# Patient Record
Sex: FEMALE | Race: WHITE | NOT HISPANIC OR LATINO | Employment: OTHER | URBAN - METROPOLITAN AREA
[De-identification: names, ages, dates, MRNs, and addresses within clinical notes are randomized per-mention and may not be internally consistent; named-entity substitution may affect disease eponyms.]

---

## 2023-06-30 DIAGNOSIS — E11.65 TYPE 2 DIABETES MELLITUS WITH HYPERGLYCEMIA, UNSPECIFIED WHETHER LONG TERM INSULIN USE (MULTI): Primary | ICD-10-CM

## 2023-07-03 NOTE — TELEPHONE ENCOUNTER
Pt called stating Cinda, does not have her refill on file and they need a new rx for pended med    Next ov  8/1

## 2023-07-05 RX ORDER — GLIMEPIRIDE 4 MG/1
TABLET ORAL
Qty: 180 TABLET | Refills: 0 | Status: SHIPPED | OUTPATIENT
Start: 2023-07-05 | End: 2023-08-01 | Stop reason: SDUPTHER

## 2023-07-29 LAB
ALBUMIN (MG/L) IN URINE: 359.5 MG/L
ALBUMIN/CREATININE (UG/MG) IN URINE: 830.3 UG/MG CRT (ref 0–30)
ANION GAP IN SER/PLAS: 12 MMOL/L (ref 10–20)
CALCIUM (MG/DL) IN SER/PLAS: 9.1 MG/DL (ref 8.6–10.3)
CARBON DIOXIDE, TOTAL (MMOL/L) IN SER/PLAS: 27 MMOL/L (ref 21–32)
CHLORIDE (MMOL/L) IN SER/PLAS: 101 MMOL/L (ref 98–107)
CHOLESTEROL (MG/DL) IN SER/PLAS: 109 MG/DL (ref 0–199)
CHOLESTEROL IN HDL (MG/DL) IN SER/PLAS: 36.7 MG/DL
CHOLESTEROL/HDL RATIO: 3
CREATININE (MG/DL) IN SER/PLAS: 0.6 MG/DL (ref 0.5–1.05)
CREATININE (MG/DL) IN URINE: 43.3 MG/DL (ref 20–320)
ESTIMATED AVERAGE GLUCOSE FOR HBA1C: 223 MG/DL
GFR FEMALE: >90 ML/MIN/1.73M2
GLUCOSE (MG/DL) IN SER/PLAS: 190 MG/DL (ref 74–99)
HEMOGLOBIN A1C/HEMOGLOBIN TOTAL IN BLOOD: 9.4 %
LDL: 50 MG/DL (ref 0–99)
POTASSIUM (MMOL/L) IN SER/PLAS: 4.3 MMOL/L (ref 3.5–5.3)
SODIUM (MMOL/L) IN SER/PLAS: 136 MMOL/L (ref 136–145)
TRIGLYCERIDE (MG/DL) IN SER/PLAS: 112 MG/DL (ref 0–149)
UREA NITROGEN (MG/DL) IN SER/PLAS: 13 MG/DL (ref 6–23)
VLDL: 22 MG/DL (ref 0–40)

## 2023-08-01 ENCOUNTER — OFFICE VISIT (OUTPATIENT)
Dept: PRIMARY CARE | Facility: CLINIC | Age: 78
End: 2023-08-01
Payer: MEDICARE

## 2023-08-01 VITALS
HEART RATE: 67 BPM | SYSTOLIC BLOOD PRESSURE: 122 MMHG | WEIGHT: 126 LBS | OXYGEN SATURATION: 93 % | BODY MASS INDEX: 27.18 KG/M2 | HEIGHT: 57 IN | DIASTOLIC BLOOD PRESSURE: 62 MMHG | TEMPERATURE: 97.7 F

## 2023-08-01 DIAGNOSIS — Z00.00 ROUTINE GENERAL MEDICAL EXAMINATION AT HEALTH CARE FACILITY: Primary | ICD-10-CM

## 2023-08-01 DIAGNOSIS — J42 CHRONIC BRONCHITIS, UNSPECIFIED CHRONIC BRONCHITIS TYPE (MULTI): ICD-10-CM

## 2023-08-01 DIAGNOSIS — I10 ESSENTIAL HYPERTENSION: ICD-10-CM

## 2023-08-01 DIAGNOSIS — R29.6 RECURRENT FALLS: ICD-10-CM

## 2023-08-01 DIAGNOSIS — E11.65 TYPE 2 DIABETES MELLITUS WITH HYPERGLYCEMIA, UNSPECIFIED WHETHER LONG TERM INSULIN USE (MULTI): ICD-10-CM

## 2023-08-01 DIAGNOSIS — B35.1 ONYCHOMYCOSIS: ICD-10-CM

## 2023-08-01 DIAGNOSIS — E11.21 TYPE 2 DIABETES MELLITUS WITH DIABETIC NEPHROPATHY, WITHOUT LONG-TERM CURRENT USE OF INSULIN (MULTI): ICD-10-CM

## 2023-08-01 DIAGNOSIS — F33.42 RECURRENT MAJOR DEPRESSIVE DISORDER, IN FULL REMISSION (CMS-HCC): ICD-10-CM

## 2023-08-01 DIAGNOSIS — Z13.89 ENCOUNTER FOR SCREENING FOR OTHER DISORDER: ICD-10-CM

## 2023-08-01 DIAGNOSIS — E78.2 MIXED HYPERLIPIDEMIA: ICD-10-CM

## 2023-08-01 DIAGNOSIS — M81.0 AGE-RELATED OSTEOPOROSIS WITHOUT CURRENT PATHOLOGICAL FRACTURE: ICD-10-CM

## 2023-08-01 DIAGNOSIS — E11.9 TYPE 2 DIABETES MELLITUS WITHOUT COMPLICATION, WITHOUT LONG-TERM CURRENT USE OF INSULIN (MULTI): ICD-10-CM

## 2023-08-01 PROBLEM — G25.81 RESTLESS LEGS: Status: ACTIVE | Noted: 2020-09-03

## 2023-08-01 PROBLEM — K21.9 GASTROESOPHAGEAL REFLUX DISEASE: Status: ACTIVE | Noted: 2017-10-16

## 2023-08-01 PROBLEM — F32.9 MAJOR DEPRESSIVE DISORDER: Status: ACTIVE | Noted: 2023-08-01

## 2023-08-01 PROBLEM — I25.10 CORONARY ARTERIOSCLEROSIS: Status: ACTIVE | Noted: 2018-07-12

## 2023-08-01 PROBLEM — I70.0 ATHEROSCLEROSIS OF AORTA (CMS-HCC): Status: ACTIVE | Noted: 2020-09-03

## 2023-08-01 PROCEDURE — 99406 BEHAV CHNG SMOKING 3-10 MIN: CPT | Performed by: FAMILY MEDICINE

## 2023-08-01 PROCEDURE — 3074F SYST BP LT 130 MM HG: CPT | Performed by: FAMILY MEDICINE

## 2023-08-01 PROCEDURE — G0439 PPPS, SUBSEQ VISIT: HCPCS | Performed by: FAMILY MEDICINE

## 2023-08-01 PROCEDURE — 1159F MED LIST DOCD IN RCRD: CPT | Performed by: FAMILY MEDICINE

## 2023-08-01 PROCEDURE — G0444 DEPRESSION SCREEN ANNUAL: HCPCS | Performed by: FAMILY MEDICINE

## 2023-08-01 PROCEDURE — 1170F FXNL STATUS ASSESSED: CPT | Performed by: FAMILY MEDICINE

## 2023-08-01 PROCEDURE — 3078F DIAST BP <80 MM HG: CPT | Performed by: FAMILY MEDICINE

## 2023-08-01 PROCEDURE — 99397 PER PM REEVAL EST PAT 65+ YR: CPT | Performed by: FAMILY MEDICINE

## 2023-08-01 PROCEDURE — 1160F RVW MEDS BY RX/DR IN RCRD: CPT | Performed by: FAMILY MEDICINE

## 2023-08-01 PROCEDURE — 99214 OFFICE O/P EST MOD 30 MIN: CPT | Performed by: FAMILY MEDICINE

## 2023-08-01 RX ORDER — CYCLOBENZAPRINE HCL 5 MG
5 TABLET ORAL NIGHTLY PRN
COMMUNITY
Start: 2022-07-05

## 2023-08-01 RX ORDER — SIMVASTATIN 40 MG/1
40 TABLET, FILM COATED ORAL NIGHTLY
COMMUNITY
Start: 2017-04-12 | End: 2023-08-01 | Stop reason: WASHOUT

## 2023-08-01 RX ORDER — SERTRALINE HYDROCHLORIDE 100 MG/1
100 TABLET, FILM COATED ORAL DAILY
Qty: 90 TABLET | Refills: 1 | Status: SHIPPED | OUTPATIENT
Start: 2023-08-01 | End: 2024-02-01 | Stop reason: SDUPTHER

## 2023-08-01 RX ORDER — CARVEDILOL 6.25 MG/1
6.25 TABLET ORAL
Qty: 60 TABLET | Refills: 11 | Status: SHIPPED | OUTPATIENT
Start: 2023-08-01 | End: 2024-07-31

## 2023-08-01 RX ORDER — METFORMIN HYDROCHLORIDE 1000 MG/1
1000 TABLET ORAL
Qty: 180 TABLET | Refills: 1 | Status: SHIPPED | OUTPATIENT
Start: 2023-08-01 | End: 2024-02-01 | Stop reason: SDUPTHER

## 2023-08-01 RX ORDER — ISOSORBIDE MONONITRATE 60 MG/1
60 TABLET, EXTENDED RELEASE ORAL DAILY
COMMUNITY
Start: 2017-01-16 | End: 2023-08-01 | Stop reason: SDUPTHER

## 2023-08-01 RX ORDER — ALBUTEROL SULFATE 90 UG/1
2 AEROSOL, METERED RESPIRATORY (INHALATION) EVERY 4 HOURS PRN
COMMUNITY
Start: 2017-04-25

## 2023-08-01 RX ORDER — BLOOD SUGAR DIAGNOSTIC
STRIP MISCELLANEOUS
COMMUNITY
Start: 2017-06-02

## 2023-08-01 RX ORDER — GLIMEPIRIDE 4 MG/1
4 TABLET ORAL 2 TIMES DAILY
Qty: 180 TABLET | Refills: 0 | Status: SHIPPED | OUTPATIENT
Start: 2023-08-01 | End: 2023-12-28

## 2023-08-01 RX ORDER — LIRAGLUTIDE 6 MG/ML
1.8 INJECTION SUBCUTANEOUS DAILY
Qty: 0.17 G | Refills: 1 | Status: SHIPPED | OUTPATIENT
Start: 2023-08-01 | End: 2023-08-04 | Stop reason: SDUPTHER

## 2023-08-01 RX ORDER — SERTRALINE HYDROCHLORIDE 100 MG/1
100 TABLET, FILM COATED ORAL DAILY
COMMUNITY
Start: 2015-11-02 | End: 2023-08-01 | Stop reason: SDUPTHER

## 2023-08-01 RX ORDER — ISOSORBIDE MONONITRATE 60 MG/1
60 TABLET, EXTENDED RELEASE ORAL DAILY
Qty: 90 TABLET | Refills: 1 | Status: SHIPPED | OUTPATIENT
Start: 2023-08-01 | End: 2024-02-01 | Stop reason: SDUPTHER

## 2023-08-01 RX ORDER — ATORVASTATIN CALCIUM 40 MG/1
40 TABLET, FILM COATED ORAL DAILY
COMMUNITY
Start: 2018-07-12 | End: 2023-08-01 | Stop reason: SDUPTHER

## 2023-08-01 RX ORDER — LIRAGLUTIDE 6 MG/ML
1.8 INJECTION SUBCUTANEOUS DAILY
COMMUNITY
Start: 2017-09-16 | End: 2023-08-01 | Stop reason: SDUPTHER

## 2023-08-01 RX ORDER — ASPIRIN 81 MG/1
81 TABLET ORAL DAILY
COMMUNITY
Start: 2008-10-06

## 2023-08-01 RX ORDER — AMLODIPINE BESYLATE 10 MG/1
10 TABLET ORAL DAILY
COMMUNITY
Start: 2017-05-31 | End: 2023-08-01 | Stop reason: SDUPTHER

## 2023-08-01 RX ORDER — ATORVASTATIN CALCIUM 40 MG/1
40 TABLET, FILM COATED ORAL DAILY
Qty: 30 TABLET | Refills: 5 | Status: SHIPPED | OUTPATIENT
Start: 2023-08-01 | End: 2024-02-01 | Stop reason: SDUPTHER

## 2023-08-01 RX ORDER — METFORMIN HYDROCHLORIDE 1000 MG/1
1000 TABLET ORAL
COMMUNITY
Start: 2017-06-12 | End: 2023-08-01 | Stop reason: SDUPTHER

## 2023-08-01 RX ORDER — CARVEDILOL 12.5 MG/1
12.5 TABLET ORAL
COMMUNITY
Start: 2016-10-17 | End: 2023-08-01 | Stop reason: SINTOL

## 2023-08-01 RX ORDER — PIOGLITAZONEHYDROCHLORIDE 15 MG/1
15 TABLET ORAL DAILY
Qty: 30 TABLET | Refills: 11 | Status: SHIPPED | OUTPATIENT
Start: 2023-08-01 | End: 2024-07-31

## 2023-08-01 RX ORDER — CICLOPIROX OLAMINE 7.7 MG/G
CREAM TOPICAL 2 TIMES DAILY
Qty: 180 G | Refills: 1 | Status: SHIPPED | OUTPATIENT
Start: 2023-08-01 | End: 2024-02-01 | Stop reason: SDUPTHER

## 2023-08-01 RX ORDER — AMLODIPINE BESYLATE 10 MG/1
10 TABLET ORAL DAILY
Qty: 90 TABLET | Refills: 1 | Status: SHIPPED | OUTPATIENT
Start: 2023-08-01 | End: 2024-02-01 | Stop reason: SDUPTHER

## 2023-08-01 ASSESSMENT — PATIENT HEALTH QUESTIONNAIRE - PHQ9
SUM OF ALL RESPONSES TO PHQ9 QUESTIONS 1 AND 2: 0
1. LITTLE INTEREST OR PLEASURE IN DOING THINGS: NOT AT ALL
2. FEELING DOWN, DEPRESSED OR HOPELESS: NOT AT ALL

## 2023-08-01 ASSESSMENT — ACTIVITIES OF DAILY LIVING (ADL)
MANAGING_FINANCES: INDEPENDENT
GROCERY_SHOPPING: NEEDS ASSISTANCE
BATHING: NEEDS ASSISTANCE
DOING_HOUSEWORK: INDEPENDENT
TAKING_MEDICATION: INDEPENDENT
DRESSING: INDEPENDENT

## 2023-08-01 ASSESSMENT — ENCOUNTER SYMPTOMS: DIZZINESS: 1

## 2023-08-01 NOTE — PROGRESS NOTES
Assessment     ASSESSMENT/PLAN:      Problem List Items Addressed This Visit          Cardiac and Vasculature    Essential hypertension    Relevant Medications    carvedilol (Coreg) 6.25 mg tablet    isosorbide mononitrate ER (Imdur) 60 mg 24 hr tablet    amLODIPine (Norvasc) 10 mg tablet    Mixed hyperlipidemia    Relevant Medications    atorvastatin (Lipitor) 40 mg tablet       Endocrine/Metabolic    Type 2 diabetes mellitus with diabetic nephropathy, without long-term current use of insulin (CMS/[facility])    Relevant Medications    liraglutide (Victoza 2-Jed) 0.6 mg/0.1 mL (18 mg/3 mL) injection    metFORMIN (Glucophage) 1,000 mg tablet    pioglitazone (Actos) 15 mg tablet    Osteoporosis       Mental Health    Major depressive disorder    Relevant Medications    sertraline (Zoloft) 100 mg tablet       Pulmonary and Pneumonias    Chronic obstructive lung disease (CMS/[facility])     Other Visit Diagnoses       Routine general medical examination at health care facility    -  Primary    Recurrent falls        Relevant Orders    Referral to Physical Therapy    Type 2 diabetes mellitus with hyperglycemia, unspecified whether long term insulin use (CMS/[facility])        Relevant Medications    glimepiride (Amaryl) 4 mg tablet    Onychomycosis        Relevant Medications    ciclopirox (Loprox) 0.77 % cream    Encounter for screening for other disorder                Patient Instructions:  Patient Instructions   Recurrent fall: We are decreasing carvedilol to 6.25 mg twice daily due to increased dizziness, we have sent a referral to physical therapy to help with strengthening  T2DM: Your A1c is uncontrolled at 9.4, we have added a medication called pioglitazone to try to help with your blood sugar.  Encouraged to try low-carb diet          Ways to Help Prevent Falls at Home    Quick Tips   ? Ask for help if you need it. Most people want to help!   ? Get up slowly after sitting or laying down   ? Wear a medical alert device or keep  cell phone in your pocket   ? Use night lights, especially areas near a bathroom   ? Keep the items you use often within reach on a small stool or end table   ? Use an assistive device such as walker or cane, as directed by provider/physical therapy   ? Use a non-slip mat and grab bars in your bathroom. Look for home health sections for best options     Other Areas to Focus On   ? Exercise and nutrition: Regular exercise or taking a falls prevention class are great ways improve strength and balance. Don’t forget to stay hydrated and bring a snack!   ? Medicine side effects: Some medicines can make you sleepy or dizzy, which could cause a fall. Ask your healthcare provider about the side effects your medicines could cause. Be sure to let them know if you take any vitamins or supplements as well.   ? Tripping hazards: Remove items you could trip on, such as loose mats, rugs, cords, and clutter. Wear closed toe shoes with rubber soles.   ? Health and wellness: Get regular checkups with your healthcare provider, plus routine vision and hearing screenings. Talk with your healthcare provider about:   o Your medicines and the possible side effects - bring them in a bag if that is easier!   o Problems with balance or feeling dizzy   o Ways to promote bone health, such as Vitamin D and calcium supplements   o Questions or concerns about falling     *Ask your healthcare team if you have questions     ©[facility], 2022       Signed by: Ar Jimenez DO       FUTURE DIRECTION:   Review A1c    Subjective   SUBJECTIVE:     HPI : Patient is a 78 y.o. female who presents today for the following:     T2DM  Glimepiride 4 mg bid, Metformin 1000 mg, Victoza 1.8mcg   Admits to poor diet, low activity       HTN/CAD  -Carvedilol 12.5 mg, Imdur 60 mg, amlodipine 10 mg, baby aspirin  - has been feeling dizzy     HLD  Atorvastatin 40 mg    Depression/anxiety  Zoloft 100 mg    COPD  Ventolin    Dysphagia  Doing better, would  like to cancel barium sallow study     Nicotine use  Tobacco use, not ready to quit     Osteoporosis  -Takes vitamin D and calcium supplement    Social: goes to [place]       Review of Systems   Neurological:  Positive for dizziness.       History reviewed. No pertinent past medical history.     History reviewed. No pertinent surgical history.     Current Outpatient Medications   Medication Instructions    Accu-Chek Meghan Plus test strp strip Accu-Chek Meghan Plus In Vitro Strip   Quantity: 200  Refills: 0        Start : 2-Jun-2017   Active    amLODIPine (NORVASC) 10 mg, oral, Daily    aspirin 81 mg, oral, Daily    atorvastatin (LIPITOR) 40 mg, oral, Daily    carvedilol (COREG) 6.25 mg, oral, 2 times daily with meals    ciclopirox (Loprox) 0.77 % cream Topical, 2 times daily    cyclobenzaprine (FLEXERIL) 5 mg, oral, Nightly PRN    glimepiride (AMARYL) 4 mg, oral, 2 times daily    isosorbide mononitrate ER (IMDUR) 60 mg, oral, Daily    metFORMIN (GLUCOPHAGE) 1,000 mg, oral, 2 times daily with meals    pioglitazone (ACTOS) 15 mg, oral, Daily    sertraline (ZOLOFT) 100 mg, oral, Daily    Ventolin HFA 90 mcg/actuation inhaler 2 puffs, inhalation, Every 4 hours PRN    Victoza 2-Jed 1.8 mg, subcutaneous, Daily        Allergies   Allergen Reactions    Omeprazole Hives     hives    Ace Inhibitors Hives    Bupropion Hcl Hives     hives    Codeine Other    Penicillins Other    Sulfamethoxazole-Trimethoprim Hives        Social History     Socioeconomic History    Marital status:      Spouse name: Not on file    Number of children: Not on file    Years of education: Not on file    Highest education level: Not on file   Occupational History    Not on file   Tobacco Use    Smoking status: Every Day     Packs/day: 0.50     Types: Cigarettes     Start date: 1964    Smokeless tobacco: Never   Substance and Sexual Activity    Alcohol use: Not on file    Drug use: Not on file    Sexual activity: Not on file   Other Topics  "Concern    Not on file   Social History Narrative    Not on file     Social Determinants of Health     Financial Resource Strain: Not on file   Food Insecurity: Not on file   Transportation Needs: Not on file   Physical Activity: Not on file   Stress: Not on file   Social Connections: Not on file   Intimate Partner Violence: Not on file   Housing Stability: Not on file        No family history on file.     Objective     OBJECTIVE:     Vitals:    08/01/23 1158   BP: 122/62   Pulse: 67   Temp: 36.5 °C (97.7 °F)   TempSrc: Skin   SpO2: 93%   Weight: 57.2 kg (126 lb)   Height: 1.441 m (4' 8.75\")        Physical Exam  HENT:      Head: Normocephalic and atraumatic.      Nose: Nose normal.      Mouth/Throat:      Mouth: Mucous membranes are moist.   Eyes:      Pupils: Pupils are equal, round, and reactive to light.   Cardiovascular:      Rate and Rhythm: Normal rate and regular rhythm.      Pulses: Normal pulses.      Heart sounds: No murmur heard.  Pulmonary:      Effort: Pulmonary effort is normal.      Breath sounds: Normal breath sounds.   Abdominal:      Tenderness: There is no abdominal tenderness.   Musculoskeletal:         General: Normal range of motion.      Cervical back: Normal range of motion.   Feet:      Right foot:      Protective Sensation: 10 sites tested.  10 sites sensed.      Left foot:      Protective Sensation: 10 sites tested.  10 sites sensed.   Skin:     General: Skin is warm and dry.   Neurological:      Mental Status: She is alert.   Psychiatric:         Mood and Affect: Mood normal.             "

## 2023-08-01 NOTE — PATIENT INSTRUCTIONS
Recurrent fall: We are decreasing carvedilol to 6.25 mg twice daily due to increased dizziness, we have sent a referral to physical therapy to help with strengthening  T2DM: Your A1c is uncontrolled at 9.4, we have added a medication called pioglitazone to try to help with your blood sugar.  Encouraged to try low-carb diet  Tobacco use: Patient advised to quit however she is not ready          Ways to Help Prevent Falls at Home    Quick Tips   ? Ask for help if you need it. Most people want to help!   ? Get up slowly after sitting or laying down   ? Wear a medical alert device or keep cell phone in your pocket   ? Use night lights, especially areas near a bathroom   ? Keep the items you use often within reach on a small stool or end table   ? Use an assistive device such as walker or cane, as directed by provider/physical therapy   ? Use a non-slip mat and grab bars in your bathroom. Look for home health sections for best options     Other Areas to Focus On   ? Exercise and nutrition: Regular exercise or taking a falls prevention class are great ways improve strength and balance. Don’t forget to stay hydrated and bring a snack!   ? Medicine side effects: Some medicines can make you sleepy or dizzy, which could cause a fall. Ask your healthcare provider about the side effects your medicines could cause. Be sure to let them know if you take any vitamins or supplements as well.   ? Tripping hazards: Remove items you could trip on, such as loose mats, rugs, cords, and clutter. Wear closed toe shoes with rubber soles.   ? Health and wellness: Get regular checkups with your healthcare provider, plus routine vision and hearing screenings. Talk with your healthcare provider about:   o Your medicines and the possible side effects - bring them in a bag if that is easier!   o Problems with balance or feeling dizzy   o Ways to promote bone health, such as Vitamin D and calcium supplements   o Questions or concerns about falling      *Ask your healthcare team if you have questions     ©[facility], 2022

## 2023-08-04 DIAGNOSIS — E11.9 TYPE 2 DIABETES MELLITUS WITHOUT COMPLICATION, WITHOUT LONG-TERM CURRENT USE OF INSULIN (MULTI): ICD-10-CM

## 2023-08-04 RX ORDER — LIRAGLUTIDE 6 MG/ML
1.8 INJECTION SUBCUTANEOUS DAILY
Qty: 0.17 G | Refills: 1 | Status: SHIPPED | OUTPATIENT
Start: 2023-08-04 | End: 2024-02-01 | Stop reason: SDUPTHER

## 2023-08-04 NOTE — TELEPHONE ENCOUNTER
Received a fax stating the QTY is missing or unclear for the Victoza. Asking for QTY to be fixed and resent to [facility]. Thanks, CG

## 2023-10-06 PROBLEM — R29.6 RECURRENT FALLS: Status: ACTIVE | Noted: 2023-10-06

## 2023-10-06 PROBLEM — M43.17 ACQUIRED SPONDYLOLISTHESIS OF LUMBOSACRAL REGION: Status: ACTIVE | Noted: 2023-10-06

## 2023-10-06 PROBLEM — Z87.2: Status: ACTIVE | Noted: 2023-10-06

## 2023-10-06 RX ORDER — SIMVASTATIN 40 MG/1
40 TABLET, FILM COATED ORAL
COMMUNITY

## 2023-10-06 RX ORDER — OMEPRAZOLE 40 MG/1
40 CAPSULE, DELAYED RELEASE ORAL
COMMUNITY

## 2023-10-06 RX ORDER — PEN NEEDLE, DIABETIC 30 GX 1/3"
NEEDLE, DISPOSABLE MISCELLANEOUS
COMMUNITY

## 2023-10-12 ENCOUNTER — TREATMENT (OUTPATIENT)
Dept: PHYSICAL THERAPY | Facility: HOSPITAL | Age: 78
End: 2023-10-12
Payer: MEDICARE

## 2023-10-12 DIAGNOSIS — M43.17 ACQUIRED SPONDYLOLISTHESIS OF LUMBOSACRAL REGION: ICD-10-CM

## 2023-10-12 DIAGNOSIS — R29.6 RECURRENT FALLS: Primary | ICD-10-CM

## 2023-10-12 DIAGNOSIS — M54.16 LUMBAR RADICULOPATHY: ICD-10-CM

## 2023-10-12 PROCEDURE — 97110 THERAPEUTIC EXERCISES: CPT | Mod: GP

## 2023-10-12 ASSESSMENT — ENCOUNTER SYMPTOMS
DEPRESSION: 1
LOSS OF SENSATION IN FEET: 0
OCCASIONAL FEELINGS OF UNSTEADINESS: 1

## 2023-10-12 ASSESSMENT — BALANCE ASSESSMENTS
STANDING UNSUPPORTED WITH EYES CLOSED: ABLE TO STAND 10 SECONDS SAFELY
STANDING ON ONE LEG: TRIES TO LIFT LEG UNABLE TO HOLD 3 SECONDS BUT REMAINS STANDING INDEPENDENTLY
TURN 360 DEGREES: ABLE TO TURN 360 DEGREES SAFELY BUT SLOWLY
REACHING FORWARD WITH OUTSTRETCHED ARM WHILE STANDING: CAN REACH FORWARD 5 CM (2 INCHES)
STANDING UNSUPPORTED WITH FEET TOGETHER: ABLE TO PLACE FEET TOGETHER INDEPENDENTLY AND STAND 1 MINUTE WITH SUPERVISION
STANDING UNSUPPORTED ONE FOOT IN FRONT: ABLE TO PLACE FOOT AHEAD INDEPENDENTLY AND HOLD 30 SECONDS
STANDING UNSUPPORTED: ABLE TO STAND SAFELY FOR 2 MINUTES
STANDING TO SITTING: ABLE TO STAND INDEPENDENTLY USING HANDS
PICK UP OBJECT FROM THE FLOOR FROM A STANDING POSITION: ABLE TO PICK UP SLIPPER BUT NEEDS SUPERVISION
PLACE ALTERNATE FOOT ON STEP OR STOOL WHILE STANDING UNSUPPORTED: TURNS SIDEWAYS ONLY BUT MAINTAINS BALANCE
SITTING WITH BACK UNSUPPORTED BUT FEET SUPPORTED ON FLOOR OR ON A STOOL: ABLE TO SIT SAFELY AND SECURELY FOR 2 MINUTES
LONG VERSION TOTAL SCORE (MAX 56): 39
TRANSFERS: ABLE TO TRANSFER SAFELY DEFINITE NEED OF HANDS
PLACE ALTERNATE FOOT ON STEP OR STOOL WHILE STANDING UNSUPPORTED: ABLE TO COMPLETE 4 STEPS WITHOUT AID WITH SUPERVISION
STANDING TO SITTING: CONTROLS DESCENT BY USING HANDS

## 2023-10-12 ASSESSMENT — PAIN - FUNCTIONAL ASSESSMENT: PAIN_FUNCTIONAL_ASSESSMENT: 0-10

## 2023-10-12 ASSESSMENT — PAIN SCALES - GENERAL: PAINLEVEL_OUTOF10: 5 - MODERATE PAIN

## 2023-10-12 NOTE — PROGRESS NOTES
Physical Therapy    Physical Therapy Treatment    Patient Name: Funmilayo Montenegro  MRN: [id number]  Today's Date: 10/12/2023     Visit # 5/10  Insurance: [facility] 23 - 10/30/23    Assessment:     Pt has responded to treatment with Improvement in Oswestry Disability score from 76 to 56% and improvement in Ponce balance score from 39 to 36 despite decreased visit frequency.    Plan:     Discharge to [facility], Pt will be in/out of town due to family illness, insurance auth will  in 2 weeks.     Current Problem  1. Recurrent falls        2. Acquired spondylolisthesis of lumbosacral region        3. Lumbar radiculopathy            Subjective   General     Pt reports no new complaints, thinks that therapy has helped some but pain waxes and wanes.  Pt's brother recently diagnosed with Stage 4 CA, will being going out of state to spend time with him.     Precautions  Precautions  STEADI Fall Risk Score (The score of 4 or more indicates an increased risk of falling): 10       Pain  Pain Assessment: 0-10  Pain Score: 5 - Moderate pain    Objective      Pain in LB through out right LE with MMT'ing   Right Left   Hip:     Flexion 3/5 4-/5   Abduction 4-/5 4+/5   Adduction 4+/5 4+/5        Knee:     Flexion 4-/5 4-/5   Extension          Ankle:     PF     DF        Outcome Measures:  Ponce Balance Scale  1. Sitting to Standing: Able to stand independently using hands  2. Standing Unsupported: Able to stand safely for 2 minutes  3. Sitting with Back Unsupported but Feet Supported on Floor or on a Stool: Able to sit safely and securely for 2 minutes  4. Standing to Sitting: Controls descent by using hands  5.  Transfers: Able to transfer safely definite need of hands  6. Standing Unsupported with Eyes Closed: Able to stand 10 seconds safely  7. Standing Unsupported with Feet Together: Able to place feet together independently and stand 1 minute with supervision  8. Reach Forward with Outstretched Arm While Standing: Can reach forward  5 cm (2 inches)  9.  Object from Floor from a Standing Position: Able to  slipper but needs supervision  10. Turning to Look Behind Over Left and Right Shoulders While Standing: Turns sideways only but maintains balance  11. Turn 360 Degrees: Able to turn 360 degrees safely but slowly  12. Place Alternate Foot on Step or Stool While Standing Unsupported: Able to complete 4 steps without aid with supervision  13. Standing Unsupported One Foot in Front: Able to place foot ahead independently and hold 30 seconds  14. Standing on One Leg: Tries to lift leg unable to hold 3 seconds but remains standing independently  Ponce Balance Score: 39    Other Measures  Oswestry Disablity Index (JAMAL): 56%    Treatments:    EXERCISES Date 10/12/2023   Date  Date Date   VISIT# #5/10 # # #    REPS REPS REPS REPS          Nu Step L1 x 10'                                                                                                                                                                                                          HEP  X         OP EDUCATION:     Access Code: [email or web address]  URL: [email or web address]  Date: 10/12/2023  Prepared by: Anjali Corral    Exercises  - Tandem Stance with Support  - 1 x daily - 7 x weekly - 1 sets - 5 reps  - Side Stepping with Counter Support  - 1 x daily - 7 x weekly - 2 sets - 5 reps  Goals:        Activity Limitation: Pt will tolerate 30 minutes of aquatic exercise in preparation to transition to land based treatment, by week 3 - Discontinued  Pain: Pt will report having a decrease in LBP to 6/10 at it's worst for increased closed chain activity tolerance, by week 3 - Partially MET  Strength: Pt will have and increase in lumbopelvic strength by 1/2 MMT grade to improve core stabilization and pain, by week 6 - Partially MET  Pt will demonstrate independence and compliance with HEP to reinforce gains made in treatment. -Partially MET  , by week 2   .

## 2023-11-18 ENCOUNTER — HOSPITAL ENCOUNTER (OUTPATIENT)
Dept: RADIOLOGY | Facility: EXTERNAL LOCATION | Age: 78
Discharge: HOME | End: 2023-11-18

## 2023-11-18 DIAGNOSIS — R05.9 COUGH, UNSPECIFIED TYPE: ICD-10-CM

## 2023-11-24 DIAGNOSIS — E11.65 TYPE 2 DIABETES MELLITUS WITH HYPERGLYCEMIA, UNSPECIFIED WHETHER LONG TERM INSULIN USE (MULTI): ICD-10-CM

## 2023-12-19 RX ORDER — GLIMEPIRIDE 4 MG/1
4 TABLET ORAL 2 TIMES DAILY
Qty: 180 TABLET | Refills: 3 | OUTPATIENT
Start: 2023-12-19

## 2023-12-22 ENCOUNTER — OFFICE VISIT (OUTPATIENT)
Dept: PRIMARY CARE | Facility: CLINIC | Age: 78
End: 2023-12-22
Payer: MEDICARE

## 2023-12-22 VITALS
OXYGEN SATURATION: 94 % | SYSTOLIC BLOOD PRESSURE: 126 MMHG | TEMPERATURE: 97.1 F | HEART RATE: 96 BPM | DIASTOLIC BLOOD PRESSURE: 74 MMHG

## 2023-12-22 DIAGNOSIS — H10.33 ACUTE BACTERIAL CONJUNCTIVITIS OF BOTH EYES: Primary | ICD-10-CM

## 2023-12-22 DIAGNOSIS — R05.1 ACUTE COUGH: ICD-10-CM

## 2023-12-22 PROCEDURE — 99213 OFFICE O/P EST LOW 20 MIN: CPT | Performed by: FAMILY MEDICINE

## 2023-12-22 PROCEDURE — 99406 BEHAV CHNG SMOKING 3-10 MIN: CPT | Performed by: FAMILY MEDICINE

## 2023-12-22 PROCEDURE — 3074F SYST BP LT 130 MM HG: CPT | Performed by: FAMILY MEDICINE

## 2023-12-22 PROCEDURE — 3078F DIAST BP <80 MM HG: CPT | Performed by: FAMILY MEDICINE

## 2023-12-22 PROCEDURE — 1160F RVW MEDS BY RX/DR IN RCRD: CPT | Performed by: FAMILY MEDICINE

## 2023-12-22 PROCEDURE — 1159F MED LIST DOCD IN RCRD: CPT | Performed by: FAMILY MEDICINE

## 2023-12-22 PROCEDURE — 1125F AMNT PAIN NOTED PAIN PRSNT: CPT | Performed by: FAMILY MEDICINE

## 2023-12-22 RX ORDER — BACITRACIN ZINC AND POLYMYXIN B SULFATE 500; 10000 [USP'U]/G; [USP'U]/G
OINTMENT OPHTHALMIC EVERY 12 HOURS
Qty: 3.5 G | Refills: 0 | Status: SHIPPED | OUTPATIENT
Start: 2023-12-22 | End: 2024-01-01

## 2023-12-22 RX ORDER — AZITHROMYCIN 250 MG/1
TABLET, FILM COATED ORAL
Qty: 6 TABLET | Refills: 0 | Status: SHIPPED | OUTPATIENT
Start: 2023-12-22 | End: 2023-12-27

## 2023-12-22 NOTE — PROGRESS NOTES
Assessment/Plan   ASSESSMENT/PLAN:      Funmilayo was seen today for cough, nasal congestion, eye redness and poor appetite.  Diagnoses and all orders for this visit:  Acute bacterial conjunctivitis of both eyes (Primary)  -     bacitracin-polymyxin B (Polysporin) ophthalmic ointment; Apply to both eyes every 12 hours for 10 days.  Acute cough  -     azithromycin (Zithromax) 250 mg tablet; Take 2 tablets (500 mg) by mouth once daily for 1 day, THEN 1 tablet (250 mg) once daily for 4 days. Take 2 tabs (500 mg) by mouth today, than 1 daily for 4 days..       There are no Patient Instructions on file for this visit.    Ar Jimenez DO     FUTURE DIRECTION:     Subjective   SUBJECTIVE:     Patient ID: Funmilayo Montenegro is a 78 y.o. femalefor the following:    Cough   Ongoing for the past 2 months  Was seen in urgent care was prescribed medication however patient does not remember what it was  Cough is productive, described as purulent  Initially had fevers and chills but that has resolved  Denies chest pain or difficulty with breathing    Eye redness  -Ongoing for the past week  -States that in bilateral eyes there is drainage, redness, pain  -Denies changes in vision    Review of Systems    Allergies   Allergen Reactions    Omeprazole Hives     hives    Ace Inhibitors Hives    Bupropion Hcl Hives     hives    Codeine Other    Penicillins Other    Sulfamethoxazole-Trimethoprim Hives         Current Outpatient Medications:     Accu-Chek Meghan Plus test strp strip, Accu-Chek Meghan Plus In Vitro Strip  Quantity: 200  Refills: 0      Start : 2-Jun-2017  Active, Disp: , Rfl:     amLODIPine (Norvasc) 10 mg tablet, Take 1 tablet (10 mg) by mouth once daily., Disp: 90 tablet, Rfl: 1    aspirin 81 mg EC tablet, Take 1 tablet (81 mg) by mouth once daily., Disp: , Rfl:     atorvastatin (Lipitor) 40 mg tablet, Take 1 tablet (40 mg) by mouth once daily., Disp: 30 tablet, Rfl: 5    carvedilol (Coreg) 6.25 mg tablet, Take 1  "tablet (6.25 mg) by mouth 2 times a day with meals., Disp: 60 tablet, Rfl: 11    ciclopirox (Loprox) 0.77 % cream, Apply topically 2 times a day., Disp: 180 g, Rfl: 1    cyclobenzaprine (Flexeril) 5 mg tablet, Take 1 tablet (5 mg) by mouth as needed at bedtime., Disp: , Rfl:     IPRATROPIUM BROMIDE NASL, Administer into affected nostril(s)., Disp: , Rfl:     isosorbide mononitrate ER (Imdur) 60 mg 24 hr tablet, Take 1 tablet (60 mg) by mouth once daily., Disp: 90 tablet, Rfl: 1    liraglutide (Victoza 2-Jed) 0.6 mg/0.1 mL (18 mg/3 mL) injection, Inject 1.8 mg under the skin once daily., Disp: 0.17 g, Rfl: 1    metFORMIN (Glucophage) 1,000 mg tablet, Take 1 tablet (1,000 mg) by mouth 2 times a day with meals., Disp: 180 tablet, Rfl: 1    omeprazole (PriLOSEC) 40 mg DR capsule, Take 1 capsule (40 mg) by mouth. Do not crush or chew., Disp: , Rfl:     pen needle, diabetic (NovoTwist) 32 gauge x 1/5\" needle, , Disp: , Rfl:     pioglitazone (Actos) 15 mg tablet, Take 1 tablet (15 mg) by mouth once daily., Disp: 30 tablet, Rfl: 11    sertraline (Zoloft) 100 mg tablet, Take 1 tablet (100 mg) by mouth once daily., Disp: 90 tablet, Rfl: 1    simvastatin (Zocor) 40 mg tablet, Take 1 tablet (40 mg) by mouth., Disp: , Rfl:     SITagliptin phosphate (Januvia) 100 mg tablet, Take 1 tablet (100 mg) by mouth once daily., Disp: , Rfl:     Ventolin HFA 90 mcg/actuation inhaler, Inhale 2 puffs every 4 hours if needed., Disp: , Rfl:     azithromycin (Zithromax) 250 mg tablet, Take 2 tablets (500 mg) by mouth once daily for 1 day, THEN 1 tablet (250 mg) once daily for 4 days. Take 2 tabs (500 mg) by mouth today, than 1 daily for 4 days.., Disp: 6 tablet, Rfl: 0    bacitracin-polymyxin B (Polysporin) ophthalmic ointment, Apply to both eyes every 12 hours for 10 days., Disp: 3.5 g, Rfl: 0    glimepiride (Amaryl) 4 mg tablet, Take 1 tablet (4 mg) by mouth 2 times a day., Disp: 180 tablet, Rfl: 0     Patient Active Problem List   Diagnosis "    Atherosclerosis of aorta (CMS/HCC)    Chronic obstructive lung disease (CMS/HCC)    Coronary arteriosclerosis    Essential hypertension    Gastroesophageal reflux disease    Major depressive disorder    Mixed hyperlipidemia    Type 2 diabetes mellitus with diabetic nephropathy, without long-term current use of insulin (CMS/HCC)    Restless legs    Osteoporosis    Acquired spondylolisthesis of lumbosacral region    H/O idiopathic urticaria    Recurrent falls    Lumbar radiculopathy       Social History     Socioeconomic History    Marital status:      Spouse name: Not on file    Number of children: Not on file    Years of education: Not on file    Highest education level: Not on file   Occupational History    Not on file   Tobacco Use    Smoking status: Every Day     Packs/day: .5     Types: Cigarettes     Start date: 1964    Smokeless tobacco: Never   Substance and Sexual Activity    Alcohol use: Not on file    Drug use: Not on file    Sexual activity: Not on file   Other Topics Concern    Not on file   Social History Narrative    Not on file     Social Determinants of Health     Financial Resource Strain: Not on file   Food Insecurity: Not on file   Transportation Needs: Not on file   Physical Activity: Not on file   Stress: Not on file   Social Connections: Not on file   Intimate Partner Violence: Not on file   Housing Stability: Not on file       Objective   OBJECTIVE:     Vitals:    12/22/23 0953   BP: 126/74   Pulse: 96   Temp: 36.2 °C (97.1 °F)   SpO2: 94%       Exam      Physical Exam  Constitutional:       Appearance: Normal appearance.   HENT:      Head: Normocephalic.   Eyes:      Conjunctiva/sclera:      Right eye: Right conjunctiva is injected.      Left eye: Left conjunctiva is injected.      Comments: Erythema noted within bilateral conjunctiva with redness and crusting around bilateral lateral upper and lower lids   Pulmonary:      Effort: Pulmonary effort is normal.      Breath sounds:  Normal breath sounds. No decreased air movement.      Comments: Coughing on exam  Musculoskeletal:      Cervical back: Normal range of motion.   Neurological:      Mental Status: She is alert.   Psychiatric:         Mood and Affect: Mood normal.

## 2023-12-28 DIAGNOSIS — E11.65 TYPE 2 DIABETES MELLITUS WITH HYPERGLYCEMIA, UNSPECIFIED WHETHER LONG TERM INSULIN USE (MULTI): ICD-10-CM

## 2023-12-28 RX ORDER — GLIMEPIRIDE 4 MG/1
4 TABLET ORAL 2 TIMES DAILY
Qty: 180 TABLET | Refills: 0 | Status: SHIPPED | OUTPATIENT
Start: 2023-12-28 | End: 2024-03-14

## 2024-01-08 DIAGNOSIS — E78.2 MIXED HYPERLIPIDEMIA: ICD-10-CM

## 2024-01-09 RX ORDER — ATORVASTATIN CALCIUM 40 MG/1
40 TABLET, FILM COATED ORAL DAILY
Qty: 90 TABLET | Refills: 3 | OUTPATIENT
Start: 2024-01-09

## 2024-02-01 ENCOUNTER — OFFICE VISIT (OUTPATIENT)
Dept: PRIMARY CARE | Facility: CLINIC | Age: 79
End: 2024-02-01
Payer: MEDICARE

## 2024-02-01 VITALS
DIASTOLIC BLOOD PRESSURE: 62 MMHG | TEMPERATURE: 97 F | WEIGHT: 124 LBS | BODY MASS INDEX: 27.07 KG/M2 | SYSTOLIC BLOOD PRESSURE: 106 MMHG | OXYGEN SATURATION: 94 % | HEART RATE: 74 BPM

## 2024-02-01 DIAGNOSIS — F17.219 CIGARETTE NICOTINE DEPENDENCE WITH NICOTINE-INDUCED DISORDER: ICD-10-CM

## 2024-02-01 DIAGNOSIS — E11.9 TYPE 2 DIABETES MELLITUS WITHOUT COMPLICATION, WITHOUT LONG-TERM CURRENT USE OF INSULIN (MULTI): ICD-10-CM

## 2024-02-01 DIAGNOSIS — F33.42 RECURRENT MAJOR DEPRESSIVE DISORDER, IN FULL REMISSION (CMS-HCC): ICD-10-CM

## 2024-02-01 DIAGNOSIS — E78.2 MIXED HYPERLIPIDEMIA: ICD-10-CM

## 2024-02-01 DIAGNOSIS — R29.6 RECURRENT FALLS: ICD-10-CM

## 2024-02-01 DIAGNOSIS — I10 ESSENTIAL HYPERTENSION: Primary | ICD-10-CM

## 2024-02-01 PROCEDURE — 1160F RVW MEDS BY RX/DR IN RCRD: CPT | Performed by: FAMILY MEDICINE

## 2024-02-01 PROCEDURE — 1125F AMNT PAIN NOTED PAIN PRSNT: CPT | Performed by: FAMILY MEDICINE

## 2024-02-01 PROCEDURE — 3078F DIAST BP <80 MM HG: CPT | Performed by: FAMILY MEDICINE

## 2024-02-01 PROCEDURE — 99214 OFFICE O/P EST MOD 30 MIN: CPT | Performed by: FAMILY MEDICINE

## 2024-02-01 PROCEDURE — 3074F SYST BP LT 130 MM HG: CPT | Performed by: FAMILY MEDICINE

## 2024-02-01 PROCEDURE — 1159F MED LIST DOCD IN RCRD: CPT | Performed by: FAMILY MEDICINE

## 2024-02-01 RX ORDER — AMLODIPINE BESYLATE 10 MG/1
10 TABLET ORAL DAILY
Qty: 90 TABLET | Refills: 1 | Status: SHIPPED | OUTPATIENT
Start: 2024-02-01 | End: 2024-07-30

## 2024-02-01 RX ORDER — CICLOPIROX OLAMINE 7.7 MG/G
CREAM TOPICAL 2 TIMES DAILY
Qty: 180 G | Refills: 1 | Status: SHIPPED | OUTPATIENT
Start: 2024-02-01 | End: 2024-02-01 | Stop reason: ENTERED-IN-ERROR

## 2024-02-01 RX ORDER — METFORMIN HYDROCHLORIDE 1000 MG/1
1000 TABLET ORAL
Qty: 180 TABLET | Refills: 1 | Status: SHIPPED | OUTPATIENT
Start: 2024-02-01 | End: 2024-07-30

## 2024-02-01 RX ORDER — ATORVASTATIN CALCIUM 40 MG/1
40 TABLET, FILM COATED ORAL DAILY
Qty: 90 TABLET | Refills: 1 | Status: SHIPPED | OUTPATIENT
Start: 2024-02-01 | End: 2024-07-30

## 2024-02-01 RX ORDER — SERTRALINE HYDROCHLORIDE 100 MG/1
100 TABLET, FILM COATED ORAL DAILY
Qty: 90 TABLET | Refills: 1 | Status: SHIPPED | OUTPATIENT
Start: 2024-02-01 | End: 2024-07-30

## 2024-02-01 RX ORDER — LIRAGLUTIDE 6 MG/ML
1.8 INJECTION SUBCUTANEOUS DAILY
Qty: 27 ML | Refills: 1 | Status: SHIPPED | OUTPATIENT
Start: 2024-02-01 | End: 2024-05-24 | Stop reason: SDUPTHER

## 2024-02-01 RX ORDER — ISOSORBIDE MONONITRATE 60 MG/1
60 TABLET, EXTENDED RELEASE ORAL DAILY
Qty: 90 TABLET | Refills: 1 | Status: SHIPPED | OUTPATIENT
Start: 2024-02-01 | End: 2024-07-30

## 2024-02-01 ASSESSMENT — ENCOUNTER SYMPTOMS
OCCASIONAL FEELINGS OF UNSTEADINESS: 1
DEPRESSION: 0
LOSS OF SENSATION IN FEET: 0

## 2024-02-01 NOTE — PATIENT INSTRUCTIONS
1. Essential hypertension  Controlled  - amLODIPine (Norvasc) 10 mg tablet; Take 1 tablet (10 mg) by mouth once daily.  Dispense: 90 tablet; Refill: 1  - isosorbide mononitrate ER (Imdur) 60 mg 24 hr tablet; Take 1 tablet (60 mg) by mouth once daily.  Dispense: 90 tablet; Refill: 1    2. Mixed hyperlipidemia  Needs repeat labs  - atorvastatin (Lipitor) 40 mg tablet; Take 1 tablet (40 mg) by mouth once daily.  Dispense: 90 tablet; Refill: 1  - Lipid Panel; Future  - Lipid Panel; Future    3. Type 2 diabetes mellitus without complication, without long-term current use of insulin (CMS/HCC)  Uncontrolled due to poor diet, plan to make adjustment based on repeat labs  - liraglutide (Victoza 2-Jed) 0.6 mg/0.1 mL (18 mg/3 mL) injection; Inject 0.3 mL (1.8 mg) under the skin once daily.  Dispense: 27 mL; Refill: 1  - metFORMIN (Glucophage) 1,000 mg tablet; Take 1 tablet (1,000 mg) by mouth 2 times a day with meals.  Dispense: 180 tablet; Refill: 1  - Basic Metabolic Panel; Future  - Hemoglobin A1C; Future  - Hemoglobin A1C; Future  - Basic Metabolic Panel; Future  - Albumin, urine, random; Future    4. Cigarette nicotine dependence with nicotine-induced disorder  Tobacco cessation: spent 3-10 min discussing tobacco cessation, pt is not ready to quit     5. Recurrent major depressive disorder, in full remission (CMS/HCC)  Controlled  - sertraline (Zoloft) 100 mg tablet; Take 1 tablet (100 mg) by mouth once daily.  Dispense: 90 tablet; Refill: 1    6. Recurrent falls  Refer to fall clinic to help with recurrent falls.  - Referral to Falls Clinic; Future

## 2024-02-01 NOTE — PROGRESS NOTES
Assessment     ASSESSMENT/PLAN:      Patient Instructions   1. Essential hypertension  Controlled  - amLODIPine (Norvasc) 10 mg tablet; Take 1 tablet (10 mg) by mouth once daily.  Dispense: 90 tablet; Refill: 1  - isosorbide mononitrate ER (Imdur) 60 mg 24 hr tablet; Take 1 tablet (60 mg) by mouth once daily.  Dispense: 90 tablet; Refill: 1    2. Mixed hyperlipidemia  Needs repeat labs  - atorvastatin (Lipitor) 40 mg tablet; Take 1 tablet (40 mg) by mouth once daily.  Dispense: 90 tablet; Refill: 1  - Lipid Panel; Future  - Lipid Panel; Future    3. Type 2 diabetes mellitus without complication, without long-term current use of insulin (CMS/HCC)  Uncontrolled due to poor diet, plan to make adjustment based on repeat labs  - liraglutide (Victoza 2-Jed) 0.6 mg/0.1 mL (18 mg/3 mL) injection; Inject 0.3 mL (1.8 mg) under the skin once daily.  Dispense: 27 mL; Refill: 1  - metFORMIN (Glucophage) 1,000 mg tablet; Take 1 tablet (1,000 mg) by mouth 2 times a day with meals.  Dispense: 180 tablet; Refill: 1  - Basic Metabolic Panel; Future  - Hemoglobin A1C; Future  - Hemoglobin A1C; Future  - Basic Metabolic Panel; Future  - Albumin, urine, random; Future    4. Cigarette nicotine dependence with nicotine-induced disorder  Tobacco cessation: spent 3-10 min discussing tobacco cessation, pt is not ready to quit     5. Recurrent major depressive disorder, in full remission (CMS/HCC)  Controlled  - sertraline (Zoloft) 100 mg tablet; Take 1 tablet (100 mg) by mouth once daily.  Dispense: 90 tablet; Refill: 1    6. Recurrent falls  Refer to fall clinic to help with recurrent falls.  - Referral to Falls Clinic; Future           Signed by: Ar Jimenez DO       FUTURE DIRECTION:   []    Subjective   SUBJECTIVE:     HPI : Patient is a 78 y.o. female who presents today for the following:     T2DM  Glimepiride 4 mg bid, Metformin 1000 mg, Victoza 1.8mcg   Admits to poor diet  Uncontrolled        HTN/CAD  Carvedilol 12.5 mg,  "Imdur 60 mg, amlodipine 10 mg, baby aspirin       HLD  Atorvastatin 40 mg     Depression/anxiety  Zoloft 100 mg     COPD  Ventolin     Dysphagia  Doing better     Nicotine use  Tobacco use, not ready to quit      Osteoporosis  Takes vitamin D and calcium supplement     Social: goes to [place]           Review of Systems    History reviewed. No pertinent past medical history.     History reviewed. No pertinent surgical history.     Current Outpatient Medications   Medication Instructions    Accu-Chek Meghan Plus test strp strip Accu-Chek Meghan Plus In Vitro Strip   Quantity: 200  Refills: 0        Start : 2-Jun-2017   Active    amLODIPine (NORVASC) 10 mg, oral, Daily    aspirin 81 mg, oral, Daily    atorvastatin (LIPITOR) 40 mg, oral, Daily    carvedilol (COREG) 6.25 mg, oral, 2 times daily with meals    cyclobenzaprine (FLEXERIL) 5 mg, oral, Nightly PRN    glimepiride (AMARYL) 4 mg, oral, 2 times daily    IPRATROPIUM BROMIDE NASL nasal    isosorbide mononitrate ER (IMDUR) 60 mg, oral, Daily    metFORMIN (GLUCOPHAGE) 1,000 mg, oral, 2 times daily with meals    omeprazole (PRILOSEC) 40 mg, oral, Do not crush or chew.    pen needle, diabetic (NovoTwist) 32 gauge x 1/5\" needle miscellaneous    pioglitazone (ACTOS) 15 mg, oral, Daily    sertraline (ZOLOFT) 100 mg, oral, Daily    simvastatin (ZOCOR) 40 mg, oral    SITagliptin phosphate (JANUVIA) 100 mg, oral, Daily    Ventolin HFA 90 mcg/actuation inhaler 2 puffs, inhalation, Every 4 hours PRN    Victoza 2-Jed 1.8 mg, subcutaneous, Daily        Allergies   Allergen Reactions    Omeprazole Hives     hives    Ace Inhibitors Hives    Bupropion Hcl Hives     hives    Codeine Other    Penicillins Other    Sulfamethoxazole-Trimethoprim Hives        Social History     Socioeconomic History    Marital status:      Spouse name: Not on file    Number of children: Not on file    Years of education: Not on file    Highest education level: Not on file   Occupational History    " Not on file   Tobacco Use    Smoking status: Every Day     Packs/day: .5     Types: Cigarettes     Start date: 1964    Smokeless tobacco: Never   Substance and Sexual Activity    Alcohol use: Not on file    Drug use: Not on file    Sexual activity: Not on file   Other Topics Concern    Not on file   Social History Narrative    Not on file     Social Determinants of Health     Financial Resource Strain: Not on file   Food Insecurity: Not on file   Transportation Needs: Not on file   Physical Activity: Not on file   Stress: Not on file   Social Connections: Not on file   Intimate Partner Violence: Not on file   Housing Stability: Not on file        No family history on file.     Objective     OBJECTIVE:     Vitals:    02/01/24 1151   BP: 106/62   Pulse: 74   Temp: 36.1 °C (97 °F)   SpO2: 94%   Weight: 56.2 kg (124 lb)        Physical Exam  HENT:      Head: Normocephalic and atraumatic.      Nose: Nose normal.      Mouth/Throat:      Mouth: Mucous membranes are moist.   Eyes:      Pupils: Pupils are equal, round, and reactive to light.   Cardiovascular:      Rate and Rhythm: Normal rate and regular rhythm.      Pulses: Normal pulses.      Heart sounds: No murmur heard.  Pulmonary:      Effort: Pulmonary effort is normal.      Breath sounds: Normal breath sounds.   Abdominal:      Tenderness: There is no abdominal tenderness.   Musculoskeletal:         General: Normal range of motion.      Cervical back: Normal range of motion.   Skin:     General: Skin is warm and dry.   Neurological:      Mental Status: She is alert.   Psychiatric:         Mood and Affect: Mood normal.

## 2024-02-24 ENCOUNTER — LAB (OUTPATIENT)
Dept: LAB | Facility: LAB | Age: 79
End: 2024-02-24
Payer: MEDICARE

## 2024-02-24 DIAGNOSIS — E11.9 TYPE 2 DIABETES MELLITUS WITHOUT COMPLICATION, WITHOUT LONG-TERM CURRENT USE OF INSULIN (MULTI): ICD-10-CM

## 2024-02-24 DIAGNOSIS — E78.2 MIXED HYPERLIPIDEMIA: ICD-10-CM

## 2024-02-24 LAB
ANION GAP SERPL CALC-SCNC: 11 MMOL/L (ref 10–20)
BUN SERPL-MCNC: 20 MG/DL (ref 6–23)
CALCIUM SERPL-MCNC: 9.2 MG/DL (ref 8.6–10.3)
CHLORIDE SERPL-SCNC: 102 MMOL/L (ref 98–107)
CHOLEST SERPL-MCNC: 107 MG/DL (ref 0–199)
CHOLESTEROL/HDL RATIO: 2.7
CO2 SERPL-SCNC: 28 MMOL/L (ref 21–32)
CREAT SERPL-MCNC: 0.64 MG/DL (ref 0.5–1.05)
EGFRCR SERPLBLD CKD-EPI 2021: >90 ML/MIN/1.73M*2
EST. AVERAGE GLUCOSE BLD GHB EST-MCNC: 212 MG/DL
GLUCOSE SERPL-MCNC: 201 MG/DL (ref 74–99)
HBA1C MFR BLD: 9 %
HDLC SERPL-MCNC: 39.1 MG/DL
LDLC SERPL CALC-MCNC: 50 MG/DL
NON HDL CHOLESTEROL: 68 MG/DL (ref 0–149)
POTASSIUM SERPL-SCNC: 4.2 MMOL/L (ref 3.5–5.3)
SODIUM SERPL-SCNC: 137 MMOL/L (ref 136–145)
TRIGL SERPL-MCNC: 92 MG/DL (ref 0–149)
VLDL: 18 MG/DL (ref 0–40)

## 2024-02-24 PROCEDURE — 80048 BASIC METABOLIC PNL TOTAL CA: CPT

## 2024-02-24 PROCEDURE — 80061 LIPID PANEL: CPT

## 2024-02-24 PROCEDURE — 83036 HEMOGLOBIN GLYCOSYLATED A1C: CPT

## 2024-02-24 PROCEDURE — 36415 COLL VENOUS BLD VENIPUNCTURE: CPT

## 2024-02-27 ENCOUNTER — TELEPHONE (OUTPATIENT)
Dept: PRIMARY CARE | Facility: CLINIC | Age: 79
End: 2024-02-27
Payer: MEDICARE

## 2024-02-27 DIAGNOSIS — E11.9 TYPE 2 DIABETES MELLITUS WITHOUT COMPLICATION, WITHOUT LONG-TERM CURRENT USE OF INSULIN (MULTI): Primary | ICD-10-CM

## 2024-03-13 DIAGNOSIS — E11.65 TYPE 2 DIABETES MELLITUS WITH HYPERGLYCEMIA, UNSPECIFIED WHETHER LONG TERM INSULIN USE (MULTI): ICD-10-CM

## 2024-03-14 RX ORDER — GLIMEPIRIDE 4 MG/1
4 TABLET ORAL 2 TIMES DAILY
Qty: 180 TABLET | Refills: 1 | Status: SHIPPED | OUTPATIENT
Start: 2024-03-14

## 2024-03-18 DIAGNOSIS — E11.9 TYPE 2 DIABETES MELLITUS WITHOUT COMPLICATION, WITHOUT LONG-TERM CURRENT USE OF INSULIN (MULTI): Primary | ICD-10-CM

## 2024-03-18 NOTE — TELEPHONE ENCOUNTER
Pt called, she has 2 shots of Victoza left. Per pt no pharmacy has this in stock. Pt wanting to know what she can do? Switch to something else? Please advise. Thanks. JW

## 2024-03-20 ENCOUNTER — TELEPHONE (OUTPATIENT)
Dept: PULMONOLOGY | Facility: HOSPITAL | Age: 79
End: 2024-03-20

## 2024-03-20 ENCOUNTER — OFFICE VISIT (OUTPATIENT)
Dept: PULMONOLOGY | Facility: HOSPITAL | Age: 79
End: 2024-03-20
Payer: MEDICARE

## 2024-03-20 VITALS
WEIGHT: 122 LBS | OXYGEN SATURATION: 91 % | BODY MASS INDEX: 26.32 KG/M2 | DIASTOLIC BLOOD PRESSURE: 68 MMHG | HEIGHT: 57 IN | SYSTOLIC BLOOD PRESSURE: 151 MMHG | RESPIRATION RATE: 18 BRPM | HEART RATE: 95 BPM

## 2024-03-20 DIAGNOSIS — J44.9 CHRONIC OBSTRUCTIVE PULMONARY DISEASE, UNSPECIFIED COPD TYPE (MULTI): Primary | ICD-10-CM

## 2024-03-20 DIAGNOSIS — J30.9 ALLERGIC RHINITIS, UNSPECIFIED SEASONALITY, UNSPECIFIED TRIGGER: ICD-10-CM

## 2024-03-20 DIAGNOSIS — R05.9 COUGH, UNSPECIFIED TYPE: ICD-10-CM

## 2024-03-20 PROCEDURE — 4004F PT TOBACCO SCREEN RCVD TLK: CPT | Performed by: NURSE PRACTITIONER

## 2024-03-20 PROCEDURE — 3077F SYST BP >= 140 MM HG: CPT | Performed by: NURSE PRACTITIONER

## 2024-03-20 PROCEDURE — 3078F DIAST BP <80 MM HG: CPT | Performed by: NURSE PRACTITIONER

## 2024-03-20 PROCEDURE — 1159F MED LIST DOCD IN RCRD: CPT | Performed by: NURSE PRACTITIONER

## 2024-03-20 PROCEDURE — 1160F RVW MEDS BY RX/DR IN RCRD: CPT | Performed by: NURSE PRACTITIONER

## 2024-03-20 PROCEDURE — 99203 OFFICE O/P NEW LOW 30 MIN: CPT | Performed by: NURSE PRACTITIONER

## 2024-03-20 PROCEDURE — 99213 OFFICE O/P EST LOW 20 MIN: CPT | Mod: ZK | Performed by: NURSE PRACTITIONER

## 2024-03-20 RX ORDER — TIOTROPIUM BROMIDE AND OLODATEROL 3.124; 2.736 UG/1; UG/1
2 SPRAY, METERED RESPIRATORY (INHALATION) DAILY
Qty: 4 G | Refills: 11 | Status: SHIPPED | OUTPATIENT
Start: 2024-03-20

## 2024-03-20 RX ORDER — LORATADINE 10 MG/1
10 TABLET ORAL DAILY
Qty: 30 TABLET | Refills: 11 | Status: SHIPPED | OUTPATIENT
Start: 2024-03-20

## 2024-03-20 RX ORDER — UMECLIDINIUM BROMIDE AND VILANTEROL TRIFENATATE 62.5; 25 UG/1; UG/1
1 POWDER RESPIRATORY (INHALATION) DAILY
Qty: 1 EACH | Refills: 11 | Status: SHIPPED | OUTPATIENT
Start: 2024-03-20 | End: 2024-03-20 | Stop reason: WASHOUT

## 2024-03-20 ASSESSMENT — ENCOUNTER SYMPTOMS
FATIGUE: 0
FEVER: 0
CHILLS: 0
WHEEZING: 1
COUGH: 1
RHINORRHEA: 1
UNEXPECTED WEIGHT CHANGE: 0
SHORTNESS OF BREATH: 1

## 2024-03-20 NOTE — PROGRESS NOTES
Subjective   Patient ID: Funmilayo Montenegro is a 78 y.o. female who presents for NPV for COPD.     HPI: Patient has PMH of COPD, HTN, HLD, DMII, and depression. She was referred for COPD management. She reports shortness of breath at rest and with exertion. She has a daily productive cough that she has had as long as she remembers. She has albuterol that she uses a few times per week. She does report sinus drainage and will hear herself wheezing frequently. She has never been on maintenance therapy. Son states that she was coughing/choking after eating but patient states this is no longer happening. She is a current smoker at 3/4 ppd and mostly smoked 2 ppd since 1964. She has worked on a farm and in a factory.     Review of Systems   Constitutional:  Negative for chills, fatigue, fever and unexpected weight change.   HENT:  Positive for postnasal drip and rhinorrhea. Negative for congestion.    Respiratory:  Positive for cough (denies hemoptysis.), shortness of breath and wheezing.    Cardiovascular:  Negative for chest pain and leg swelling.   All other systems reviewed and are negative.      Objective   Physical Exam  Vitals reviewed.   Constitutional:       Appearance: Normal appearance.   HENT:      Head: Normocephalic.   Cardiovascular:      Rate and Rhythm: Normal rate and regular rhythm.   Pulmonary:      Effort: Pulmonary effort is normal.      Breath sounds: Decreased breath sounds present.   Skin:     General: Skin is warm and dry.   Neurological:      Mental Status: She is alert.         Assessment/Plan   COPD  Allergic rhinitis  Hypoxia   Nicotine dependence    Plan:    -PFTs ordered.  -Start Anoro one puff daily.  -Continue albuterol prn, encouraged use. Educated on inhaler use.  -Start Loratadine daily.  -6MWT ordered she is in a wheelchair and 91% on RA at rest.   -She is a current smoker at 3/4 ppd and has smoked mostly 2 ppd since 1964. She does not qualify for LDCT due to age but CXR was done in  November 2023 was normal.   -Son reports she was choking on food but patient states she is no longer doing this. I recommended a referral to GI if this continues, she declines this today.     Overall I will obtain testing and optimize COPD management. I will bring her back with me in 3 months. I instructed patient to call sooner if needed.

## 2024-03-20 NOTE — TELEPHONE ENCOUNTER
Patient acknowledged understanding. All questions answered at this time.     ----- Message from MATILDA Pulido sent at 3/20/2024  2:25 PM EDT -----  Let her know I sent Stiolto 2 puffs daily.  ----- Message -----  From: Astrid Conner RN  Sent: 3/20/2024   2:11 PM EDT  To: MATILDA Pulido    Patients insurance denied Anoro. They want her on Bevespi, spiriva, stiolto, or striverdi.

## 2024-03-20 NOTE — PATIENT INSTRUCTIONS
Start taking Anoro one puff once a day, everyday.  Continue to use albuterol as needed.  Start on Loratadine one tablet once a day for sinus drainage.   Please get lung and oxygen test done.   Call with any questions or concerns.  Follow up with me in 3 months.

## 2024-04-15 DIAGNOSIS — E11.9 TYPE 2 DIABETES MELLITUS WITHOUT COMPLICATION, WITHOUT LONG-TERM CURRENT USE OF INSULIN (MULTI): ICD-10-CM

## 2024-04-15 NOTE — TELEPHONE ENCOUNTER
Pt called requesting RF on Rybelsus. Need's this still due to no Victoza at pharm. Ok for RF?      Next OV 8/1/24  Pt compliant  Please advise. Thanks. JW

## 2024-05-02 ENCOUNTER — APPOINTMENT (OUTPATIENT)
Dept: RESPIRATORY THERAPY | Facility: HOSPITAL | Age: 79
End: 2024-05-02
Payer: MEDICARE

## 2024-05-16 ENCOUNTER — OFFICE VISIT (OUTPATIENT)
Dept: PRIMARY CARE | Facility: CLINIC | Age: 79
End: 2024-05-16
Payer: MEDICARE

## 2024-05-16 VITALS
HEART RATE: 88 BPM | DIASTOLIC BLOOD PRESSURE: 58 MMHG | TEMPERATURE: 97.9 F | OXYGEN SATURATION: 90 % | SYSTOLIC BLOOD PRESSURE: 120 MMHG

## 2024-05-16 DIAGNOSIS — E11.9 TYPE 2 DIABETES MELLITUS WITHOUT COMPLICATION, WITHOUT LONG-TERM CURRENT USE OF INSULIN (MULTI): ICD-10-CM

## 2024-05-16 DIAGNOSIS — N30.00 ACUTE CYSTITIS WITHOUT HEMATURIA: ICD-10-CM

## 2024-05-16 DIAGNOSIS — J44.1 CHRONIC OBSTRUCTIVE PULMONARY DISEASE WITH ACUTE EXACERBATION (MULTI): ICD-10-CM

## 2024-05-16 DIAGNOSIS — R35.0 URINARY FREQUENCY: Primary | ICD-10-CM

## 2024-05-16 PROBLEM — F33.42 RECURRENT MAJOR DEPRESSION IN FULL REMISSION (CMS-HCC): Status: ACTIVE | Noted: 2023-08-01

## 2024-05-16 LAB
POC APPEARANCE, URINE: CLEAR
POC BILIRUBIN, URINE: ABNORMAL
POC BLOOD, URINE: NEGATIVE
POC COLOR, URINE: YELLOW
POC GLUCOSE, URINE: ABNORMAL MG/DL
POC KETONES, URINE: ABNORMAL MG/DL
POC LEUKOCYTES, URINE: ABNORMAL
POC NITRITE,URINE: NEGATIVE
POC PH, URINE: 6 PH
POC PROTEIN, URINE: ABNORMAL MG/DL
POC SPECIFIC GRAVITY, URINE: 1.02
POC UROBILINOGEN, URINE: 0.2 EU/DL

## 2024-05-16 PROCEDURE — 87086 URINE CULTURE/COLONY COUNT: CPT

## 2024-05-16 PROCEDURE — 81003 URINALYSIS AUTO W/O SCOPE: CPT | Performed by: FAMILY MEDICINE

## 2024-05-16 PROCEDURE — 3074F SYST BP LT 130 MM HG: CPT | Performed by: FAMILY MEDICINE

## 2024-05-16 PROCEDURE — 99214 OFFICE O/P EST MOD 30 MIN: CPT | Performed by: FAMILY MEDICINE

## 2024-05-16 PROCEDURE — 4004F PT TOBACCO SCREEN RCVD TLK: CPT | Performed by: FAMILY MEDICINE

## 2024-05-16 PROCEDURE — 1159F MED LIST DOCD IN RCRD: CPT | Performed by: FAMILY MEDICINE

## 2024-05-16 PROCEDURE — 3078F DIAST BP <80 MM HG: CPT | Performed by: FAMILY MEDICINE

## 2024-05-16 PROCEDURE — 1160F RVW MEDS BY RX/DR IN RCRD: CPT | Performed by: FAMILY MEDICINE

## 2024-05-16 RX ORDER — CIPROFLOXACIN 500 MG/1
500 TABLET ORAL 2 TIMES DAILY
Qty: 10 TABLET | Refills: 0 | Status: SHIPPED | OUTPATIENT
Start: 2024-05-16 | End: 2024-05-21

## 2024-05-16 RX ORDER — PREDNISONE 10 MG/1
TABLET ORAL DAILY
Qty: 11 TABLET | Refills: 0 | Status: SHIPPED | OUTPATIENT
Start: 2024-05-16 | End: 2024-05-21

## 2024-05-16 NOTE — PATIENT INSTRUCTIONS
Urinary frequency: will check ua in the office   COPD exacerebation: likely causing shortness of breath and increased cough, will order steroid taper and abx   Pt advised to hold off on atorvastatin while taking cipro to avoid AE  Discussed Ed precautions  Also advised patient use walker to help with ambulation

## 2024-05-16 NOTE — PROGRESS NOTES
Assessment/Plan   ASSESSMENT/PLAN:      Patient Instructions   Urinary frequency: will check ua in the office   COPD exacerebation: likely causing shortness of breath and increased cough, will order steroid taper and abx   Pt advised to hold off on atorvastatin while taking cipro to avoid AE  Discussed Ed precautions  Also advised patient use walker to help with ambulation     Ar Jimenez DO     FUTURE DIRECTION:     Subjective   SUBJECTIVE:     Patient ID: Funmilayo Montenegro is a 79 y.o. femalefor the following:    Started last week with cough with sputum   Has been feeling short of breath without CP   Was seen by pulmonology and started on anoro, still taking albuterol which seems to help   Subsequently, pt is experiencing urinary frequency and urgency since last week   Mentions subjective fever and chills       Review of Systems    Allergies   Allergen Reactions    Omeprazole Hives     hives    Ace Inhibitors Hives    Bupropion Hives    Bupropion Hcl Hives     hives    Codeine Other    Penicillins Other    Sulfamethoxazole-Trimethoprim Hives         Current Outpatient Medications:     Accu-Chek Meghan Plus test strp strip, Accu-Chek Meghan Plus In Vitro Strip  Quantity: 200  Refills: 0      Start : 2-Jun-2017  Active, Disp: , Rfl:     amLODIPine (Norvasc) 10 mg tablet, Take 1 tablet (10 mg) by mouth once daily., Disp: 90 tablet, Rfl: 1    aspirin 81 mg EC tablet, Take 1 tablet (81 mg) by mouth once daily., Disp: , Rfl:     atorvastatin (Lipitor) 40 mg tablet, Take 1 tablet (40 mg) by mouth once daily., Disp: 90 tablet, Rfl: 1    carvedilol (Coreg) 6.25 mg tablet, Take 1 tablet (6.25 mg) by mouth 2 times a day with meals., Disp: 60 tablet, Rfl: 11    cyclobenzaprine (Flexeril) 5 mg tablet, Take 1 tablet (5 mg) by mouth as needed at bedtime., Disp: , Rfl:     glimepiride (Amaryl) 4 mg tablet, TAKE 1 TABLET TWICE DAILY, Disp: 180 tablet, Rfl: 1    IPRATROPIUM BROMIDE NASL, Administer into affected  "nostril(s)., Disp: , Rfl:     isosorbide mononitrate ER (Imdur) 60 mg 24 hr tablet, Take 1 tablet (60 mg) by mouth once daily., Disp: 90 tablet, Rfl: 1    loratadine (Claritin) 10 mg tablet, Take 1 tablet (10 mg) by mouth once daily., Disp: 30 tablet, Rfl: 11    metFORMIN (Glucophage) 1,000 mg tablet, Take 1 tablet (1,000 mg) by mouth 2 times a day with meals., Disp: 180 tablet, Rfl: 1    omeprazole (PriLOSEC) 40 mg DR capsule, Take 1 capsule (40 mg) by mouth. Do not crush or chew., Disp: , Rfl:     pen needle, diabetic (NovoTwist) 32 gauge x 1/5\" needle, , Disp: , Rfl:     pioglitazone (Actos) 15 mg tablet, Take 1 tablet (15 mg) by mouth once daily., Disp: 30 tablet, Rfl: 11    semaglutide (Rybelsus) 3 mg tablet, Take 1 tablet (3 mg) by mouth once daily., Disp: 30 tablet, Rfl: 0    sertraline (Zoloft) 100 mg tablet, Take 1 tablet (100 mg) by mouth once daily., Disp: 90 tablet, Rfl: 1    simvastatin (Zocor) 40 mg tablet, Take 1 tablet (40 mg) by mouth., Disp: , Rfl:     SITagliptin phosphate (Januvia) 100 mg tablet, Take 1 tablet (100 mg) by mouth once daily., Disp: , Rfl:     tiotropium-olodateroL (Stiolto Respimat) 2.5-2.5 mcg/actuation mist inhaler, Inhale 2 Inhalations once daily., Disp: 4 g, Rfl: 11    Ventolin HFA 90 mcg/actuation inhaler, Inhale 2 puffs every 4 hours if needed., Disp: , Rfl:     ciprofloxacin (Cipro) 500 mg tablet, Take 1 tablet (500 mg) by mouth 2 times a day for 5 days., Disp: 10 tablet, Rfl: 0    liraglutide (Victoza 2-Jed) 0.6 mg/0.1 mL (18 mg/3 mL) injection, Inject 0.3 mL (1.8 mg) under the skin once daily. (Patient not taking: Reported on 5/16/2024), Disp: 27 mL, Rfl: 1    predniSONE (Deltasone) 10 mg tablet, Take 4 tablets (40 mg) by mouth once daily for 1 day, THEN 3 tablets (30 mg) once daily for 1 day, THEN 2 tablets (20 mg) once daily for 1 day, THEN 1 tablet (10 mg) once daily for 1 day, THEN 0.5 tablets (5 mg) once daily for 2 days., Disp: 11 tablet, Rfl: 0     Patient Active " Problem List   Diagnosis    Atherosclerosis of aorta (CMS-HCC)    Chronic obstructive lung disease (Multi)    Coronary arteriosclerosis    Essential hypertension    Gastroesophageal reflux disease    Major depressive disorder    Mixed hyperlipidemia    Type 2 diabetes mellitus with diabetic nephropathy, without long-term current use of insulin (Multi)    Restless legs    Osteoporosis    Acquired spondylolisthesis of lumbosacral region    H/O idiopathic urticaria    Recurrent falls    Lumbar radiculopathy    Cigarette nicotine dependence with nicotine-induced disorder    Recurrent major depression in full remission (CMS-HCC)       Social History     Socioeconomic History    Marital status:      Spouse name: Not on file    Number of children: Not on file    Years of education: Not on file    Highest education level: Not on file   Occupational History    Not on file   Tobacco Use    Smoking status: Every Day     Current packs/day: 0.75     Average packs/day: 0.8 packs/day for 60.4 years (45.3 ttl pk-yrs)     Types: Cigarettes     Start date: 1964    Smokeless tobacco: Never   Substance and Sexual Activity    Alcohol use: Not on file    Drug use: Not on file    Sexual activity: Not on file   Other Topics Concern    Not on file   Social History Narrative    Not on file     Social Determinants of Health     Financial Resource Strain: Not on file   Food Insecurity: Not on file   Transportation Needs: Not on file   Physical Activity: Not on file   Stress: Not on file   Social Connections: Not on file   Intimate Partner Violence: Not on file   Housing Stability: Not on file       Objective   OBJECTIVE:     Vitals:    05/16/24 1424   BP: 120/58   Pulse: 88   Temp: 36.6 °C (97.9 °F)   SpO2: 90%       Exam      Physical Exam  Constitutional:       Appearance: Normal appearance.      Comments: Slowly ambulating with cane    HENT:      Head: Normocephalic.   Cardiovascular:      Rate and Rhythm: Normal rate and regular  rhythm.   Pulmonary:      Effort: Pulmonary effort is normal.      Comments: Decreased breath sounds in BL lower lung fields   Abdominal:      Tenderness: There is no right CVA tenderness or left CVA tenderness.   Musculoskeletal:      Cervical back: Normal range of motion.   Neurological:      Mental Status: She is alert.   Psychiatric:         Mood and Affect: Mood normal.

## 2024-05-18 LAB — BACTERIA UR CULT: NORMAL

## 2024-05-20 DIAGNOSIS — E11.9 TYPE 2 DIABETES MELLITUS WITHOUT COMPLICATION, WITHOUT LONG-TERM CURRENT USE OF INSULIN (MULTI): ICD-10-CM

## 2024-05-21 ENCOUNTER — TELEPHONE (OUTPATIENT)
Dept: PRIMARY CARE | Facility: CLINIC | Age: 79
End: 2024-05-21
Payer: MEDICARE

## 2024-05-21 NOTE — TELEPHONE ENCOUNTER
----- Message from Ar Jimenez DO sent at 5/21/2024  9:18 AM EDT -----  Please call patient and let then know the following:  Stop abx, no bacterial growth  in urine. RTC if sx are persisting

## 2024-05-24 ENCOUNTER — TELEMEDICINE (OUTPATIENT)
Dept: PHARMACY | Facility: HOSPITAL | Age: 79
End: 2024-05-24
Payer: MEDICARE

## 2024-05-24 DIAGNOSIS — E11.21 TYPE 2 DIABETES MELLITUS WITH DIABETIC NEPHROPATHY, WITHOUT LONG-TERM CURRENT USE OF INSULIN (MULTI): Primary | ICD-10-CM

## 2024-05-24 DIAGNOSIS — E11.9 TYPE 2 DIABETES MELLITUS WITHOUT COMPLICATION, WITHOUT LONG-TERM CURRENT USE OF INSULIN (MULTI): ICD-10-CM

## 2024-05-24 PROCEDURE — RXMED WILLOW AMBULATORY MEDICATION CHARGE

## 2024-05-24 RX ORDER — LIRAGLUTIDE 6 MG/ML
1.8 INJECTION SUBCUTANEOUS DAILY
Qty: 9 ML | Refills: 3 | Status: SHIPPED | OUTPATIENT
Start: 2024-05-24

## 2024-05-26 ENCOUNTER — PHARMACY VISIT (OUTPATIENT)
Dept: PHARMACY | Facility: CLINIC | Age: 79
End: 2024-05-26
Payer: COMMERCIAL

## 2024-05-31 ENCOUNTER — TELEMEDICINE (OUTPATIENT)
Dept: PHARMACY | Facility: HOSPITAL | Age: 79
End: 2024-05-31
Payer: MEDICARE

## 2024-05-31 DIAGNOSIS — E11.21 TYPE 2 DIABETES MELLITUS WITH DIABETIC NEPHROPATHY, WITHOUT LONG-TERM CURRENT USE OF INSULIN (MULTI): ICD-10-CM

## 2024-05-31 NOTE — PROGRESS NOTES
Pharmacist Clinic: Post Discharge Initial Visit  Funmilayo Montenegro is a 79 y.o. female was referred to Clinical Pharmacy Team for diabetes management.     Referring Provider: Ar Jimenez DO     HISTORY OF PRESENT ILLNESS  Patient was on Victoza but her pharmacies have been out of stock due to shortages.  She was switched to Rybelsus in the interim which has not been providing adequate control.      LAB REVIEW   Glucose (mg/dL)   Date Value   02/24/2024 201 (H)   07/29/2023 190 (H)   02/04/2023 242 (H)   08/12/2022 168 (H)     Hemoglobin A1C (%)   Date Value   02/24/2024 9.0 (H)   07/29/2023 9.4 (A)   02/04/2023 8.6 (A)   08/12/2022 8.0 (A)     Bicarbonate (mmol/L)   Date Value   02/24/2024 28   07/29/2023 27   02/04/2023 32   08/12/2022 30     Urea Nitrogen (mg/dL)   Date Value   02/24/2024 20   07/29/2023 13   02/04/2023 11   08/12/2022 9     Creatinine (mg/dL)   Date Value   02/24/2024 0.64   07/29/2023 0.60   02/04/2023 0.54   08/12/2022 0.52     Lab Results   Component Value Date    HGBA1C 9.0 (H) 02/24/2024    HGBA1C 9.4 (A) 07/29/2023    HGBA1C 8.6 (A) 02/04/2023     Lab Results   Component Value Date    CHOL 107 02/24/2024    CHOL 109 07/29/2023    CHOL 101 08/12/2022     Lab Results   Component Value Date    HDL 39.1 02/24/2024    HDL 36.7 (A) 07/29/2023    HDL 29.1 (A) 08/12/2022     Lab Results   Component Value Date    LDLCALC 50 02/24/2024     Lab Results   Component Value Date    TRIG 92 02/24/2024    TRIG 112 07/29/2023    TRIG 219 (H) 08/12/2022       DIABETES ASSESSMENT    CURRENT PHARMACOTHERAPY  - glimepiride 4 mg twice daily  -metformin 1000mg twice daily  -pioglitazone 15 mg daily  -Rybelsus 3 mg daily    SECONDARY PREVENTION  - Statin? Yes  - ACE-I/ARB? No  - Aspirin? Yes    HISTORICAL PHARMACOTHERAPY  - Victoza 1.8 mg daily    SMBG MEASUREMENTS  This morning fasting 300  Reported that when on Victoza -200s    Patient does not report symptoms of hypoglycemia.   Patient does not  report symptoms of hyperglycemia.       RECOMMENDATIONS/PLAN  1. Patients diabetes is poorly controlled with most recent A1c of 9% (goal < 7 %).   -Was able to  Victoza from [facility] last week but has not started yet  - States she has Rybelsus 3 mg tablets remaining that she does not want to waste, instructed patient to take two tablets (total dose 6 mg) until finished, then start Victoza    2. Education:   - Counseled patient on MOA, expectations, side effects, duration of therapy, contraindications, administration, and monitoring parameters  - Answered all patient questions and concerns    Clinical Pharmacist follow up: 6/28/24 1100    Klarissa Virk PharmD  Clinical Pharmacy Specialist, Primary Care   [phone]    Continue all meds under the continuation of care with the referring provider and clinical pharmacy team.    Verbal consent to manage patient's drug therapy was obtained from [the patient or an individual authorized to act on behalf of a patient]. They were informed they may decline to participate or withdraw from participation in pharmacy services at any time.

## 2024-06-12 ENCOUNTER — APPOINTMENT (OUTPATIENT)
Dept: PULMONOLOGY | Facility: HOSPITAL | Age: 79
End: 2024-06-12
Payer: MEDICARE

## 2024-06-28 ENCOUNTER — APPOINTMENT (OUTPATIENT)
Dept: PHARMACY | Facility: HOSPITAL | Age: 79
End: 2024-06-28
Payer: MEDICARE

## 2024-07-05 DIAGNOSIS — E11.9 TYPE 2 DIABETES MELLITUS WITHOUT COMPLICATION, WITHOUT LONG-TERM CURRENT USE OF INSULIN (MULTI): ICD-10-CM

## 2024-07-05 NOTE — TELEPHONE ENCOUNTER
Pt is out of the victoza but the pharmacy does not have it in stock and pt is asking what else the dr could prescribe for her to replace it

## 2024-07-12 DIAGNOSIS — E78.2 MIXED HYPERLIPIDEMIA: ICD-10-CM

## 2024-07-12 RX ORDER — ATORVASTATIN CALCIUM 40 MG/1
40 TABLET, FILM COATED ORAL DAILY
Qty: 90 TABLET | Refills: 3 | OUTPATIENT
Start: 2024-07-12

## 2024-07-16 ENCOUNTER — APPOINTMENT (OUTPATIENT)
Dept: PHARMACY | Facility: HOSPITAL | Age: 79
End: 2024-07-16
Payer: MEDICARE

## 2024-07-16 DIAGNOSIS — E11.9 TYPE 2 DIABETES MELLITUS WITHOUT COMPLICATION, WITHOUT LONG-TERM CURRENT USE OF INSULIN (MULTI): Primary | ICD-10-CM

## 2024-07-16 NOTE — PROGRESS NOTES
Pharmacist Clinic: Diabetes Management  Funmilayo Montenegro is a 79 y.o. female was referred to Clinical Pharmacy Team for diabetes management.   Referring Provider: Ar Jimenez, *  - Last visit with referring provider: 5/16/24    Subjective     HPI    Current Diabetes Pharmacotherapy:    - Glimepiride 4 mg BID AC  - Victoza 1.8 mg daily  - Metformin 1000 mg BID  - Pioglitazone 15 mg daily      Social History:  Current diet:   - Breakfast  - Lunch: grabs something on the go  - Dinner: Sometimes eats 2-3 bites and gets full   - Dinner: Chicken, roast beef, pork, veggies  - Drinks a lot of coffee   - Sugar free foods/drinks     Current exercise:   - None  - Has cane/walker due to back issues    Eye exam for this year?: No  Foot exam for this year?:  No    Current monitoring regimen:   Patient is using: glucometer  Type of CGM: N/A    Patient is testing blood sugars 2 times a day.    Reported blood sugars:     1-2 hours after breakfast:   - She states has been terrible because of medications  - States she was on steroid taper for COPD  - Sometimes 300's   - This week: Around 150-200    2-Hours Post Prandial:   - Around 150-200s    Any episodes of hypoglycemia? no    Adverse Effects: None    Objective     There were no vitals taken for this visit.    Allergies   Allergen Reactions    Omeprazole Hives     hives    Ace Inhibitors Hives    Bupropion Hives    Bupropion Hcl Hives     hives    Codeine Other    Penicillins Other    Sulfamethoxazole-Trimethoprim Hives       Historical Diabetes Pharmacotherapy:  - Rybelsus (in replacement of Victoza)    SECONDARY PREVENTION  - Statin? Yes   - ACE-I/ARB? No  - Aspirin? Yes    Pertinent PMH Review:  - PMH of Pancreatitis: No  - PMH of Retinopathy: No  - PMH of Urinary Tract Infections: No  - PMH of Yeast Infections: No  - PMH of MTC: No    Lab Review  Lab Results   Component Value Date    BILITOT 0.5 02/04/2023    CALCIUM 9.2 02/24/2024    CO2 28 02/24/2024      02/24/2024    CREATININE 0.64 02/24/2024    GLUCOSE 201 (H) 02/24/2024    ALKPHOS 52 02/04/2023    K 4.2 02/24/2024    PROT 7.6 02/04/2023     02/24/2024    AST 13 02/04/2023    ALT 22 02/04/2023    BUN 20 02/24/2024    ANIONGAP 11 02/24/2024     07/14/2022    ALBUMIN 4.3 02/04/2023    GFRF >90 07/29/2023     Lab Results   Component Value Date    TRIG 92 02/24/2024    CHOL 107 02/24/2024    HDL 39.1 02/24/2024     Lab Results   Component Value Date    HGBA1C 9.0 (H) 02/24/2024    HGBA1C 9.4 (A) 07/29/2023    HGBA1C 8.6 (A) 02/04/2023     The ASCVD Risk score (Michael ESPINOZA, et al., 2019) failed to calculate for the following reasons:    The valid total cholesterol range is 130 to 320 mg/dL    Drug Interactions:  None    Affordability/Accessibility:  - Household: (5)  - Files taxes  - < $61,000     Preferred Pharmacy:  - [facility] ([place], [place])    Assessment/Plan   Problem List Items Addressed This Visit    None      ASSESSMENT:  Patients diabetes is uncontrolled with most recent A1c of 9%.     Patient has restarted Victoza. Sugars are still running high. Patient should qualify for PAP so I will get that started for her. We will switch her from Victoza to Trulicity for better BG control. I will send that through PAP (she would also like to get Stiolto through PAP). For now she will continue Victoza as she just got a new box and we will send her Trulicity once she provides her tax documents and is approved. She is also due for an A1c, will order today.     PULMONARY ASSESSMENT  Patient has been diagnosed with: COPD  does see a pulmonologist  Last visit: 3/20/24    Current Regimen:  Stiolto   Ventolin    Appropriate Inhaler Technique? yes    Historical Treatment:  Unknown      Smoking history:  She currently smokes 0.75 packs per day.       PLAN:  CONTINUE Victoza until we are able to get Trulicity through PAP  Follow up with clinical pharmacist: 8/16/24 @ 9:30  Follow up with PCP: 8/1/24    Thank  you,  Imani Borja, PharmD  Clinical Pharmacy Specialist  [phone]  [email or web address]     Continue all meds under the continuation of care with the referring provider and clinical pharmacy team.

## 2024-07-27 ENCOUNTER — LAB (OUTPATIENT)
Dept: LAB | Facility: LAB | Age: 79
End: 2024-07-27
Payer: MEDICARE

## 2024-07-27 DIAGNOSIS — E78.2 MIXED HYPERLIPIDEMIA: ICD-10-CM

## 2024-07-27 DIAGNOSIS — E11.9 TYPE 2 DIABETES MELLITUS WITHOUT COMPLICATION, WITHOUT LONG-TERM CURRENT USE OF INSULIN (MULTI): ICD-10-CM

## 2024-07-27 LAB
ANION GAP SERPL CALC-SCNC: 10 MMOL/L (ref 10–20)
BUN SERPL-MCNC: 11 MG/DL (ref 6–23)
CALCIUM SERPL-MCNC: 9.6 MG/DL (ref 8.6–10.3)
CHLORIDE SERPL-SCNC: 101 MMOL/L (ref 98–107)
CHOLEST SERPL-MCNC: 140 MG/DL (ref 0–199)
CHOLESTEROL/HDL RATIO: 3
CO2 SERPL-SCNC: 31 MMOL/L (ref 21–32)
CREAT SERPL-MCNC: 0.57 MG/DL (ref 0.5–1.05)
EGFRCR SERPLBLD CKD-EPI 2021: >90 ML/MIN/1.73M*2
EST. AVERAGE GLUCOSE BLD GHB EST-MCNC: 194 MG/DL
GLUCOSE SERPL-MCNC: 162 MG/DL (ref 74–99)
HBA1C MFR BLD: 8.4 %
HDLC SERPL-MCNC: 47.2 MG/DL
LDLC SERPL CALC-MCNC: 61 MG/DL
NON HDL CHOLESTEROL: 93 MG/DL (ref 0–149)
POTASSIUM SERPL-SCNC: 4.4 MMOL/L (ref 3.5–5.3)
SODIUM SERPL-SCNC: 138 MMOL/L (ref 136–145)
TRIGL SERPL-MCNC: 158 MG/DL (ref 0–149)
VLDL: 32 MG/DL (ref 0–40)

## 2024-07-27 PROCEDURE — 80061 LIPID PANEL: CPT

## 2024-07-27 PROCEDURE — 83036 HEMOGLOBIN GLYCOSYLATED A1C: CPT

## 2024-07-27 PROCEDURE — 36415 COLL VENOUS BLD VENIPUNCTURE: CPT

## 2024-07-27 PROCEDURE — 80048 BASIC METABOLIC PNL TOTAL CA: CPT

## 2024-07-29 ENCOUNTER — TELEPHONE (OUTPATIENT)
Dept: PHARMACY | Facility: HOSPITAL | Age: 79
End: 2024-07-29
Payer: MEDICARE

## 2024-07-29 PROCEDURE — RXMED WILLOW AMBULATORY MEDICATION CHARGE

## 2024-07-29 NOTE — TELEPHONE ENCOUNTER
Patient Assistance Program Approval:     We are pleased to inform you that your application for assistance has been approved.    This approval is valid through 7/29/25 as long as the following criteria continue to be satisfied:     Your medication (Stiolto and Trulicity) remains covered under your current insurance plan.   Your prescriber does not discontinue therapy.   You do not seek reimbursement from any other private or government-funded programs for the  medication.    Under this program, the pharmacy will first bill your insurance plan for your specified medication. The [employer] Assistance Fund will then offset your copay balance, so that your out-of pocket expense for your medication will be $0.00.     Medication will be filled through [facility], and mailed to the patient. Contacted on the status of the approval. Provided the [facility] phone number, and made aware that they will be hearing from someone at [facility] to set up delivery for the prescriptions.     Please reach out to the Clinical Pharmacy Team if there are any further questions.     Imani Borja, PharmD  Clinical Pharmacy Specialist - Primary Care  [phone]  [email or web address]      Continue all meds under the continuation of care with the referring provider and clinical pharmacy team.    Verbal consent to manage patient's drug therapy was obtained from patient. They were informed they may decline to participate or withdraw from participation in pharmacy services at any time.

## 2024-07-31 DIAGNOSIS — E11.9 TYPE 2 DIABETES MELLITUS WITHOUT COMPLICATION, WITHOUT LONG-TERM CURRENT USE OF INSULIN (MULTI): ICD-10-CM

## 2024-07-31 DIAGNOSIS — F33.42 RECURRENT MAJOR DEPRESSIVE DISORDER, IN FULL REMISSION (CMS-HCC): ICD-10-CM

## 2024-07-31 DIAGNOSIS — I10 ESSENTIAL HYPERTENSION: ICD-10-CM

## 2024-07-31 RX ORDER — SERTRALINE HYDROCHLORIDE 100 MG/1
100 TABLET, FILM COATED ORAL DAILY
Qty: 90 TABLET | Refills: 3 | OUTPATIENT
Start: 2024-07-31

## 2024-07-31 RX ORDER — METFORMIN HYDROCHLORIDE 1000 MG/1
1000 TABLET ORAL
Qty: 180 TABLET | Refills: 3 | OUTPATIENT
Start: 2024-07-31

## 2024-07-31 RX ORDER — AMLODIPINE BESYLATE 10 MG/1
10 TABLET ORAL DAILY
Qty: 90 TABLET | Refills: 3 | OUTPATIENT
Start: 2024-07-31

## 2024-07-31 RX ORDER — ISOSORBIDE MONONITRATE 60 MG/1
60 TABLET, EXTENDED RELEASE ORAL DAILY
Qty: 90 TABLET | Refills: 3 | OUTPATIENT
Start: 2024-07-31

## 2024-08-01 ENCOUNTER — PHARMACY VISIT (OUTPATIENT)
Dept: PHARMACY | Facility: CLINIC | Age: 79
End: 2024-08-01
Payer: COMMERCIAL

## 2024-08-01 ENCOUNTER — APPOINTMENT (OUTPATIENT)
Dept: PRIMARY CARE | Facility: CLINIC | Age: 79
End: 2024-08-01
Payer: MEDICARE

## 2024-08-01 VITALS
BODY MASS INDEX: 25.19 KG/M2 | TEMPERATURE: 97.9 F | OXYGEN SATURATION: 98 % | HEART RATE: 86 BPM | SYSTOLIC BLOOD PRESSURE: 122 MMHG | WEIGHT: 120 LBS | DIASTOLIC BLOOD PRESSURE: 70 MMHG | HEIGHT: 58 IN

## 2024-08-01 DIAGNOSIS — I10 ESSENTIAL HYPERTENSION: ICD-10-CM

## 2024-08-01 DIAGNOSIS — E11.21 TYPE 2 DIABETES MELLITUS WITH DIABETIC NEPHROPATHY, WITHOUT LONG-TERM CURRENT USE OF INSULIN (MULTI): ICD-10-CM

## 2024-08-01 DIAGNOSIS — Z00.00 ROUTINE GENERAL MEDICAL EXAMINATION AT A HEALTH CARE FACILITY: ICD-10-CM

## 2024-08-01 DIAGNOSIS — Z13.820 ENCOUNTER FOR OSTEOPOROSIS SCREENING IN ASYMPTOMATIC POSTMENOPAUSAL PATIENT: ICD-10-CM

## 2024-08-01 DIAGNOSIS — I70.0 ATHEROSCLEROSIS OF AORTA (CMS-HCC): ICD-10-CM

## 2024-08-01 DIAGNOSIS — F17.219 CIGARETTE NICOTINE DEPENDENCE WITH NICOTINE-INDUCED DISORDER: Primary | ICD-10-CM

## 2024-08-01 DIAGNOSIS — Z78.0 ENCOUNTER FOR OSTEOPOROSIS SCREENING IN ASYMPTOMATIC POSTMENOPAUSAL PATIENT: ICD-10-CM

## 2024-08-01 DIAGNOSIS — E11.65 TYPE 2 DIABETES MELLITUS WITH HYPERGLYCEMIA, UNSPECIFIED WHETHER LONG TERM INSULIN USE (MULTI): ICD-10-CM

## 2024-08-01 DIAGNOSIS — E78.2 MIXED HYPERLIPIDEMIA: ICD-10-CM

## 2024-08-01 DIAGNOSIS — E11.9 TYPE 2 DIABETES MELLITUS WITHOUT COMPLICATION, WITHOUT LONG-TERM CURRENT USE OF INSULIN (MULTI): ICD-10-CM

## 2024-08-01 DIAGNOSIS — F33.42 RECURRENT MAJOR DEPRESSIVE DISORDER, IN FULL REMISSION (CMS-HCC): ICD-10-CM

## 2024-08-01 DIAGNOSIS — Z13.820 OSTEOPOROSIS SCREENING: ICD-10-CM

## 2024-08-01 PROCEDURE — 3078F DIAST BP <80 MM HG: CPT | Performed by: FAMILY MEDICINE

## 2024-08-01 PROCEDURE — 1159F MED LIST DOCD IN RCRD: CPT | Performed by: FAMILY MEDICINE

## 2024-08-01 PROCEDURE — 99214 OFFICE O/P EST MOD 30 MIN: CPT | Performed by: FAMILY MEDICINE

## 2024-08-01 PROCEDURE — G0446 INTENS BEHAVE THER CARDIO DX: HCPCS | Performed by: FAMILY MEDICINE

## 2024-08-01 PROCEDURE — G0439 PPPS, SUBSEQ VISIT: HCPCS | Performed by: FAMILY MEDICINE

## 2024-08-01 PROCEDURE — 3074F SYST BP LT 130 MM HG: CPT | Performed by: FAMILY MEDICINE

## 2024-08-01 PROCEDURE — 1160F RVW MEDS BY RX/DR IN RCRD: CPT | Performed by: FAMILY MEDICINE

## 2024-08-01 PROCEDURE — 99397 PER PM REEVAL EST PAT 65+ YR: CPT | Performed by: FAMILY MEDICINE

## 2024-08-01 PROCEDURE — 4004F PT TOBACCO SCREEN RCVD TLK: CPT | Performed by: FAMILY MEDICINE

## 2024-08-01 PROCEDURE — 1170F FXNL STATUS ASSESSED: CPT | Performed by: FAMILY MEDICINE

## 2024-08-01 RX ORDER — METFORMIN HYDROCHLORIDE 1000 MG/1
1000 TABLET ORAL
Qty: 180 TABLET | Refills: 3 | Status: SHIPPED | OUTPATIENT
Start: 2024-08-01 | End: 2025-08-01

## 2024-08-01 RX ORDER — AMLODIPINE BESYLATE 10 MG/1
10 TABLET ORAL DAILY
Qty: 90 TABLET | Refills: 3 | Status: SHIPPED | OUTPATIENT
Start: 2024-08-01 | End: 2025-08-01

## 2024-08-01 RX ORDER — ATORVASTATIN CALCIUM 40 MG/1
40 TABLET, FILM COATED ORAL DAILY
Qty: 90 TABLET | Refills: 3 | Status: SHIPPED | OUTPATIENT
Start: 2024-08-01 | End: 2025-08-01

## 2024-08-01 RX ORDER — CARVEDILOL 6.25 MG/1
6.25 TABLET ORAL
Qty: 60 TABLET | Refills: 11 | Status: CANCELLED | OUTPATIENT
Start: 2024-08-01 | End: 2025-08-01

## 2024-08-01 RX ORDER — ISOSORBIDE MONONITRATE 60 MG/1
60 TABLET, EXTENDED RELEASE ORAL DAILY
Qty: 90 TABLET | Refills: 3 | Status: SHIPPED | OUTPATIENT
Start: 2024-08-01 | End: 2025-08-01

## 2024-08-01 RX ORDER — SERTRALINE HYDROCHLORIDE 100 MG/1
100 TABLET, FILM COATED ORAL DAILY
Qty: 90 TABLET | Refills: 3 | Status: SHIPPED | OUTPATIENT
Start: 2024-08-01 | End: 2025-08-01

## 2024-08-01 RX ORDER — GLIMEPIRIDE 4 MG/1
4 TABLET ORAL 2 TIMES DAILY
Qty: 180 TABLET | Refills: 3 | Status: SHIPPED | OUTPATIENT
Start: 2024-08-01 | End: 2025-08-01

## 2024-08-01 RX ORDER — CARVEDILOL 6.25 MG/1
6.25 TABLET ORAL
Qty: 180 TABLET | Refills: 3 | Status: SHIPPED | OUTPATIENT
Start: 2024-08-01 | End: 2025-08-01

## 2024-08-01 RX ORDER — PIOGLITAZONEHYDROCHLORIDE 15 MG/1
15 TABLET ORAL DAILY
Qty: 90 TABLET | Refills: 3 | Status: SHIPPED | OUTPATIENT
Start: 2024-08-01 | End: 2025-08-01

## 2024-08-01 ASSESSMENT — ENCOUNTER SYMPTOMS: DIZZINESS: 1

## 2024-08-01 ASSESSMENT — ACTIVITIES OF DAILY LIVING (ADL)
BATHING: NEEDS ASSISTANCE
GROCERY_SHOPPING: NEEDS ASSISTANCE
DOING_HOUSEWORK: NEEDS ASSISTANCE
DRESSING: INDEPENDENT
MANAGING_FINANCES: INDEPENDENT
TAKING_MEDICATION: INDEPENDENT

## 2024-08-01 ASSESSMENT — PATIENT HEALTH QUESTIONNAIRE - PHQ9
SUM OF ALL RESPONSES TO PHQ9 QUESTIONS 1 AND 2: 0
2. FEELING DOWN, DEPRESSED OR HOPELESS: NOT AT ALL
1. LITTLE INTEREST OR PLEASURE IN DOING THINGS: NOT AT ALL

## 2024-08-01 NOTE — PROGRESS NOTES
Assessment     ASSESSMENT/PLAN:      Problem List Items Addressed This Visit       Atherosclerosis of aorta (CMS-HCC)    Essential hypertension    Relevant Medications    amLODIPine (Norvasc) 10 mg tablet    isosorbide mononitrate ER (Imdur) 60 mg 24 hr tablet    Major depressive disorder    Relevant Medications    sertraline (Zoloft) 100 mg tablet    Mixed hyperlipidemia    Relevant Medications    atorvastatin (Lipitor) 40 mg tablet    Other Relevant Orders    Lipid Panel    Type 2 diabetes mellitus with diabetic nephropathy, without long-term current use of insulin (Multi)    Relevant Medications    pioglitazone (Actos) 15 mg tablet    metFORMIN (Glucophage) 1,000 mg tablet    Other Relevant Orders    Hemoglobin A1C    Basic Metabolic Panel    Cigarette nicotine dependence with nicotine-induced disorder - Primary    Relevant Orders    CT lung screening follow up CT chest wo IV contrast     Other Visit Diagnoses       Routine general medical examination at a health care facility        Type 2 diabetes mellitus without complication, without long-term current use of insulin (Multi)        Relevant Medications    pioglitazone (Actos) 15 mg tablet    metFORMIN (Glucophage) 1,000 mg tablet    Other Relevant Orders    Basic Metabolic Panel    Type 2 diabetes mellitus with hyperglycemia, unspecified whether long term insulin use (Multi)        Relevant Medications    glimepiride (Amaryl) 4 mg tablet    Other Relevant Orders    Hemoglobin A1C    Basic Metabolic Panel    Osteoporosis screening        Relevant Orders    XR DEXA bone density    Encounter for osteoporosis screening in asymptomatic postmenopausal patient        Relevant Orders    XR DEXA bone density            Patient Instructions:  Patient Instructions   T2DM: plan to stop victoza and switching to trulicity  Strongly encouraged to try physical therapy exercises to help improve strength, will order DEXA scan to evaluate for osteoporosis  Patient does have  "history of tobacco use, she was counseled however she does not plan to quit at this time.  Previous CT from 2019 was done will repeat to evaluate for lung cancer      Signed by: Ar Jimenez DO       FUTURE DIRECTION:   Review A1c    Subjective   SUBJECTIVE:     HPI : Patient is a 79 y.o. female who presents today for the following:     T2DM  Glimepiride 4 mg bid, Metformin 1000 mg, Victoza 1.8mcg   A1c imprved to 8.4  Plan to switch to trulicity      HTN/CAD  Carvedilol 6.25 mg BID, Imdur 60 mg, amlodipine 10 mg, baby aspirin      HLD  Atorvastatin 40 mg    Depression/anxiety  Zoloft 100 mg    COPD  Ventolin prn  Uses stiolto     Nicotine use  Tobacco use, not ready to quit     Osteoporosis  Takes vitamin D and calcium supplement    Social: goes to [place]     Care Team   PCP: Ar Jimenez DO  Pulmo: Brian NP        Review of Systems   Neurological:  Positive for dizziness.       History reviewed. No pertinent past medical history.     History reviewed. No pertinent surgical history.     Current Outpatient Medications   Medication Instructions    Accu-Chek Meghan Plus test strp strip Accu-Chek Meghan Plus In Vitro Strip   Quantity: 200  Refills: 0        Start : 2-Jun-2017   Active    amLODIPine (NORVASC) 10 mg, oral, Daily    aspirin 81 mg, oral, Daily    atorvastatin (LIPITOR) 40 mg, oral, Daily    carvedilol (COREG) 6.25 mg, oral, 2 times daily (morning and late afternoon)    cyclobenzaprine (FLEXERIL) 5 mg, oral, Nightly PRN    glimepiride (AMARYL) 4 mg, oral, 2 times daily    IPRATROPIUM BROMIDE NASL nasal    isosorbide mononitrate ER (IMDUR) 60 mg, oral, Daily    loratadine (CLARITIN) 10 mg, oral, Daily    metFORMIN (GLUCOPHAGE) 1,000 mg, oral, 2 times daily (morning and late afternoon)    omeprazole (PRILOSEC) 40 mg, oral, Do not crush or chew.    pen needle, diabetic (NovoTwist) 32 gauge x 1/5\" needle miscellaneous    pioglitazone (ACTOS) 15 mg, oral, Daily    semaglutide (RYBELSUS) 3 mg, " "oral, Daily    sertraline (ZOLOFT) 100 mg, oral, Daily    tiotropium-olodateroL (Stiolto Respimat) 2.5-2.5 mcg/actuation mist inhaler 2 Inhalations, inhalation, Daily    Trulicity 0.75 mg, subcutaneous, Once Weekly    Ventolin HFA 90 mcg/actuation inhaler 2 puffs, inhalation, Every 4 hours PRN        Allergies   Allergen Reactions    Omeprazole Hives     hives    Ace Inhibitors Hives    Bupropion Hives    Bupropion Hcl Hives     hives    Codeine Other    Penicillins Other    Sulfamethoxazole-Trimethoprim Hives        Social History     Socioeconomic History    Marital status:      Spouse name: Not on file    Number of children: Not on file    Years of education: Not on file    Highest education level: Not on file   Occupational History    Not on file   Tobacco Use    Smoking status: Every Day     Current packs/day: 0.75     Average packs/day: 0.8 packs/day for 60.6 years (45.4 ttl pk-yrs)     Types: Cigarettes     Start date: 1964    Smokeless tobacco: Never   Substance and Sexual Activity    Alcohol use: Never    Drug use: Never    Sexual activity: Not on file   Other Topics Concern    Not on file   Social History Narrative    Not on file     Social Determinants of Health     Financial Resource Strain: Not on file   Food Insecurity: Not on file   Transportation Needs: Not on file   Physical Activity: Not on file   Stress: Not on file   Social Connections: Not on file   Intimate Partner Violence: Not on file   Housing Stability: Not on file        No family history on file.     Objective     OBJECTIVE:     Vitals:    08/01/24 1053   BP: 122/70   Pulse: 86   Temp: 36.6 °C (97.9 °F)   SpO2: 98%   Weight: 54.4 kg (120 lb)   Height: 1.473 m (4' 10\")        Physical Exam  HENT:      Head: Normocephalic and atraumatic.      Nose: Nose normal.      Mouth/Throat:      Mouth: Mucous membranes are moist.   Eyes:      Pupils: Pupils are equal, round, and reactive to light.   Cardiovascular:      Rate and Rhythm: Normal " rate and regular rhythm.      Pulses: Normal pulses.      Heart sounds: No murmur heard.  Pulmonary:      Effort: Pulmonary effort is normal.      Breath sounds: Normal breath sounds.   Abdominal:      Tenderness: There is no abdominal tenderness.   Musculoskeletal:         General: Normal range of motion.      Cervical back: Normal range of motion.   Skin:     General: Skin is warm and dry.   Neurological:      Mental Status: She is alert.   Psychiatric:         Mood and Affect: Mood normal.       The 10-year ASCVD risk score (Michael ESPINOZA, et al., 2019) is: 58.5%    Values used to calculate the score:      Age: 79 years      Sex: Female      Is Non- : No      Diabetic: Yes      Tobacco smoker: Yes      Systolic Blood Pressure: 122 mmHg      Is BP treated: Yes      HDL Cholesterol: 47.2 mg/dL      Total Cholesterol: 140 mg/dL    Cardiovascular risk discussed and, if needed, lifestyle modifications recommended, including nutritional choices, exercise, and elimination of habits contributing to risk.  We agreed on a plan to reduce her current cardiovascular risk.  See the ASCVD risk  +4 data discussed regarding risk and risk reduction opportunities.  Aspirin use/disuse was discussed after reviewing updated guidelines. Spent 15 minutes

## 2024-08-01 NOTE — PATIENT INSTRUCTIONS
T2DM: plan to stop victoza and switching to trulicity  Strongly encouraged to try physical therapy exercises to help improve strength, will order DEXA scan to evaluate for osteoporosis  Patient does have history of tobacco use, she was counseled however she does not plan to quit at this time.  Previous CT from 2019 was done will repeat to evaluate for lung cancer

## 2024-08-15 NOTE — PROGRESS NOTES
Pharmacist Clinic: Diabetes Management  Funmilayo Montenegro is a 79 y.o. female was referred to Clinical Pharmacy Team for diabetes management.   Referring Provider: Ar Jimenez, *  - Last visit with referring provider: 8/1/24     Patient Assistance for Trulicity and Stiolto approved through 7/29/25. Will have to be renewed prior to that date to prevent lapse in coverage. Medication(s) will be received at no cost to patient from [facility].       Subjective     HPI    Patient was supposed to start Trulicity last appointment but wanted to use up her Victoza first. She has 3 days left. Therefore, has not started Trulicity    Current Diabetes Pharmacotherapy:    - Glimepiride 4 mg BID AC  - Victoza 1.8 mg daily  - Metformin 1000 mg BID  - Pioglitazone 15 mg daily    Social History:  Current diet:   - Breakfast  - Lunch: grabs something on the go  - Dinner: Sometimes eats 2-3 bites and gets full   - Dinner: Chicken, roast beef, pork, veggies  - Drinks a lot of coffee   - Sugar free foods/drinks     Current exercise:   - None  - Has cane/walker due to back issues    Eye exam for this year?: No  Foot exam for this year?:  No    Current monitoring regimen:   Patient is using: glucometer  Type of CGM: N/A    Patient is testing blood sugars 2 times a day. Hasn't in last 1-2 days    Reported blood sugars:     2 hours after breakfast:   - 200, 210, 195    Any episodes of hypoglycemia? None    Adverse Effects:   - None    Objective     There were no vitals taken for this visit.    Allergies   Allergen Reactions    Omeprazole Hives     hives    Ace Inhibitors Hives    Bupropion Hives    Bupropion Hcl Hives     hives    Codeine Other    Penicillins Other    Sulfamethoxazole-Trimethoprim Hives       Historical Diabetes Pharmacotherapy:  - Rybelsus (in replacement of Victoza)    SECONDARY PREVENTION  - Statin? Yes   - ACE-I/ARB? No  - Aspirin? Yes    Pertinent PMH Review:  - PMH of Pancreatitis: No  - PMH of  Retinopathy: No  - PMH of Urinary Tract Infections: No  - PMH of Yeast Infections: No  - PMH of MTC: No    Lab Review  Lab Results   Component Value Date    BILITOT 0.5 02/04/2023    CALCIUM 9.6 07/27/2024    CO2 31 07/27/2024     07/27/2024    CREATININE 0.57 07/27/2024    GLUCOSE 162 (H) 07/27/2024    ALKPHOS 52 02/04/2023    K 4.4 07/27/2024    PROT 7.6 02/04/2023     07/27/2024    AST 13 02/04/2023    ALT 22 02/04/2023    BUN 11 07/27/2024    ANIONGAP 10 07/27/2024     07/14/2022    ALBUMIN 4.3 02/04/2023    GFRF >90 07/29/2023     Lab Results   Component Value Date    TRIG 158 (H) 07/27/2024    CHOL 140 07/27/2024    HDL 47.2 07/27/2024     Lab Results   Component Value Date    HGBA1C 8.4 (H) 07/27/2024    HGBA1C 9.0 (H) 02/24/2024    HGBA1C 9.4 (A) 07/29/2023     The 10-year ASCVD risk score (Michael ESPINOZA, et al., 2019) is: 58.5%    Values used to calculate the score:      Age: 79 years      Sex: Female      Is Non- : No      Diabetic: Yes      Tobacco smoker: Yes      Systolic Blood Pressure: 122 mmHg      Is BP treated: Yes      HDL Cholesterol: 47.2 mg/dL      Total Cholesterol: 140 mg/dL    Drug Interactions:  None    Affordability/Accessibility:  - Household: (5)  - Files taxes  - < $61,000     Preferred Pharmacy:  - [facility] ([place], [place])    Assessment/Plan   Problem List Items Addressed This Visit    None  Visit Diagnoses       Type 2 diabetes mellitus without complication, without long-term current use of insulin (Multi)        Relevant Orders    Follow Up In Clinical Pharmacy            ASSESSMENT:  Patients diabetes is uncontrolled with most recent A1c of 8.4%. (Goal is < 8% due to age + other co morbidities)    Last appointment, patient was prescribed Trulicity which was approved through PAP. She tells me today that she has not started it because she wanted to use up her Victoza. She has 3 days left of it then will start Trulicity. We will follow up in a  month to see how she is doing with the Trulicity.      PLAN:  Finish 3 days of Victoza then START Trulicity 0.75 mg weekly  Follow up with clinical pharmacist: 9/16/24 @ 9:30   Follow up with PCP: Has not set yet    Thank you,  Imani Borja, PharmD  Clinical Pharmacy Specialist  [phone]  [email or web address]     Continue all meds under the continuation of care with the referring provider and clinical pharmacy team.

## 2024-08-16 ENCOUNTER — APPOINTMENT (OUTPATIENT)
Dept: PHARMACY | Facility: HOSPITAL | Age: 79
End: 2024-08-16
Payer: MEDICARE

## 2024-08-16 DIAGNOSIS — E11.9 TYPE 2 DIABETES MELLITUS WITHOUT COMPLICATION, WITHOUT LONG-TERM CURRENT USE OF INSULIN (MULTI): ICD-10-CM

## 2024-08-22 PROCEDURE — RXMED WILLOW AMBULATORY MEDICATION CHARGE

## 2024-08-23 ENCOUNTER — PHARMACY VISIT (OUTPATIENT)
Dept: PHARMACY | Facility: CLINIC | Age: 79
End: 2024-08-23
Payer: COMMERCIAL

## 2024-08-24 PROCEDURE — RXMED WILLOW AMBULATORY MEDICATION CHARGE

## 2024-08-28 ENCOUNTER — PHARMACY VISIT (OUTPATIENT)
Dept: PHARMACY | Facility: CLINIC | Age: 79
End: 2024-08-28
Payer: COMMERCIAL

## 2024-09-03 DIAGNOSIS — I10 ESSENTIAL HYPERTENSION: ICD-10-CM

## 2024-09-03 RX ORDER — CARVEDILOL 6.25 MG/1
6.25 TABLET ORAL
Qty: 180 TABLET | Refills: 0 | Status: SHIPPED | OUTPATIENT
Start: 2024-09-03 | End: 2025-09-03

## 2024-09-03 RX ORDER — CARVEDILOL 6.25 MG/1
6.25 TABLET ORAL
Qty: 180 TABLET | Refills: 3 | Status: SHIPPED | OUTPATIENT
Start: 2024-09-03 | End: 2024-09-03

## 2024-09-03 NOTE — TELEPHONE ENCOUNTER
Pt states never received Rx from [facility] for the Carvedilol and pt is almost out.  Pt would like refill sent to [facility]

## 2024-09-04 ENCOUNTER — HOSPITAL ENCOUNTER (OUTPATIENT)
Dept: RADIOLOGY | Facility: CLINIC | Age: 79
Discharge: HOME | End: 2024-09-04
Payer: MEDICARE

## 2024-09-04 DIAGNOSIS — Z13.820 OSTEOPOROSIS SCREENING: ICD-10-CM

## 2024-09-04 DIAGNOSIS — Z13.820 ENCOUNTER FOR OSTEOPOROSIS SCREENING IN ASYMPTOMATIC POSTMENOPAUSAL PATIENT: ICD-10-CM

## 2024-09-04 DIAGNOSIS — Z78.0 ENCOUNTER FOR OSTEOPOROSIS SCREENING IN ASYMPTOMATIC POSTMENOPAUSAL PATIENT: ICD-10-CM

## 2024-09-04 PROCEDURE — 77080 DXA BONE DENSITY AXIAL: CPT

## 2024-09-04 ASSESSMENT — LIFESTYLE VARIABLES
3_OR_MORE_DRINKS_PER_DAY: N
CURRENT_SMOKER: Y

## 2024-09-16 ENCOUNTER — TELEPHONE (OUTPATIENT)
Dept: PHARMACY | Facility: HOSPITAL | Age: 79
End: 2024-09-16

## 2024-09-16 ENCOUNTER — APPOINTMENT (OUTPATIENT)
Dept: PHARMACY | Facility: HOSPITAL | Age: 79
End: 2024-09-16
Payer: MEDICARE

## 2024-09-16 NOTE — TELEPHONE ENCOUNTER
Patient was seeing Dr. Burrell who is no longer at the practice. I explained to patient in order for me to continue with her diabetes management, she must see an alternate  PCP under my CPA. Any of the PCP's at the [place] are under my CPA, just need to send in a new referral. Patient verbalizes understanding and will more than likely find an alternate PCP at the [place] location. Will cancel today's appointment and referral until new one is sent.    Imani Borja, PharmD  Clinical Pharmacy Specialist - Primary Care  [phone]  [email or web address]

## 2024-09-17 ENCOUNTER — TELEPHONE (OUTPATIENT)
Dept: PRIMARY CARE | Facility: CLINIC | Age: 79
End: 2024-09-17
Payer: MEDICARE

## 2024-09-17 NOTE — TELEPHONE ENCOUNTER
Please let patient know that I reviewed her bone density test since Dr. MACIAS is no longer in our office. Her bone density test showed osteoporosis. I would recommend discussing treatment options with her new provider. Looks like she is not schedule with anyone yet; ok to wait for new doctor in November or establish with another provider.

## 2024-09-19 DIAGNOSIS — E11.9 TYPE 2 DIABETES MELLITUS WITHOUT COMPLICATION, WITHOUT LONG-TERM CURRENT USE OF INSULIN (MULTI): ICD-10-CM

## 2024-09-20 RX ORDER — DULAGLUTIDE 0.75 MG/.5ML
0.75 INJECTION, SOLUTION SUBCUTANEOUS
Qty: 2 ML | Refills: 1 | Status: SHIPPED | OUTPATIENT
Start: 2024-09-22

## 2024-09-21 ENCOUNTER — PHARMACY VISIT (OUTPATIENT)
Dept: PHARMACY | Facility: CLINIC | Age: 79
End: 2024-09-21
Payer: COMMERCIAL

## 2024-09-21 PROCEDURE — RXMED WILLOW AMBULATORY MEDICATION CHARGE

## 2024-09-23 PROCEDURE — RXMED WILLOW AMBULATORY MEDICATION CHARGE

## 2024-09-24 ENCOUNTER — PHARMACY VISIT (OUTPATIENT)
Dept: PHARMACY | Facility: CLINIC | Age: 79
End: 2024-09-24
Payer: COMMERCIAL

## 2024-10-04 ENCOUNTER — APPOINTMENT (OUTPATIENT)
Dept: CARDIOLOGY | Facility: HOSPITAL | Age: 79
DRG: 536 | End: 2024-10-04
Payer: MEDICARE

## 2024-10-04 ENCOUNTER — APPOINTMENT (OUTPATIENT)
Dept: RADIOLOGY | Facility: HOSPITAL | Age: 79
DRG: 536 | End: 2024-10-04
Payer: MEDICARE

## 2024-10-04 ENCOUNTER — HOSPITAL ENCOUNTER (INPATIENT)
Facility: HOSPITAL | Age: 79
End: 2024-10-04
Attending: EMERGENCY MEDICINE | Admitting: INTERNAL MEDICINE
Payer: MEDICARE

## 2024-10-04 DIAGNOSIS — F17.219 CIGARETTE NICOTINE DEPENDENCE WITH NICOTINE-INDUCED DISORDER: ICD-10-CM

## 2024-10-04 DIAGNOSIS — E11.9 TYPE 2 DIABETES MELLITUS WITHOUT COMPLICATION, WITHOUT LONG-TERM CURRENT USE OF INSULIN (MULTI): ICD-10-CM

## 2024-10-04 DIAGNOSIS — E11.21 TYPE 2 DIABETES MELLITUS WITH DIABETIC NEPHROPATHY, WITHOUT LONG-TERM CURRENT USE OF INSULIN: ICD-10-CM

## 2024-10-04 DIAGNOSIS — S32.810A PELVIC RING FRACTURE, CLOSED, INITIAL ENCOUNTER (MULTI): Primary | ICD-10-CM

## 2024-10-04 PROBLEM — Z72.0 TOBACCO ABUSE DISORDER: Status: RESOLVED | Noted: 2024-10-04 | Resolved: 2024-10-04

## 2024-10-04 PROBLEM — Z72.0 TOBACCO ABUSE DISORDER: Status: ACTIVE | Noted: 2024-10-04

## 2024-10-04 LAB
ABO GROUP (TYPE) IN BLOOD: NORMAL
ALBUMIN SERPL BCP-MCNC: 4.5 G/DL (ref 3.4–5)
ALP SERPL-CCNC: 61 U/L (ref 33–136)
ALT SERPL W P-5'-P-CCNC: 17 U/L (ref 7–45)
ANION GAP SERPL CALC-SCNC: 14 MMOL/L (ref 10–20)
ANTIBODY SCREEN: NORMAL
APPEARANCE UR: CLEAR
APTT PPP: 36 SECONDS (ref 27–38)
AST SERPL W P-5'-P-CCNC: 13 U/L (ref 9–39)
BASOPHILS # BLD AUTO: 0.05 X10*3/UL (ref 0–0.1)
BASOPHILS NFR BLD AUTO: 0.3 %
BILIRUB SERPL-MCNC: 0.7 MG/DL (ref 0–1.2)
BILIRUB UR STRIP.AUTO-MCNC: NEGATIVE MG/DL
BNP SERPL-MCNC: 42 PG/ML (ref 0–99)
BUN SERPL-MCNC: 17 MG/DL (ref 6–23)
CALCIUM SERPL-MCNC: 9.9 MG/DL (ref 8.6–10.3)
CHLORIDE SERPL-SCNC: 98 MMOL/L (ref 98–107)
CK SERPL-CCNC: 150 U/L (ref 0–215)
CO2 SERPL-SCNC: 27 MMOL/L (ref 21–32)
COLOR UR: COLORLESS
CREAT SERPL-MCNC: 0.7 MG/DL (ref 0.5–1.05)
EGFRCR SERPLBLD CKD-EPI 2021: 88 ML/MIN/1.73M*2
EOSINOPHIL # BLD AUTO: 0.01 X10*3/UL (ref 0–0.4)
EOSINOPHIL NFR BLD AUTO: 0.1 %
ERYTHROCYTE [DISTWIDTH] IN BLOOD BY AUTOMATED COUNT: 16.3 % (ref 11.5–14.5)
GLUCOSE BLD MANUAL STRIP-MCNC: 279 MG/DL (ref 74–99)
GLUCOSE BLD MANUAL STRIP-MCNC: 364 MG/DL (ref 74–99)
GLUCOSE BLD MANUAL STRIP-MCNC: 443 MG/DL (ref 74–99)
GLUCOSE SERPL-MCNC: 380 MG/DL (ref 74–99)
GLUCOSE UR STRIP.AUTO-MCNC: ABNORMAL MG/DL
HCT VFR BLD AUTO: 42.8 % (ref 36–46)
HGB BLD-MCNC: 14.2 G/DL (ref 12–16)
HOLD SPECIMEN: NORMAL
IMM GRANULOCYTES # BLD AUTO: 0.31 X10*3/UL (ref 0–0.5)
IMM GRANULOCYTES NFR BLD AUTO: 1.7 % (ref 0–0.9)
INR PPP: 1.1 (ref 0.9–1.1)
KETONES UR STRIP.AUTO-MCNC: ABNORMAL MG/DL
LEUKOCYTE ESTERASE UR QL STRIP.AUTO: NEGATIVE
LYMPHOCYTES # BLD AUTO: 0.95 X10*3/UL (ref 0.8–3)
LYMPHOCYTES NFR BLD AUTO: 5.3 %
MCH RBC QN AUTO: 29 PG (ref 26–34)
MCHC RBC AUTO-ENTMCNC: 33.2 G/DL (ref 32–36)
MCV RBC AUTO: 88 FL (ref 80–100)
MONOCYTES # BLD AUTO: 0.8 X10*3/UL (ref 0.05–0.8)
MONOCYTES NFR BLD AUTO: 4.5 %
NEUTROPHILS # BLD AUTO: 15.84 X10*3/UL (ref 1.6–5.5)
NEUTROPHILS NFR BLD AUTO: 88.1 %
NITRITE UR QL STRIP.AUTO: NEGATIVE
NRBC BLD-RTO: 0 /100 WBCS (ref 0–0)
PH UR STRIP.AUTO: 6 [PH]
PLATELET # BLD AUTO: 291 X10*3/UL (ref 150–450)
POTASSIUM SERPL-SCNC: 3.9 MMOL/L (ref 3.5–5.3)
PROT SERPL-MCNC: 7.9 G/DL (ref 6.4–8.2)
PROT UR STRIP.AUTO-MCNC: ABNORMAL MG/DL
PROTHROMBIN TIME: 11.9 SECONDS (ref 9.8–12.8)
RBC # BLD AUTO: 4.89 X10*6/UL (ref 4–5.2)
RBC # UR STRIP.AUTO: ABNORMAL /UL
RBC #/AREA URNS AUTO: NORMAL /HPF
RH FACTOR (ANTIGEN D): NORMAL
SODIUM SERPL-SCNC: 135 MMOL/L (ref 136–145)
SP GR UR STRIP.AUTO: 1.01
UROBILINOGEN UR STRIP.AUTO-MCNC: NORMAL MG/DL
WBC # BLD AUTO: 18 X10*3/UL (ref 4.4–11.3)
WBC #/AREA URNS AUTO: NORMAL /HPF

## 2024-10-04 PROCEDURE — 85610 PROTHROMBIN TIME: CPT | Performed by: EMERGENCY MEDICINE

## 2024-10-04 PROCEDURE — 36415 COLL VENOUS BLD VENIPUNCTURE: CPT | Performed by: EMERGENCY MEDICINE

## 2024-10-04 PROCEDURE — 71045 X-RAY EXAM CHEST 1 VIEW: CPT

## 2024-10-04 PROCEDURE — 94640 AIRWAY INHALATION TREATMENT: CPT

## 2024-10-04 PROCEDURE — 84075 ASSAY ALKALINE PHOSPHATASE: CPT | Performed by: EMERGENCY MEDICINE

## 2024-10-04 PROCEDURE — 73502 X-RAY EXAM HIP UNI 2-3 VIEWS: CPT | Mod: LEFT SIDE | Performed by: RADIOLOGY

## 2024-10-04 PROCEDURE — 99223 1ST HOSP IP/OBS HIGH 75: CPT | Performed by: NURSE PRACTITIONER

## 2024-10-04 PROCEDURE — S4991 NICOTINE PATCH NONLEGEND: HCPCS | Performed by: NURSE PRACTITIONER

## 2024-10-04 PROCEDURE — 96376 TX/PRO/DX INJ SAME DRUG ADON: CPT

## 2024-10-04 PROCEDURE — 96375 TX/PRO/DX INJ NEW DRUG ADDON: CPT

## 2024-10-04 PROCEDURE — 70450 CT HEAD/BRAIN W/O DYE: CPT | Performed by: RADIOLOGY

## 2024-10-04 PROCEDURE — 2500000004 HC RX 250 GENERAL PHARMACY W/ HCPCS (ALT 636 FOR OP/ED): Performed by: NURSE PRACTITIONER

## 2024-10-04 PROCEDURE — 86901 BLOOD TYPING SEROLOGIC RH(D): CPT | Performed by: EMERGENCY MEDICINE

## 2024-10-04 PROCEDURE — 82947 ASSAY GLUCOSE BLOOD QUANT: CPT

## 2024-10-04 PROCEDURE — 85730 THROMBOPLASTIN TIME PARTIAL: CPT | Performed by: EMERGENCY MEDICINE

## 2024-10-04 PROCEDURE — 1200000002 HC GENERAL ROOM WITH TELEMETRY DAILY

## 2024-10-04 PROCEDURE — 2500000002 HC RX 250 W HCPCS SELF ADMINISTERED DRUGS (ALT 637 FOR MEDICARE OP, ALT 636 FOR OP/ED): Performed by: NURSE PRACTITIONER

## 2024-10-04 PROCEDURE — 2500000001 HC RX 250 WO HCPCS SELF ADMINISTERED DRUGS (ALT 637 FOR MEDICARE OP): Performed by: NURSE PRACTITIONER

## 2024-10-04 PROCEDURE — 72192 CT PELVIS W/O DYE: CPT | Performed by: RADIOLOGY

## 2024-10-04 PROCEDURE — 72125 CT NECK SPINE W/O DYE: CPT

## 2024-10-04 PROCEDURE — 81001 URINALYSIS AUTO W/SCOPE: CPT | Performed by: EMERGENCY MEDICINE

## 2024-10-04 PROCEDURE — 71045 X-RAY EXAM CHEST 1 VIEW: CPT | Performed by: RADIOLOGY

## 2024-10-04 PROCEDURE — 81003 URINALYSIS AUTO W/O SCOPE: CPT | Performed by: EMERGENCY MEDICINE

## 2024-10-04 PROCEDURE — 96374 THER/PROPH/DIAG INJ IV PUSH: CPT

## 2024-10-04 PROCEDURE — 85025 COMPLETE CBC W/AUTO DIFF WBC: CPT | Performed by: EMERGENCY MEDICINE

## 2024-10-04 PROCEDURE — 99223 1ST HOSP IP/OBS HIGH 75: CPT | Performed by: STUDENT IN AN ORGANIZED HEALTH CARE EDUCATION/TRAINING PROGRAM

## 2024-10-04 PROCEDURE — 70450 CT HEAD/BRAIN W/O DYE: CPT

## 2024-10-04 PROCEDURE — 72125 CT NECK SPINE W/O DYE: CPT | Performed by: RADIOLOGY

## 2024-10-04 PROCEDURE — 2500000004 HC RX 250 GENERAL PHARMACY W/ HCPCS (ALT 636 FOR OP/ED): Performed by: EMERGENCY MEDICINE

## 2024-10-04 PROCEDURE — 72192 CT PELVIS W/O DYE: CPT

## 2024-10-04 PROCEDURE — 82550 ASSAY OF CK (CPK): CPT | Performed by: EMERGENCY MEDICINE

## 2024-10-04 PROCEDURE — 93005 ELECTROCARDIOGRAM TRACING: CPT

## 2024-10-04 PROCEDURE — 99285 EMERGENCY DEPT VISIT HI MDM: CPT | Mod: 25

## 2024-10-04 PROCEDURE — 73502 X-RAY EXAM HIP UNI 2-3 VIEWS: CPT | Mod: LT

## 2024-10-04 PROCEDURE — 83880 ASSAY OF NATRIURETIC PEPTIDE: CPT | Performed by: NURSE PRACTITIONER

## 2024-10-04 PROCEDURE — 86900 BLOOD TYPING SEROLOGIC ABO: CPT | Performed by: EMERGENCY MEDICINE

## 2024-10-04 RX ORDER — INSULIN LISPRO 100 [IU]/ML
0-10 INJECTION, SOLUTION INTRAVENOUS; SUBCUTANEOUS
Status: DISPENSED | OUTPATIENT
Start: 2024-10-04

## 2024-10-04 RX ORDER — IBUPROFEN 200 MG
1 TABLET ORAL DAILY
Status: DISPENSED | OUTPATIENT
Start: 2024-10-04 | End: 2024-11-15

## 2024-10-04 RX ORDER — MORPHINE SULFATE 4 MG/ML
4 INJECTION INTRAVENOUS ONCE
Status: COMPLETED | OUTPATIENT
Start: 2024-10-04 | End: 2024-10-04

## 2024-10-04 RX ORDER — IPRATROPIUM BROMIDE AND ALBUTEROL SULFATE 2.5; .5 MG/3ML; MG/3ML
3 SOLUTION RESPIRATORY (INHALATION) 4 TIMES DAILY PRN
Status: ACTIVE | OUTPATIENT
Start: 2024-10-04

## 2024-10-04 RX ORDER — ONDANSETRON HYDROCHLORIDE 2 MG/ML
4 INJECTION, SOLUTION INTRAVENOUS EVERY 8 HOURS PRN
Status: ACTIVE | OUTPATIENT
Start: 2024-10-04

## 2024-10-04 RX ORDER — MORPHINE SULFATE 4 MG/ML
4 INJECTION INTRAVENOUS EVERY 4 HOURS PRN
Status: ACTIVE | OUTPATIENT
Start: 2024-10-04

## 2024-10-04 RX ORDER — ASPIRIN 81 MG/1
81 TABLET ORAL DAILY
Status: DISPENSED | OUTPATIENT
Start: 2024-10-04

## 2024-10-04 RX ORDER — INSULIN LISPRO 100 [IU]/ML
0-10 INJECTION, SOLUTION INTRAVENOUS; SUBCUTANEOUS
Status: DISCONTINUED | OUTPATIENT
Start: 2024-10-04 | End: 2024-10-04

## 2024-10-04 RX ORDER — BISACODYL 10 MG/1
10 SUPPOSITORY RECTAL DAILY PRN
Status: ACTIVE | OUTPATIENT
Start: 2024-10-04

## 2024-10-04 RX ORDER — CARVEDILOL 6.25 MG/1
6.25 TABLET ORAL
Status: DISPENSED | OUTPATIENT
Start: 2024-10-04

## 2024-10-04 RX ORDER — ISOSORBIDE MONONITRATE 60 MG/1
60 TABLET, EXTENDED RELEASE ORAL DAILY
Status: DISPENSED | OUTPATIENT
Start: 2024-10-04

## 2024-10-04 RX ORDER — GUAIFENESIN 600 MG/1
600 TABLET, EXTENDED RELEASE ORAL EVERY 12 HOURS PRN
Status: ACTIVE | OUTPATIENT
Start: 2024-10-04

## 2024-10-04 RX ORDER — POLYETHYLENE GLYCOL 3350 17 G/17G
17 POWDER, FOR SOLUTION ORAL DAILY
Status: DISPENSED | OUTPATIENT
Start: 2024-10-04

## 2024-10-04 RX ORDER — DEXTROSE 50 % IN WATER (D50W) INTRAVENOUS SYRINGE
12.5
Status: ACTIVE | OUTPATIENT
Start: 2024-10-04

## 2024-10-04 RX ORDER — ONDANSETRON 4 MG/1
4 TABLET, ORALLY DISINTEGRATING ORAL EVERY 8 HOURS PRN
Status: ACTIVE | OUTPATIENT
Start: 2024-10-04

## 2024-10-04 RX ORDER — FORMOTEROL FUMARATE DIHYDRATE 20 UG/2ML
20 SOLUTION RESPIRATORY (INHALATION)
Status: ACTIVE | OUTPATIENT
Start: 2024-10-04

## 2024-10-04 RX ORDER — AMLODIPINE BESYLATE 10 MG/1
10 TABLET ORAL DAILY
Status: DISPENSED | OUTPATIENT
Start: 2024-10-04

## 2024-10-04 RX ORDER — ALBUTEROL SULFATE 90 UG/1
2 INHALANT RESPIRATORY (INHALATION) EVERY 4 HOURS PRN
Status: ACTIVE | OUTPATIENT
Start: 2024-10-04

## 2024-10-04 RX ORDER — ENOXAPARIN SODIUM 100 MG/ML
40 INJECTION SUBCUTANEOUS EVERY 24 HOURS
Status: DISPENSED | OUTPATIENT
Start: 2024-10-04

## 2024-10-04 RX ORDER — DEXTROSE 50 % IN WATER (D50W) INTRAVENOUS SYRINGE
25
Status: ACTIVE | OUTPATIENT
Start: 2024-10-04

## 2024-10-04 RX ORDER — TALC
3 POWDER (GRAM) TOPICAL NIGHTLY PRN
Status: ACTIVE | OUTPATIENT
Start: 2024-10-04

## 2024-10-04 RX ORDER — INSULIN LISPRO 100 [IU]/ML
10 INJECTION, SOLUTION INTRAVENOUS; SUBCUTANEOUS ONCE
Status: COMPLETED | OUTPATIENT
Start: 2024-10-04 | End: 2024-10-04

## 2024-10-04 RX ORDER — SERTRALINE HYDROCHLORIDE 100 MG/1
100 TABLET, FILM COATED ORAL DAILY
Status: DISPENSED | OUTPATIENT
Start: 2024-10-04

## 2024-10-04 RX ORDER — OXYCODONE HYDROCHLORIDE 5 MG/1
5 TABLET ORAL EVERY 4 HOURS PRN
Status: DISPENSED | OUTPATIENT
Start: 2024-10-04

## 2024-10-04 RX ORDER — NICOTINE 7MG/24HR
1 PATCH, TRANSDERMAL 24 HOURS TRANSDERMAL DAILY
Status: ACTIVE | OUTPATIENT
Start: 2024-11-15 | End: 2024-11-29

## 2024-10-04 RX ORDER — ATORVASTATIN CALCIUM 40 MG/1
40 TABLET, FILM COATED ORAL DAILY
Status: DISPENSED | OUTPATIENT
Start: 2024-10-04

## 2024-10-04 RX ORDER — BISACODYL 5 MG
10 TABLET, DELAYED RELEASE (ENTERIC COATED) ORAL DAILY PRN
Status: ACTIVE | OUTPATIENT
Start: 2024-10-04

## 2024-10-04 RX ORDER — ONDANSETRON HYDROCHLORIDE 2 MG/ML
4 INJECTION, SOLUTION INTRAVENOUS ONCE
Status: COMPLETED | OUTPATIENT
Start: 2024-10-04 | End: 2024-10-04

## 2024-10-04 SDOH — SOCIAL STABILITY: SOCIAL INSECURITY: ARE THERE ANY APPARENT SIGNS OF INJURIES/BEHAVIORS THAT COULD BE RELATED TO ABUSE/NEGLECT?: NO

## 2024-10-04 SDOH — ECONOMIC STABILITY: INCOME INSECURITY: IN THE PAST 12 MONTHS, HAS THE ELECTRIC, GAS, OIL, OR WATER COMPANY THREATENED TO SHUT OFF SERVICE IN YOUR HOME?: NO

## 2024-10-04 SDOH — ECONOMIC STABILITY: HOUSING INSECURITY: IN THE PAST 12 MONTHS, HOW MANY TIMES HAVE YOU MOVED WHERE YOU WERE LIVING?: 0

## 2024-10-04 SDOH — SOCIAL STABILITY: SOCIAL INSECURITY: DOES ANYONE TRY TO KEEP YOU FROM HAVING/CONTACTING OTHER FRIENDS OR DOING THINGS OUTSIDE YOUR HOME?: NO

## 2024-10-04 SDOH — ECONOMIC STABILITY: HOUSING INSECURITY: AT ANY TIME IN THE PAST 12 MONTHS, WERE YOU HOMELESS OR LIVING IN A SHELTER (INCLUDING NOW)?: NO

## 2024-10-04 SDOH — SOCIAL STABILITY: SOCIAL INSECURITY: HAS ANYONE EVER THREATENED TO HURT YOUR FAMILY OR YOUR PETS?: NO

## 2024-10-04 SDOH — SOCIAL STABILITY: SOCIAL INSECURITY: DO YOU FEEL ANYONE HAS EXPLOITED OR TAKEN ADVANTAGE OF YOU FINANCIALLY OR OF YOUR PERSONAL PROPERTY?: NO

## 2024-10-04 SDOH — SOCIAL STABILITY: SOCIAL INSECURITY: WITHIN THE LAST YEAR, HAVE YOU BEEN AFRAID OF YOUR PARTNER OR EX-PARTNER?: NO

## 2024-10-04 SDOH — ECONOMIC STABILITY: INCOME INSECURITY: HOW HARD IS IT FOR YOU TO PAY FOR THE VERY BASICS LIKE FOOD, HOUSING, MEDICAL CARE, AND HEATING?: NOT HARD AT ALL

## 2024-10-04 SDOH — SOCIAL STABILITY: SOCIAL INSECURITY
WITHIN THE LAST YEAR, HAVE YOU BEEN KICKED, HIT, SLAPPED, OR OTHERWISE PHYSICALLY HURT BY YOUR PARTNER OR EX-PARTNER?: NO

## 2024-10-04 SDOH — SOCIAL STABILITY: SOCIAL INSECURITY
WITHIN THE LAST YEAR, HAVE TO BEEN RAPED OR FORCED TO HAVE ANY KIND OF SEXUAL ACTIVITY BY YOUR PARTNER OR EX-PARTNER?: NO

## 2024-10-04 SDOH — ECONOMIC STABILITY: TRANSPORTATION INSECURITY
IN THE PAST 12 MONTHS, HAS THE LACK OF TRANSPORTATION KEPT YOU FROM MEDICAL APPOINTMENTS OR FROM GETTING MEDICATIONS?: NO

## 2024-10-04 SDOH — ECONOMIC STABILITY: HOUSING INSECURITY: IN THE LAST 12 MONTHS, WAS THERE A TIME WHEN YOU WERE NOT ABLE TO PAY THE MORTGAGE OR RENT ON TIME?: NO

## 2024-10-04 SDOH — ECONOMIC STABILITY: TRANSPORTATION INSECURITY: IN THE PAST 12 MONTHS, HAS LACK OF TRANSPORTATION KEPT YOU FROM MEDICAL APPOINTMENTS OR FROM GETTING MEDICATIONS?: NO

## 2024-10-04 SDOH — SOCIAL STABILITY: SOCIAL INSECURITY: WITHIN THE LAST YEAR, HAVE YOU BEEN HUMILIATED OR EMOTIONALLY ABUSED IN OTHER WAYS BY YOUR PARTNER OR EX-PARTNER?: NO

## 2024-10-04 SDOH — ECONOMIC STABILITY: FOOD INSECURITY: HOW HARD IS IT FOR YOU TO PAY FOR THE VERY BASICS LIKE FOOD, HOUSING, MEDICAL CARE, AND HEATING?: NOT HARD AT ALL

## 2024-10-04 SDOH — ECONOMIC STABILITY: INCOME INSECURITY: IN THE PAST 12 MONTHS HAS THE ELECTRIC, GAS, OIL, OR WATER COMPANY THREATENED TO SHUT OFF SERVICES IN YOUR HOME?: NO

## 2024-10-04 SDOH — SOCIAL STABILITY: SOCIAL INSECURITY: HAVE YOU HAD ANY THOUGHTS OF HARMING ANYONE ELSE?: NO

## 2024-10-04 SDOH — ECONOMIC STABILITY: INCOME INSECURITY: IN THE LAST 12 MONTHS, WAS THERE A TIME WHEN YOU WERE NOT ABLE TO PAY THE MORTGAGE OR RENT ON TIME?: NO

## 2024-10-04 SDOH — ECONOMIC STABILITY: FOOD INSECURITY: WITHIN THE PAST 12 MONTHS, THE FOOD YOU BOUGHT JUST DIDN'T LAST AND YOU DIDN'T HAVE MONEY TO GET MORE.: NEVER TRUE

## 2024-10-04 SDOH — ECONOMIC STABILITY: FOOD INSECURITY: WITHIN THE PAST 12 MONTHS, YOU WORRIED THAT YOUR FOOD WOULD RUN OUT BEFORE YOU GOT THE MONEY TO BUY MORE.: NEVER TRUE

## 2024-10-04 SDOH — ECONOMIC STABILITY: FOOD INSECURITY: WITHIN THE PAST 12 MONTHS, YOU WORRIED THAT YOUR FOOD WOULD RUN OUT BEFORE YOU GOT MONEY TO BUY MORE.: NEVER TRUE

## 2024-10-04 SDOH — SOCIAL STABILITY: SOCIAL INSECURITY
WITHIN THE LAST YEAR, HAVE YOU BEEN RAPED OR FORCED TO HAVE ANY KIND OF SEXUAL ACTIVITY BY YOUR PARTNER OR EX-PARTNER?: NO

## 2024-10-04 SDOH — ECONOMIC STABILITY: TRANSPORTATION INSECURITY
IN THE PAST 12 MONTHS, HAS LACK OF TRANSPORTATION KEPT YOU FROM MEETINGS, WORK, OR FROM GETTING THINGS NEEDED FOR DAILY LIVING?: NO

## 2024-10-04 SDOH — SOCIAL STABILITY: SOCIAL INSECURITY: ABUSE: ADULT

## 2024-10-04 SDOH — SOCIAL STABILITY: SOCIAL INSECURITY: HAVE YOU HAD THOUGHTS OF HARMING ANYONE ELSE?: NO

## 2024-10-04 SDOH — SOCIAL STABILITY: SOCIAL INSECURITY: DO YOU FEEL UNSAFE GOING BACK TO THE PLACE WHERE YOU ARE LIVING?: NO

## 2024-10-04 SDOH — SOCIAL STABILITY: SOCIAL INSECURITY: WERE YOU ABLE TO COMPLETE ALL THE BEHAVIORAL HEALTH SCREENINGS?: YES

## 2024-10-04 SDOH — SOCIAL STABILITY: SOCIAL INSECURITY: ARE YOU OR HAVE YOU BEEN THREATENED OR ABUSED PHYSICALLY, EMOTIONALLY, OR SEXUALLY BY ANYONE?: NO

## 2024-10-04 ASSESSMENT — PAIN SCALES - GENERAL
PAINLEVEL_OUTOF10: 4
PAINLEVEL_OUTOF10: 5 - MODERATE PAIN
PAINLEVEL_OUTOF10: 5 - MODERATE PAIN
PAINLEVEL_OUTOF10: 6
PAINLEVEL_OUTOF10: 5 - MODERATE PAIN
PAINLEVEL_OUTOF10: 9
PAINLEVEL_OUTOF10: 4
PAINLEVEL_OUTOF10: 6
PAINLEVEL_OUTOF10: 9
PAINLEVEL_OUTOF10: 4

## 2024-10-04 ASSESSMENT — LIFESTYLE VARIABLES
SKIP TO QUESTIONS 9-10: 1
AUDIT-C TOTAL SCORE: 0
HOW MANY STANDARD DRINKS CONTAINING ALCOHOL DO YOU HAVE ON A TYPICAL DAY: PATIENT DOES NOT DRINK
HOW OFTEN DO YOU HAVE 6 OR MORE DRINKS ON ONE OCCASION: NEVER
HAVE PEOPLE ANNOYED YOU BY CRITICIZING YOUR DRINKING: NO
TOTAL SCORE: 0
HOW OFTEN DO YOU HAVE A DRINK CONTAINING ALCOHOL: NEVER
EVER FELT BAD OR GUILTY ABOUT YOUR DRINKING: NO
AUDIT-C TOTAL SCORE: 0
HAVE YOU EVER FELT YOU SHOULD CUT DOWN ON YOUR DRINKING: NO
EVER HAD A DRINK FIRST THING IN THE MORNING TO STEADY YOUR NERVES TO GET RID OF A HANGOVER: NO

## 2024-10-04 ASSESSMENT — ENCOUNTER SYMPTOMS
APNEA: 0
ARTHRALGIAS: 0
NERVOUS/ANXIOUS: 0
SLEEP DISTURBANCE: 0
ABDOMINAL PAIN: 0
SHORTNESS OF BREATH: 0
PHOTOPHOBIA: 0
CONSTIPATION: 0
EYE DISCHARGE: 0
LIGHT-HEADEDNESS: 0
CONFUSION: 0
PALPITATIONS: 0
NAUSEA: 0
SEIZURES: 0
HALLUCINATIONS: 0
BACK PAIN: 0
WOUND: 0
SINUS PAIN: 0
FEVER: 0
COUGH: 0
FATIGUE: 0
DYSURIA: 0
TROUBLE SWALLOWING: 0
BLOOD IN STOOL: 0
WEAKNESS: 0
HEMATURIA: 0
POLYDIPSIA: 0
HEADACHES: 0
MYALGIAS: 0
EYE ITCHING: 0
WHEEZING: 0
BRUISES/BLEEDS EASILY: 0
COLOR CHANGE: 0
FLANK PAIN: 0
SORE THROAT: 0
APPETITE CHANGE: 0
UNEXPECTED WEIGHT CHANGE: 0
POLYPHAGIA: 0
DIFFICULTY URINATING: 0
DIZZINESS: 0
TREMORS: 0
DYSPHORIC MOOD: 0
VOMITING: 0
VOICE CHANGE: 0
NECK PAIN: 0
DIARRHEA: 0
CHOKING: 0
NECK STIFFNESS: 0
NUMBNESS: 0
SPEECH DIFFICULTY: 0
CHEST TIGHTNESS: 0
EYE PAIN: 0
FREQUENCY: 0
CHILLS: 0
ABDOMINAL DISTENTION: 0
ADENOPATHY: 0

## 2024-10-04 ASSESSMENT — ACTIVITIES OF DAILY LIVING (ADL)
PATIENT'S MEMORY ADEQUATE TO SAFELY COMPLETE DAILY ACTIVITIES?: NO
JUDGMENT_ADEQUATE_SAFELY_COMPLETE_DAILY_ACTIVITIES: NO
TOILETING: INDEPENDENT
WALKS IN HOME: NEEDS ASSISTANCE
ASSISTIVE_DEVICE: WALKER;CANE
LACK_OF_TRANSPORTATION: NO
DRESSING YOURSELF: INDEPENDENT
FEEDING YOURSELF: INDEPENDENT
ADEQUATE_TO_COMPLETE_ADL: NO
BATHING: INDEPENDENT
GROOMING: INDEPENDENT
LACK_OF_TRANSPORTATION: NO
HEARING - LEFT EAR: FUNCTIONAL
HEARING - RIGHT EAR: FUNCTIONAL

## 2024-10-04 ASSESSMENT — COGNITIVE AND FUNCTIONAL STATUS - GENERAL
WALKING IN HOSPITAL ROOM: TOTAL
WALKING IN HOSPITAL ROOM: TOTAL
HELP NEEDED FOR BATHING: A LOT
HELP NEEDED FOR BATHING: A LOT
TURNING FROM BACK TO SIDE WHILE IN FLAT BAD: A LITTLE
CLIMB 3 TO 5 STEPS WITH RAILING: TOTAL
DAILY ACTIVITIY SCORE: 15
DAILY ACTIVITIY SCORE: 15
PATIENT BASELINE BEDBOUND: NO
TURNING FROM BACK TO SIDE WHILE IN FLAT BAD: A LITTLE
DRESSING REGULAR LOWER BODY CLOTHING: TOTAL
HELP NEEDED FOR BATHING: A LOT
MOBILITY SCORE: 11
DRESSING REGULAR UPPER BODY CLOTHING: A LITTLE
TOILETING: A LOT
DRESSING REGULAR UPPER BODY CLOTHING: A LITTLE
MOVING TO AND FROM BED TO CHAIR: A LOT
TOILETING: A LOT
STANDING UP FROM CHAIR USING ARMS: TOTAL
TOILETING: A LOT
PERSONAL GROOMING: A LITTLE
MOVING FROM LYING ON BACK TO SITTING ON SIDE OF FLAT BED WITH BEDRAILS: A LITTLE
PERSONAL GROOMING: A LITTLE
MOVING FROM LYING ON BACK TO SITTING ON SIDE OF FLAT BED WITH BEDRAILS: A LITTLE
PERSONAL GROOMING: A LOT
STANDING UP FROM CHAIR USING ARMS: TOTAL
MOBILITY SCORE: 11
MOVING FROM LYING ON BACK TO SITTING ON SIDE OF FLAT BED WITH BEDRAILS: A LITTLE
MOBILITY SCORE: 11
MOVING TO AND FROM BED TO CHAIR: A LOT
TURNING FROM BACK TO SIDE WHILE IN FLAT BAD: A LITTLE
DRESSING REGULAR LOWER BODY CLOTHING: TOTAL
STANDING UP FROM CHAIR USING ARMS: TOTAL
CLIMB 3 TO 5 STEPS WITH RAILING: TOTAL
DRESSING REGULAR LOWER BODY CLOTHING: TOTAL
DRESSING REGULAR UPPER BODY CLOTHING: A LITTLE
MOVING TO AND FROM BED TO CHAIR: A LOT
CLIMB 3 TO 5 STEPS WITH RAILING: TOTAL
WALKING IN HOSPITAL ROOM: TOTAL
DAILY ACTIVITIY SCORE: 14

## 2024-10-04 ASSESSMENT — PAIN DESCRIPTION - PAIN TYPE: TYPE: ACUTE PAIN

## 2024-10-04 ASSESSMENT — PATIENT HEALTH QUESTIONNAIRE - PHQ9
1. LITTLE INTEREST OR PLEASURE IN DOING THINGS: NOT AT ALL
SUM OF ALL RESPONSES TO PHQ9 QUESTIONS 1 & 2: 0
2. FEELING DOWN, DEPRESSED OR HOPELESS: NOT AT ALL

## 2024-10-04 ASSESSMENT — PAIN DESCRIPTION - DESCRIPTORS: DESCRIPTORS: ACHING;DISCOMFORT

## 2024-10-04 ASSESSMENT — PAIN DESCRIPTION - LOCATION
LOCATION: HIP
LOCATION: HIP
LOCATION: LEG
LOCATION: HIP

## 2024-10-04 ASSESSMENT — PAIN - FUNCTIONAL ASSESSMENT
PAIN_FUNCTIONAL_ASSESSMENT: 0-10

## 2024-10-04 ASSESSMENT — PAIN DESCRIPTION - ORIENTATION
ORIENTATION: LEFT

## 2024-10-04 NOTE — CONSULTS
[facility]/TKA Related Summary                                                                                                   L hip: N  L knee: N  R hip: N  R knee: N  Lumbar surgery or significant pathology: N              CC                                                                                                                                  Left hip/pelvis pain              HPI                                                                                                                                Funmilayo Montenegro is a 79 y.o. female who was in her usual state of health until last night, at which time she sustained a mechanical GLF while trying to change her clothes.  She had immediate pain in her L hip/pelvis and ultimately presented to the ED this morning for this pain that is worse with ambulating. Denies other symptoms, numbness/tingling.                 HISTORIES (System Generated and “Additional” by interview)                                                          PMH system-generated (PMH, problem list both included since EMR change caused discrepancies): No past medical history on file.  Patient Active Problem List   Diagnosis    Atherosclerosis of aorta (CMS-HCC)    COPD (chronic obstructive pulmonary disease) (Multi)    Coronary arteriosclerosis    Primary hypertension    Gastroesophageal reflux disease    Major depressive disorder    Mixed hyperlipidemia    Type 2 diabetes mellitus with diabetic nephropathy, without long-term current use of insulin    Restless legs    Osteoporosis    Acquired spondylolisthesis of lumbosacral region    H/O idiopathic urticaria    Recurrent falls    Lumbar radiculopathy    Cigarette nicotine dependence with nicotine-induced disorder    Recurrent major depression in full remission (CMS-HCC)    Pelvic ring fracture, closed, initial encounter (Multi)    Type 2 diabetes mellitus without complication, without long-term current use of insulin (Multi)  "  Additional note: Denies other health issues, DVT/PE, malignancy.     PSH system-generated: No past surgical history on file.  Additional note: Denies other pertinent injuries/surgeries, including to currently injured area.     FH system-generated: No family history on file.  Additional note: NC. Denies DVT/PE.     SH system-generated:  Social History      Tobacco Use    Smoking status: Every Day       Current packs/day: 0.75       Average packs/day: 0.8 packs/day for 60.8 years (45.6 ttl pk-yrs)       Types: Cigarettes       Start date: 1964    Smokeless tobacco: Never   Substance Use Topics    Alcohol use: Never    Drug use: Never   Additional note: 0.75PPD, neg etoh, neg illicits per patient interview. Baseline ambulation: Cane inside, walker outside. Living arrangements: Lives with nephew and 3 kids.     Allergies system-generated:   Allergies   Allergen Reactions    Omeprazole Hives       hives    Ace Inhibitors Hives    Bupropion Hives    Bupropion Hcl Hives       hives    Codeine Other    Penicillins Other    Sulfamethoxazole-Trimethoprim Hives            Current meds system-generated:  Current Facility-Administered Medications:    nicotine (Nicoderm CQ) 14 mg/24 hr patch 1 patch, 1 patch, transdermal, Daily **FOLLOWED BY** [START ON 11/15/2024] nicotine (Nicoderm CQ) 7 mg/24 hr patch 1 patch, 1 patch, transdermal, Daily, Renzo Layne, APRN-CNP  Additional note: Does not take blood thinner.     ROS: Neg except HPI.                 OBJECTIVE                                                                                                                       Physical Exam  Estimated body mass index is 28.07 kg/m² as calculated from the following:    Height as of this encounter: 1.422 m (4' 8\").    Weight as of this encounter: 56.8 kg (125 lb 3.5 oz).  General: NAD, NLR on RA, AOx3     VS:  Temp:  [36.8 °C (98.2 °F)] 36.8 °C (98.2 °F)  Heart Rate:  [80-92] 82  Resp:  [18-22] 20  BP: (110-158)/() " 124/62     Focused MSK exam:  BUE:  -Grossly intact  -Palpable radial pulse, BCR  -Skin no open wounds, tenting.  Senile purpura throughout  -Bone not TTP over major prominences  -Painless ROM at shoulder/elbow/wrist  -Compartments soft and compressible, no additional pain with passive stretch  -Motor: Fires FF (including thumb IP), FE, EPL, IO.  -Sensory: SILT in ax/m/r/u nerve distributions.     LLE:  -Grossly intact  -Palpable DP/PT pulse, BCR  -Skin no open wounds, tenting  -TTP about hip  -Mild pain with logroll. Painless ROM at knee/ankle  -Compartments soft and compressible, no additional pain with passive stretch.  -Motor: Fires quads, TA, EHL, GCS  -Sensory: SILT in saph/tracy/sp/dp/t nerve distributions.     RLE:  -Grossly intact  -Palpable DP/PT pulse, BCR  -Skin no open wounds, tenting  -Bone not TTP over major prominences  -Neg log roll. Painless ROM at hip/knee/ankle  -Compartments soft and compressible, no additional pain with passive stretch.  -Motor: Fires quads, TA, EHL, GCS  -Sensory: SILT in saph/tracy/sp/dp/t nerve distributions.        Labs (notable/relevant)  WBC        18.0x10^3/uL  Hgb          14.2g/dL  INR          1.1  Plt            291x10^3/uL  Cr             0.7mg/dL  T+S          A-  A1c          na  CRP          na  ESR          na        Imaging  Radiographs of L hip (AP/lat hip, AP pelvis). CT pelvis: Acute nondisplaced fractures of anterior cortex L superior pubic ramus and acetabular junction, L pubic symphysis, and L inferior pubic ramus.               ASSESSMENT & PLAN                                                                                                          A/P: Funmilayo Montenegro is a 79 y.o. female presenting with acute nondisplaced fractures of anterior cortex L superior pubic ramus and acetabular junction, L pubic symphysis, L inferior pubic ramus.  Diagnosis  Fx pattern is roughly equivalent to an inferior and superior pubic rami combination, as the superior  pubic ramus portion does not enter the joint space.  I do not see a sacral component to make it a full LC1.  General information  In general, LC1 fractures (and similar patterns such as this patient) can be treated nonoperatively with initially toe-touch weightbearing and a walker, ultimately progressing weightbearing over time as comfort allows.  Surgical fixation is always an option, although it is typically not necessary with these fracture patterns and patient is already quite deconditioned at baseline.  Shared decision making  After discussing the pros and cons of nonoperative and operative treatment, patient elected for nonoperative treatment.  She will begin working with therapy with a walker and toe-touch weightbearing.  Follow-up  In 2 weeks in outpatient orthopedic clinic.  At that point, plan for repeat pelvic and L hip radiographs.  Assuming she is feeling well, she will be able to begin progressing weightbearing at that point.  PMH, PSH, other considerations discussed   DM (A1c 8.4 on 7/27/24)  Cigarette smoker  Osteoporosis: Given injury and low bone density noted on radiographs, please ensure patient follows up with PCP and/or endocrinology for bone density assessment and possible treatment to prevent future injuries.  Lumbar radiculopathy  COPD  CAD  PCN allergy (hives)  Occupation: Retired   Hobbies: Going to [place], gardening

## 2024-10-04 NOTE — PROGRESS NOTES
Funmilayo Montenegro is a 79 y.o. female admitted for Pelvic ring fracture, closed, initial encounter (Multi). Pharmacy reviewed the patient's [id number] medications and allergies for accuracy.    The list below reflects the PTA list prior to pharmacy medication history. A summary a changes to the PTA medication list has been listed below. Please review each medication in order reconciliation for additional clarification and justification.    Source of information: T2P     Medications added:    Medications modified:    Medications to be removed:  FLEXERIL 5MG  IPRATROPIUM NASAL   PRILOSEC 20MG  RYBELSUS 3MG     Medications of concern:      Prior to Admission Medications   Prescriptions Last Dose Informant Patient Reported? Taking?   Accu-Chek Meghan Plus test strp strip   Yes No   Sig: Accu-Chek Meghan Plus In Vitro Strip   Quantity: 200  Refills: 0        Start : 2-Jun-2017   Active   IPRATROPIUM BROMIDE NASL   Yes No   Sig: Administer into affected nostril(s).   Ventolin HFA 90 mcg/actuation inhaler   Yes No   Sig: Inhale 2 puffs every 4 hours if needed.   amLODIPine (Norvasc) 10 mg tablet 10/3/2024  No Yes   Sig: Take 1 tablet (10 mg) by mouth once daily.   aspirin 81 mg EC tablet Past Week  Yes Yes   Sig: Take 1 tablet (81 mg) by mouth once daily.   atorvastatin (Lipitor) 40 mg tablet 10/3/2024  No Yes   Sig: Take 1 tablet (40 mg) by mouth once daily.   carvedilol (Coreg) 6.25 mg tablet 10/3/2024  No Yes   Sig: Take 1 tablet (6.25 mg) by mouth 2 times daily (morning and late afternoon).   cyclobenzaprine (Flexeril) 5 mg tablet 10/3/2024  Yes Yes   Sig: Take 1 tablet (5 mg) by mouth as needed at bedtime.   dulaglutide (Trulicity) 0.75 mg/0.5 mL pen injector   No No   Sig: Inject 0.75 mg under the skin 1 (one) time per week.   glimepiride (Amaryl) 4 mg tablet   No No   Sig: Take 1 tablet (4 mg) by mouth 2 times a day.   isosorbide mononitrate ER (Imdur) 60 mg 24 hr tablet 10/3/2024  No Yes   Sig: Take 1  "tablet (60 mg) by mouth once daily.   loratadine (Claritin) 10 mg tablet Past Week  No Yes   Sig: Take 1 tablet (10 mg) by mouth once daily.   metFORMIN (Glucophage) 1,000 mg tablet Past Week  No Yes   Sig: Take 1 tablet (1,000 mg) by mouth 2 times daily (morning and late afternoon).   omeprazole (PriLOSEC) 40 mg DR capsule Past Week  Yes Yes   Sig: Take 1 capsule (40 mg) by mouth. Do not crush or chew.   pen needle, diabetic (NovoTwist) 32 gauge x 1/5\" needle   Yes No   pioglitazone (Actos) 15 mg tablet   No No   Sig: Take 1 tablet (15 mg) by mouth once daily.   semaglutide (Rybelsus) 3 mg tablet   No No   Sig: Take 1 tablet (3 mg) by mouth once daily.   sertraline (Zoloft) 100 mg tablet Past Week  No Yes   Sig: Take 1 tablet (100 mg) by mouth once daily.   tiotropium-olodateroL (Stiolto Respimat) 2.5-2.5 mcg/actuation mist inhaler   No No   Sig: Inhale 2 Inhalations once daily.      Facility-Administered Medications: None       AUGIE BUCKNER        "

## 2024-10-04 NOTE — PROGRESS NOTES
Physical Therapy                 Therapy Communication Note    Patient Name: Funmilayo Montenegro  MRN: [id number]  Department: [facility] 3 E  Room: [address] [place] [address]  Today's Date: 10/4/2024     Discipline: Physical Therapy    Missed Visit Reason: Missed Visit Reason: Patient placed on medical hold (PT. W/ PELVIC FX, ORTHO ON CONSULT, WILL AWAIT ORTHO MOBILITY RECOMMENDATIONS TO INITIATE THERAPY)    Missed Time: Attempt    Comment:

## 2024-10-04 NOTE — PROGRESS NOTES
Occupational Therapy                 Therapy Communication Note    Patient Name: Funmilayo Montenegro  MRN: [id number]  Department: [facility] 3 E  Room: [address] [place] [address]  Today's Date: 10/4/2024     Discipline: Occupational Therapy    Missed Visit Reason: Missed Visit Reason: Patient placed on medical hold (Ortho consult pending - will reattempt OT EVAL once cleared by ortho)    Missed Time: Attempt    Comment:

## 2024-10-04 NOTE — ED PROVIDER NOTES
HPI   No chief complaint on file.      Chief complaint: Fall    History of present illness: Patient is a 79-year-old female with history of COPD hypertension and diabetes presenting to the emergency department after a fall.  According to the patient, she had a fall yesterday evening.  The patient states that she was unable to get up off the ground after a fall.  The patient states that she is now experiencing pain in her neck as well as pain in her left hip.  The patient does not believe that she lost consciousness when she fell.  This morning, the patient was discovered EMS was called and the patient was brought to the emergency department for further evaluation she has no other complaints at this time she denies any recent fever      History provided by:  Patient   used: No            Patient History   No past medical history on file.  No past surgical history on file.  No family history on file.  Social History     Tobacco Use    Smoking status: Every Day     Current packs/day: 0.75     Average packs/day: 0.8 packs/day for 60.8 years (45.6 ttl pk-yrs)     Types: Cigarettes     Start date: 1964    Smokeless tobacco: Never   Substance Use Topics    Alcohol use: Never    Drug use: Never       Physical Exam   ED Triage Vitals   Temp Pulse Resp BP   -- -- -- --      SpO2 Temp src Heart Rate Source Patient Position   -- -- -- --      BP Location FiO2 (%)     -- --       Physical Exam  Constitutional:       Appearance: Normal appearance.   HENT:      Head: Normocephalic and atraumatic.      Right Ear: External ear normal.      Left Ear: External ear normal.      Nose: Nose normal.      Mouth/Throat:      Mouth: Mucous membranes are moist.   Eyes:      General: Lids are normal.      Extraocular Movements: Extraocular movements intact.      Pupils: Pupils are equal, round, and reactive to light.   Cardiovascular:      Rate and Rhythm: Normal rate and regular rhythm.      Heart sounds: Normal heart  sounds.   Pulmonary:      Effort: Pulmonary effort is normal.      Breath sounds: Normal breath sounds.   Abdominal:      General: Abdomen is flat.      Palpations: Abdomen is soft.      Tenderness: There is no abdominal tenderness. There is no guarding or rebound.   Musculoskeletal:         General: No deformity.      Cervical back: Normal range of motion and neck supple.      Left hip: Tenderness and bony tenderness present. No deformity or lacerations. Decreased range of motion.   Skin:     General: Skin is warm.      Capillary Refill: Capillary refill takes less than 2 seconds.      Coloration: Skin is not jaundiced.   Neurological:      General: No focal deficit present.      Mental Status: She is alert and oriented to person, place, and time.   Psychiatric:         Mood and Affect: Mood normal.         Behavior: Behavior normal.           ED Course & MDM   Diagnoses as of 10/07/24 1930   Pelvic ring fracture, closed, initial encounter (Multi)                 No data recorded                                 Medical Decision Making  Medical Decision Making: Patient remained stable during her time in the emergency department.  CBC demonstrated a white cell count of 18 but no other significant acute abnormalities Chem-7 and LFTs were all within normal limits.  PTT INR were both within normal limits CK was 150 blood type is a positive meanwhile the patient's urinalysis demonstrated no significant acute abnormalities.  CAT scan of the patient's head demonstrated chronic changes but no significant posttraumatic abnormalities CAT scan the patient's cervical spine demonstrated no significant acute abnormalities x-ray of the patient's chest demonstrated possible pulmonary edema x-ray of the patient's left hip and pelvis demonstrated no evidence of fracture we will CAT scan the patient's pelvis demonstrated a nondisplaced fracture of the anterior pelvic arch with no other significant acute abnormalities.  The patient's  EKG demonstrates a normal sinus rhythm with a rate of 97 bpm isoelectric ST segments narrow QRS complexes and a QTc of 452.    Patient presents to the emergency department after a fall.  Workup was performed as above and demonstrated the presence of pelvic ring fracture after the patient's fall at this time, the patient is unable to ambulate and as result will require admission to the hospital for further evaluation and therapy and possible placement into rehab.  I spoke with the hospitalist who agreed the patient could be admitted to their service for further evaluation and therapy.  Patient was then admitted to the hospital in otherwise stable condition.    Amount and/or Complexity of Data Reviewed  Labs: ordered. Decision-making details documented in ED Course.  Radiology: ordered. Decision-making details documented in ED Course.  ECG/medicine tests: ordered and independent interpretation performed. Decision-making details documented in ED Course.        Procedure  Procedures     Jean Ramos MD  10/07/24 1935

## 2024-10-04 NOTE — ED TRIAGE NOTES
Pt to ER via ambulance with c/o left hip pain and neck pain after falling last night around 10 pm. Pt states she did not lose consciousness, but does not know why she fell. Family called EMS this morning.

## 2024-10-04 NOTE — ED NOTES
Pt arrives to ED via squad from home after falling last night. Pt c/o left hip and neck pain.    Code Status:  No Order    HPI     Chief Complaint   Patient presents with    Fall     C/O fall at home around 10 pm last night, pt does not know why she fell.       /61 (BP Location: Left arm, Patient Position: Sitting)   Pulse 81   Temp 36.8 °C (98.2 °F) (Tympanic)   Resp (!) 22   Wt 56.8 kg (125 lb 3.5 oz)   SpO2 98%     Sallie Coma Scale Score: 15      LDA:   Peripheral IV 10/04/24 20 G Distal;Right;Upper;Anterior Arm (Active)   Placement Date/Time: 10/04/24 0739   Hand Hygiene Completed: Yes  Size (Gauge): 20 G  Orientation: Distal;Right;Upper;Anterior  Location: Arm  Site Prep: Chlorhexidine    Number of days: 0       External Urinary Catheter Female (Active)   Placement Date/Time: 10/04/24 0732   Placed by: Chari Wray RN  Hand Hygiene Completed: Yes  External Catheter Type: Female   Number of days: 0        BACKGROUND  No past medical history on file.  No past surgical history on file.  No current facility-administered medications on file prior to encounter.     Current Outpatient Medications on File Prior to Encounter   Medication Sig Dispense Refill    Accu-Chek Meghan Plus test strp strip Accu-Chek Meghan Plus In Vitro Strip   Quantity: 200  Refills: 0        Start : 2-Jun-2017   Active      amLODIPine (Norvasc) 10 mg tablet Take 1 tablet (10 mg) by mouth once daily. 90 tablet 3    aspirin 81 mg EC tablet Take 1 tablet (81 mg) by mouth once daily.      atorvastatin (Lipitor) 40 mg tablet Take 1 tablet (40 mg) by mouth once daily. 90 tablet 3    carvedilol (Coreg) 6.25 mg tablet Take 1 tablet (6.25 mg) by mouth 2 times daily (morning and late afternoon). 180 tablet 0    cyclobenzaprine (Flexeril) 5 mg tablet Take 1 tablet (5 mg) by mouth as needed at bedtime.      dulaglutide (Trulicity) 0.75 mg/0.5 mL pen injector Inject 0.75 mg under the skin 1 (one) time per week. 2 mL 1    glimepiride (Amaryl)  "4 mg tablet Take 1 tablet (4 mg) by mouth 2 times a day. 180 tablet 3    IPRATROPIUM BROMIDE NASL Administer into affected nostril(s).      isosorbide mononitrate ER (Imdur) 60 mg 24 hr tablet Take 1 tablet (60 mg) by mouth once daily. 90 tablet 3    loratadine (Claritin) 10 mg tablet Take 1 tablet (10 mg) by mouth once daily. 30 tablet 11    metFORMIN (Glucophage) 1,000 mg tablet Take 1 tablet (1,000 mg) by mouth 2 times daily (morning and late afternoon). 180 tablet 3    omeprazole (PriLOSEC) 40 mg DR capsule Take 1 capsule (40 mg) by mouth. Do not crush or chew.      pen needle, diabetic (NovoTwist) 32 gauge x 1/5\" needle       pioglitazone (Actos) 15 mg tablet Take 1 tablet (15 mg) by mouth once daily. 90 tablet 3    semaglutide (Rybelsus) 3 mg tablet Take 1 tablet (3 mg) by mouth once daily. 90 tablet 1    sertraline (Zoloft) 100 mg tablet Take 1 tablet (100 mg) by mouth once daily. 90 tablet 3    tiotropium-olodateroL (Stiolto Respimat) 2.5-2.5 mcg/actuation mist inhaler Inhale 2 Inhalations once daily. 4 g 11    Ventolin HFA 90 mcg/actuation inhaler Inhale 2 puffs every 4 hours if needed.          ASSESSMENT       Medications Currently Running:       Medications Given:  ED Medication Administration from 10/04/2024 0725 to 10/04/2024 1003         Date/Time Order Dose Route Action Action by     10/04/2024 0742 EDT morphine injection 4 mg 4 mg intravenous Given SHRUTI Wray     10/04/2024 0742 EDT ondansetron (Zofran) injection 4 mg 4 mg intravenous Given SHRUTI Wray                 RESULTS    Imaging:  XR chest 1 view   Final Result   Pulmonary vascular indistinctness along with hazy bibasilar   infiltrates are present suggesting the possibility of pulmonary   edema/CHF. Follow-up to assure clearing is suggested.        MACRO:   none        Signed by: Ishan Clifford 10/4/2024 9:51 AM   Dictation workstation:   [id number]      CT pelvis wo IV contrast   Final Result   1. Acute nondisplaced fractures of the " anterior pelvic arch to the   left of midline are present in 3 locations as detailed above.   2. Visualized portions of the right and left femur as well as the   remainder of the pelvic ring are intact.   3. Sigmoid diverticulosis without CT evidence for diverticulitis.             MACRO:   none        Signed by: Ishan Clifford 10/4/2024 9:49 AM   Dictation workstation:   [id number]      CT head wo IV contrast   Final Result   Atrophy and chronic microvascular ischemic disease with old lacunar   infarcts of the right thalamus and right basal ganglia.        No acute intracranial process.        MACRO:   None             Signed by: Zahida Qureshi 10/4/2024 9:18 AM   Dictation workstation:   [id number]      CT cervical spine wo IV contrast   Final Result   No evidence for an acute fracture or subluxation of the cervical   spine.        MACRO:   None        Signed by: Zahida Qureshi 10/4/2024 9:24 AM   Dictation workstation:   [id number]      XR hip left with pelvis when performed 2 or 3 views   Final Result   Limited study. Mild degenerative changes without acute fracture or   subluxation.        Signed by: Sarthak Lacey 10/4/2024 8:35 AM   Dictation workstation:   [id number]         }  Labs ::99  Abnormal Labs Reviewed   CBC WITH AUTO DIFFERENTIAL - Abnormal; Notable for the following components:       Result Value    WBC 18.0 (*)     RDW 16.3 (*)     Immature Granulocytes %, Automated 1.7 (*)     Neutrophils Absolute 15.84 (*)     All other components within normal limits   COMPREHENSIVE METABOLIC PANEL - Abnormal; Notable for the following components:    Glucose 380 (*)     Sodium 135 (*)     All other components within normal limits   URINALYSIS WITH REFLEX CULTURE AND MICROSCOPIC - Abnormal; Notable for the following components:    Color, Urine Colorless (*)     Protein, Urine 100 (2+) (*)     Glucose, Urine OVER (4+) (*)     Blood, Urine 0.1 (1+) (*)     Ketones, Urine TRACE (*)     All other components within  normal limits    Narrative:     OVER is reported when the result is greater than the clinically reportable range.   POCT GLUCOSE - Abnormal; Notable for the following components:    POCT Glucose 364 (*)     All other components within normal limits                   Chari Wray RN  10/04/24 9805

## 2024-10-04 NOTE — CARE PLAN
The patient's goals for the shift include        Problem: Skin  Goal: Decreased wound size/increased tissue granulation at next dressing change  Outcome: Progressing  Flowsheets (Taken 10/4/2024 1406)  Decreased wound size/increased tissue granulation at next dressing change:   Promote sleep for wound healing   Protective dressings over bony prominences  Goal: Participates in plan/prevention/treatment measures  Outcome: Progressing  Flowsheets (Taken 10/4/2024 1406)  Participates in plan/prevention/treatment measures:   Discuss with provider PT/OT consult   Elevate heels   Increase activity/out of bed for meals  Goal: Prevent/manage excess moisture  Outcome: Progressing  Flowsheets (Taken 10/4/2024 1406)  Prevent/manage excess moisture:   Follow provider orders for dressing changes   Monitor for/manage infection if present   Cleanse incontinence/protect with barrier cream  Goal: Prevent/minimize sheer/friction injuries  Outcome: Progressing  Flowsheets (Taken 10/4/2024 1406)  Prevent/minimize sheer/friction injuries:   Turn/reposition every 2 hours/use positioning/transfer devices   Use pull sheet   Increase activity/out of bed for meals   HOB 30 degrees or less  Goal: Promote/optimize nutrition  Outcome: Progressing  Flowsheets (Taken 10/4/2024 1406)  Promote/optimize nutrition: Monitor/record intake including meals  Goal: Promote skin healing  Outcome: Progressing  Flowsheets (Taken 10/4/2024 1406)  Promote skin healing:   Assess skin/pad under line(s)/device(s)   Ensure correct size (line/device) and apply per  instructions   Protective dressings over bony prominences   Rotate device position/do not position patient on device     Problem: Pain - Adult  Goal: Verbalizes/displays adequate comfort level or baseline comfort level  Outcome: Progressing     Problem: Safety - Adult  Goal: Free from fall injury  Outcome: Progressing     Problem: Discharge Planning  Goal: Discharge to home or other facility  with appropriate resources  Outcome: Progressing     Problem: Chronic Conditions and Co-morbidities  Goal: Patient's chronic conditions and co-morbidity symptoms are monitored and maintained or improved  Outcome: Progressing     Problem: Fall/Injury  Goal: Not fall by end of shift  Outcome: Progressing  Goal: Be free from injury by end of the shift  Outcome: Progressing  Goal: Verbalize understanding of personal risk factors for fall in the hospital  Outcome: Progressing  Goal: Verbalize understanding of risk factor reduction measures to prevent injury from fall in the home  Outcome: Progressing  Goal: Use assistive devices by end of the shift  Outcome: Progressing  Goal: Pace activities to prevent fatigue by end of the shift  Outcome: Progressing     Problem: Pain  Goal: Takes deep breaths with improved pain control throughout the shift  Outcome: Progressing  Goal: Turns in bed with improved pain control throughout the shift  Outcome: Progressing  Goal: Walks with improved pain control throughout the shift  Outcome: Progressing  Goal: Performs ADL's with improved pain control throughout shift  Outcome: Progressing  Goal: Participates in PT with improved pain control throughout the shift  Outcome: Progressing  Goal: Free from opioid side effects throughout the shift  Outcome: Progressing  Goal: Free from acute confusion related to pain meds throughout the shift  Outcome: Progressing     Problem: Nutrition  Goal: BG  mg/dL  Outcome: Progressing  Goal: Lab values WNL  Outcome: Progressing  Goal: Electrolytes WNL  Outcome: Progressing  Goal: Promote healing  Outcome: Progressing

## 2024-10-04 NOTE — H&P
History Obtained From: Patient    History Of Present Illness:  Funmilayo Montenegro is a 79 y.o. female with PMHx s/f hypertension atherosclerotic heart disease, high per lipidemia, diabetes type 2, tobacco dependence, COPD, presenting after a fall with pelvic pain.  Patient sustained a fall last night while trying to change her close.  Patient was not presyncopal she did not pass out she did not hit her head.  Patient had severe pain in the groin area is unable to ambulate and come to hospital for further evaluation.  Presenting to emergency department vital signs show temperature 98.2 heart rate 92 respiratory rate 20 blood pressure 158/82 SpO2 88% on room air.  Chemistry panel shows glucose 380 sodium 135 remainder of chemistry is within normal limits including creatinine kinase liver enzymes and BN peptide.  INR is 1.1.  CBC shows leukocytosis with white blood cell count 18 hemoglobin 14.2 hematocrit 42.8 platelets 291.  Chest x-ray appears to show pulmonary vascular indistinctness along with hazy bibasilar infiltrates suggesting possible pulmonary edema CT of the pelvis shows acute nondisplaced fractures of the anterior pelvic arch CT of the head shows no acute intracranial process CT of the neck is without acute fracture or subluxation x-ray of the left hip shows no acute fracture or subluxation.  She was given 2 doses of IV morphine for pain was placed on supplemental oxygen initially her oxygen saturation was 88% on room air.  She had good relief of her pain and oxygen saturation normalized.  Patient is not typically oxygen dependent.  She is not experiencing any fevers chills she denies any cough shortness of breath or sputum production.  The ED provider discussed the findings with orthopedic on-call who felt patient most likely would not require surgery but will consult on patient in hospital the patient is admitted to the hospital for pain management and to start mobilization as well as to obtain full  orthopedic consultation.        ED Course:  Diagnoses as of 10/04/24 1315   Pelvic ring fracture, closed, initial encounter (Multi)     Relevant Results  Results for orders placed or performed during the hospital encounter of 10/04/24 (from the past 24 hour(s))   POCT GLUCOSE   Result Value Ref Range    POCT Glucose 364 (H) 74 - 99 mg/dL   CBC and Auto Differential   Result Value Ref Range    WBC 18.0 (H) 4.4 - 11.3 x10*3/uL    nRBC 0.0 0.0 - 0.0 /100 WBCs    RBC 4.89 4.00 - 5.20 x10*6/uL    Hemoglobin 14.2 12.0 - 16.0 g/dL    Hematocrit 42.8 36.0 - 46.0 %    MCV 88 80 - 100 fL    MCH 29.0 26.0 - 34.0 pg    MCHC 33.2 32.0 - 36.0 g/dL    RDW 16.3 (H) 11.5 - 14.5 %    Platelets 291 150 - 450 x10*3/uL    Neutrophils % 88.1 40.0 - 80.0 %    Immature Granulocytes %, Automated 1.7 (H) 0.0 - 0.9 %    Lymphocytes % 5.3 13.0 - 44.0 %    Monocytes % 4.5 2.0 - 10.0 %    Eosinophils % 0.1 0.0 - 6.0 %    Basophils % 0.3 0.0 - 2.0 %    Neutrophils Absolute 15.84 (H) 1.60 - 5.50 x10*3/uL    Immature Granulocytes Absolute, Automated 0.31 0.00 - 0.50 x10*3/uL    Lymphocytes Absolute 0.95 0.80 - 3.00 x10*3/uL    Monocytes Absolute 0.80 0.05 - 0.80 x10*3/uL    Eosinophils Absolute 0.01 0.00 - 0.40 x10*3/uL    Basophils Absolute 0.05 0.00 - 0.10 x10*3/uL   Comprehensive metabolic panel   Result Value Ref Range    Glucose 380 (H) 74 - 99 mg/dL    Sodium 135 (L) 136 - 145 mmol/L    Potassium 3.9 3.5 - 5.3 mmol/L    Chloride 98 98 - 107 mmol/L    Bicarbonate 27 21 - 32 mmol/L    Anion Gap 14 10 - 20 mmol/L    Urea Nitrogen 17 6 - 23 mg/dL    Creatinine 0.70 0.50 - 1.05 mg/dL    eGFR 88 >60 mL/min/1.73m*2    Calcium 9.9 8.6 - 10.3 mg/dL    Albumin 4.5 3.4 - 5.0 g/dL    Alkaline Phosphatase 61 33 - 136 U/L    Total Protein 7.9 6.4 - 8.2 g/dL    AST 13 9 - 39 U/L    Bilirubin, Total 0.7 0.0 - 1.2 mg/dL    ALT 17 7 - 45 U/L   Protime-INR   Result Value Ref Range    Protime 11.9 9.8 - 12.8 seconds    INR 1.1 0.9 - 1.1   APTT   Result Value Ref  Range    aPTT 36 27 - 38 seconds   Type And Screen   Result Value Ref Range    ABO TYPE A     Rh TYPE POS     ANTIBODY SCREEN NEG    Urinalysis with Reflex Culture and Microscopic   Result Value Ref Range    Color, Urine Colorless (N) Light-Yellow, Yellow, Dark-Yellow    Appearance, Urine Clear Clear    Specific Gravity, Urine 1.015 1.005 - 1.035    pH, Urine 6.0 5.0, 5.5, 6.0, 6.5, 7.0, 7.5, 8.0    Protein, Urine 100 (2+) (A) NEGATIVE, 10 (TRACE), 20 (TRACE) mg/dL    Glucose, Urine OVER (4+) (A) Normal mg/dL    Blood, Urine 0.1 (1+) (A) NEGATIVE    Ketones, Urine TRACE (A) NEGATIVE mg/dL    Bilirubin, Urine NEGATIVE NEGATIVE    Urobilinogen, Urine Normal Normal mg/dL    Nitrite, Urine NEGATIVE NEGATIVE    Leukocyte Esterase, Urine NEGATIVE NEGATIVE   Urinalysis Microscopic   Result Value Ref Range    WBC, Urine NONE 1-5, NONE /HPF    RBC, Urine 1-2 NONE, 1-2, 3-5 /HPF   Creatine Kinase   Result Value Ref Range    Creatine Kinase 150 0 - 215 U/L   B-type natriuretic peptide   Result Value Ref Range    BNP 42 0 - 99 pg/mL   ECG 12 lead   Result Value Ref Range    Ventricular Rate 97 BPM    Atrial Rate 97 BPM    OK Interval 186 ms    QRS Duration 86 ms    QT Interval 356 ms    QTC Calculation(Bazett) 452 ms    P Axis 62 degrees    R Axis -17 degrees    T Axis 76 degrees    QRS Count 16 beats    Q Onset 251 ms    T Offset 429 ms    QTC Fredericia 417 ms      XR chest 1 view    Result Date: 10/4/2024  Interpreted By:  Ishan Clifford, STUDY: XR CHEST 1 VIEW; 10/4/2024 9:38 am   INDICATION: CLINICAL INFORMATION: Signs/Symptoms:SOB.   COMPARISON: 04/13/2017   ACCESSION NUMBER(S): [id number]   ORDERING CLINICIAN: FREDDY GARCÍA   TECHNIQUE: Portable chest one view.   FINDINGS: The cardiac size is indeterminate in view of the AP projection.  The pulmonary vasculature is slightly indistinct suggesting mild pulmonary vascular congestion.  No alveolar consolidation is noted. No effusions are noted. Hazy bibasilar infiltrate  possibly ground-glass is suspected.       Pulmonary vascular indistinctness along with hazy bibasilar infiltrates are present suggesting the possibility of pulmonary edema/CHF. Follow-up to assure clearing is suggested.   MACRO: none   Signed by: Ishan Clifford 10/4/2024 9:51 AM Dictation workstation:   [id number]    CT pelvis wo IV contrast    Result Date: 10/4/2024  Interpreted By:  Ihsan Clifford, STUDY: CT PELVIS WO IV CONTRAST; 10/4/2024 9:18 am   INDICATION: Signs/Symptoms:Left hip pain..   COMPARISON: None.   ACCESSION NUMBER(S): [id number]   ORDERING CLINICIAN: FREDDY GARCÍA   TECHNIQUE: No oral or intravenous contrast material was utilized. One or more of the following dose reduction techniques were used: Automated exposure control Adjustment of the mA and/or kV according to patient size, and/or use of iterative reconstruction technique.   FINDINGS: PELVIC CT: *There is a nondisplaced buckle fracture of the anterior cortex of the left superior pubic ramus at its junction with the left acetabulum. *There is a nondisplaced buckle fracture of the left side of the pubic symphysis at the junction of the superior and inferior pubic rami. *There is a nondisplaced fracture through the mid aspect of the left inferior pubic ramus. *The visualized portions of the left femur are intact. *Remainder of the pelvic ring as well as the visualized portions of the right femur are intact. *Sigmoid diverticulosis is present without CT evidence for diverticulitis.       1. Acute nondisplaced fractures of the anterior pelvic arch to the left of midline are present in 3 locations as detailed above. 2. Visualized portions of the right and left femur as well as the remainder of the pelvic ring are intact. 3. Sigmoid diverticulosis without CT evidence for diverticulitis.     MACRO: none   Signed by: Ishan Clifford 10/4/2024 9:49 AM Dictation workstation:   [id number]    CT cervical spine wo IV contrast    Result Date:  10/4/2024  Interpreted By:  Zahida Qureshi, STUDY: CT CERVICAL SPINE WO IV CONTRAST;  10/4/2024 8:22 am   INDICATION: Signs/Symptoms:Fall.     COMPARISON: None.   ACCESSION NUMBER(S): [id number]   ORDERING CLINICIAN: FREDDY GARCÍA   TECHNIQUE: Axial CT images of the cervical spine are obtained. Axial, coronal and sagittal reconstructions are provided for review.   FINDINGS:     Fractures: There is no evidence for an acute fracture of the cervical spine.   Vertebral Alignment: Within normal limits.   Craniocervical Junction: The odontoid process and craniocervical junction are intact.   Vertebrae/Disc Spaces:  The cervical vertebral body heights are intact and the disc spaces are preserved.   Prevertebral/Paraspinal Soft Tissues: The prevertebral and paraspinal soft tissues are unremarkable.   Mild paraseptal emphysematous changes are seen in the pulmonary apices.       No evidence for an acute fracture or subluxation of the cervical spine.   MACRO: None   Signed by: Zahida Qureshi 10/4/2024 9:24 AM Dictation workstation:   [id number]    CT head wo IV contrast    Result Date: 10/4/2024  Interpreted By:  Zahida Qureshi, STUDY: CT HEAD WO IV CONTRAST;  10/4/2024 8:22 am   INDICATION: Signs/Symptoms:Fall.     COMPARISON: 04/05/2011   ACCESSION NUMBER(S): [id number]   ORDERING CLINICIAN: FREDDY GARCÍA   TECHNIQUE: Noncontrast axial CT scan of head was performed. Angled reformats in brain and bone windows were generated. The images were reviewed in bone, brain, blood and soft tissue windows.   FINDINGS: CSF Spaces: The ventricles, sulci and basal cisterns are enlarged, indicating age-appropriate atrophy. There is no extraaxial fluid collection.   Parenchyma: Diminished density is seen throughout the white matter of each cerebral hemisphere secondary to rather extensive chronic microvascular ischemic disease. There is an old lacunar infarct within the right thalamus as well as old lacunar infarct within right basal ganglia.  The grey-white differentiation is intact. There is no mass effect or midline shift.  There is no intracranial hemorrhage.   Calvarium: The calvarium is unremarkable. No calvarial fracture is seen.   Paranasal sinuses and mastoids: Visualized paranasal sinuses and mastoids are clear.       Atrophy and chronic microvascular ischemic disease with old lacunar infarcts of the right thalamus and right basal ganglia.   No acute intracranial process.   MACRO: None     Signed by: Zahida Qureshi 10/4/2024 9:18 AM Dictation workstation:   [id number]    XR hip left with pelvis when performed 2 or 3 views    Result Date: 10/4/2024  Interpreted By:  Sarthak Lacey, STUDY: XR HIP LEFT WITH PELVIS WHEN PERFORMED 2 OR 3 VIEWS; ;  10/4/2024 8:07 am   INDICATION: Signs/Symptoms:Fall. Pain..   COMPARISON: None.   ACCESSION NUMBER(S): [id number]   ORDERING CLINICIAN: FREDDY GARCÍA   FINDINGS: There is some limitation due to positioning.   There is no fracture or subluxation. Degenerative changes of the lower lumbar spine and to a lesser degree of the sacroiliac and hip joints bilaterally are present, with joint space narrowing and small osteophytes. The alignment is anatomic. The soft tissues are unremarkable.       Limited study. Mild degenerative changes without acute fracture or subluxation.   Signed by: Sarthak Lacey 10/4/2024 8:35 AM Dictation workstation:   [id number]    ECG 12 lead    Result Date: 10/4/2024  Sinus rhythm Borderline left axis deviation     Scheduled medications:    Continuous medications:    PRN medications:        Past Medical History  She has no past medical history on file.    Surgical History  She has no past surgical history on file.     Social History  She reports that she has been smoking cigarettes. She started smoking about 60 years ago. She has a 45.6 pack-year smoking history. She has never used smokeless tobacco. She reports that she does not drink alcohol and does not use drugs.    Family  History  No family history on file.     Allergies  Omeprazole, Ace inhibitors, Bupropion, Bupropion hcl, Codeine, Penicillins, and Sulfamethoxazole-trimethoprim    Code Status  No Order     Review of Systems   Constitutional:  Negative for appetite change, chills, fatigue, fever and unexpected weight change.   HENT:  Negative for congestion, ear discharge, ear pain, mouth sores, nosebleeds, sinus pain, sore throat, trouble swallowing and voice change.    Eyes:  Negative for photophobia, pain, discharge, itching and visual disturbance.   Respiratory:  Negative for apnea, cough, choking, chest tightness, shortness of breath and wheezing.    Cardiovascular:  Negative for chest pain, palpitations and leg swelling.   Gastrointestinal:  Negative for abdominal distention, abdominal pain, blood in stool, constipation, diarrhea, nausea and vomiting.   Endocrine: Negative for cold intolerance, heat intolerance, polydipsia, polyphagia and polyuria.   Genitourinary:  Negative for decreased urine volume, difficulty urinating, dysuria, flank pain, frequency, hematuria and urgency.   Musculoskeletal:  Negative for arthralgias, back pain, gait problem, myalgias, neck pain and neck stiffness.   Skin:  Negative for color change, pallor and wound.   Allergic/Immunologic: Negative for food allergies and immunocompromised state.   Neurological:  Negative for dizziness, tremors, seizures, syncope, speech difficulty, weakness, light-headedness, numbness and headaches.   Hematological:  Negative for adenopathy. Does not bruise/bleed easily.   Psychiatric/Behavioral:  Negative for confusion, dysphoric mood, hallucinations, sleep disturbance and suicidal ideas. The patient is not nervous/anxious.        Last Recorded Vitals  /62 (BP Location: Left arm, Patient Position: Sitting)   Pulse 82   Temp 36.8 °C (98.2 °F) (Tympanic)   Resp 20   Wt 56.8 kg (125 lb 3.5 oz)   SpO2 98%      Physical Exam  Vitals reviewed.   Constitutional:        General: She is not in acute distress.     Appearance: Normal appearance.      Comments: The smell of smoke is appreciated walking into the patient's exam room   HENT:      Head: Normocephalic and atraumatic.      Right Ear: External ear normal.      Left Ear: External ear normal.      Nose: Nose normal.      Mouth/Throat:      Mouth: Mucous membranes are moist.      Pharynx: Oropharynx is clear.   Eyes:      General: No scleral icterus.     Extraocular Movements: Extraocular movements intact.      Conjunctiva/sclera: Conjunctivae normal.      Pupils: Pupils are equal, round, and reactive to light.   Neck:      Vascular: No carotid bruit.   Cardiovascular:      Rate and Rhythm: Normal rate and regular rhythm.      Pulses: Normal pulses.      Heart sounds: Normal heart sounds.   Pulmonary:      Effort: Pulmonary effort is normal. No respiratory distress.      Breath sounds: Normal breath sounds. No wheezing, rhonchi or rales.   Chest:      Chest wall: No tenderness.   Abdominal:      General: Bowel sounds are normal. There is no distension.      Palpations: Abdomen is soft. There is no mass.      Tenderness: There is no abdominal tenderness. There is no rebound.   Musculoskeletal:         General: No swelling or deformity. Normal range of motion.      Cervical back: Normal range of motion.      Right lower leg: No edema.      Left lower leg: No edema.   Skin:     General: Skin is warm and dry.      Capillary Refill: Capillary refill takes less than 2 seconds.      Findings: No erythema, lesion or rash.   Neurological:      General: No focal deficit present.      Mental Status: She is alert and oriented to person, place, and time.      Cranial Nerves: No cranial nerve deficit.      Sensory: No sensory deficit.   Psychiatric:         Mood and Affect: Mood normal.         Behavior: Behavior normal.         Assessment/Plan   Assessment & Plan      S/P fall w pelvic fracture  Pt did not syncopize  Ortho to see pt   Krystian  Continue analgesia, vte prophylaxis PT/OT    DM2  Carbohydrate controlled diet,   Sliding scale insulin, hold long acting hypoglycemics    HLD  Continue home statin    HTN  Will reconcile home meds    CAD  Pt w/o chest pain  Continue asa/statin/amlodipine/nitrates and beta blocker    COPD  No increase in cough or sputum or shortness of breath but appears to be requiring oxygen  Unclear if this is due to morphine,   Nothing on physical exam consistent with pneumonia however chest x-ray appears to show increased density in the bases  BN peptide is normal  Will continue home meds  Add I/S and prn duoneb    Leukocytosis  Patient is without complaints of purulent sputum production fever no wounds no rashes no urinary symptoms and the UA is negative  The chest x-ray does have a slightly abnormal appearance but physical exam is not consistent with pneumonia  Review of the patient's old lab results shows that there is a persistent leukocytosis also this may be reactive due to her acute pain and fall  Will continue to monitor the patient, check a procalcitonin level consider CT of the chest if not improved in the morning    Tobacco dependence  Nicotine patch      No new Assessment & Plan notes have been filed under this hospital service since the last note was generated.  Service: Internal Medicine          Renzo Layne, APRN-CNP    Dragon dictation software was used to dictate this note and thus there may be minor errors in translation/transcription including garbled speech or misspellings. Please contact for clarification if needed.

## 2024-10-05 LAB
ANION GAP SERPL CALC-SCNC: 11 MMOL/L (ref 10–20)
ATRIAL RATE: 97 BPM
BUN SERPL-MCNC: 31 MG/DL (ref 6–23)
CALCIUM SERPL-MCNC: 9.2 MG/DL (ref 8.6–10.3)
CHLORIDE SERPL-SCNC: 98 MMOL/L (ref 98–107)
CO2 SERPL-SCNC: 29 MMOL/L (ref 21–32)
CREAT SERPL-MCNC: 0.99 MG/DL (ref 0.5–1.05)
EGFRCR SERPLBLD CKD-EPI 2021: 58 ML/MIN/1.73M*2
ERYTHROCYTE [DISTWIDTH] IN BLOOD BY AUTOMATED COUNT: 16.5 % (ref 11.5–14.5)
GLUCOSE BLD MANUAL STRIP-MCNC: 241 MG/DL (ref 74–99)
GLUCOSE BLD MANUAL STRIP-MCNC: 281 MG/DL (ref 74–99)
GLUCOSE BLD MANUAL STRIP-MCNC: 289 MG/DL (ref 74–99)
GLUCOSE BLD MANUAL STRIP-MCNC: 319 MG/DL (ref 74–99)
GLUCOSE SERPL-MCNC: 239 MG/DL (ref 74–99)
HCT VFR BLD AUTO: 37.8 % (ref 36–46)
HGB BLD-MCNC: 12.2 G/DL (ref 12–16)
MCH RBC QN AUTO: 28.7 PG (ref 26–34)
MCHC RBC AUTO-ENTMCNC: 32.3 G/DL (ref 32–36)
MCV RBC AUTO: 89 FL (ref 80–100)
NRBC BLD-RTO: 0 /100 WBCS (ref 0–0)
P AXIS: 62 DEGREES
PLATELET # BLD AUTO: 260 X10*3/UL (ref 150–450)
POTASSIUM SERPL-SCNC: 4.2 MMOL/L (ref 3.5–5.3)
PR INTERVAL: 186 MS
Q ONSET: 251 MS
QRS COUNT: 16 BEATS
QRS DURATION: 86 MS
QT INTERVAL: 356 MS
QTC CALCULATION(BAZETT): 452 MS
QTC FREDERICIA: 417 MS
R AXIS: -17 DEGREES
RBC # BLD AUTO: 4.25 X10*6/UL (ref 4–5.2)
SODIUM SERPL-SCNC: 134 MMOL/L (ref 136–145)
T AXIS: 76 DEGREES
T OFFSET: 429 MS
VENTRICULAR RATE: 97 BPM
WBC # BLD AUTO: 13.2 X10*3/UL (ref 4.4–11.3)

## 2024-10-05 PROCEDURE — 97161 PT EVAL LOW COMPLEX 20 MIN: CPT | Mod: GP

## 2024-10-05 PROCEDURE — 1200000002 HC GENERAL ROOM WITH TELEMETRY DAILY

## 2024-10-05 PROCEDURE — S4991 NICOTINE PATCH NONLEGEND: HCPCS | Performed by: NURSE PRACTITIONER

## 2024-10-05 PROCEDURE — 2500000001 HC RX 250 WO HCPCS SELF ADMINISTERED DRUGS (ALT 637 FOR MEDICARE OP): Performed by: NURSE PRACTITIONER

## 2024-10-05 PROCEDURE — 94640 AIRWAY INHALATION TREATMENT: CPT

## 2024-10-05 PROCEDURE — 2500000002 HC RX 250 W HCPCS SELF ADMINISTERED DRUGS (ALT 637 FOR MEDICARE OP, ALT 636 FOR OP/ED): Performed by: NURSE PRACTITIONER

## 2024-10-05 PROCEDURE — 99233 SBSQ HOSP IP/OBS HIGH 50: CPT | Performed by: FAMILY MEDICINE

## 2024-10-05 PROCEDURE — 80048 BASIC METABOLIC PNL TOTAL CA: CPT | Performed by: NURSE PRACTITIONER

## 2024-10-05 PROCEDURE — 97165 OT EVAL LOW COMPLEX 30 MIN: CPT | Mod: GO

## 2024-10-05 PROCEDURE — 85027 COMPLETE CBC AUTOMATED: CPT | Performed by: NURSE PRACTITIONER

## 2024-10-05 PROCEDURE — 36415 COLL VENOUS BLD VENIPUNCTURE: CPT | Performed by: NURSE PRACTITIONER

## 2024-10-05 PROCEDURE — 82947 ASSAY GLUCOSE BLOOD QUANT: CPT

## 2024-10-05 PROCEDURE — 97530 THERAPEUTIC ACTIVITIES: CPT | Mod: GP

## 2024-10-05 PROCEDURE — 2500000004 HC RX 250 GENERAL PHARMACY W/ HCPCS (ALT 636 FOR OP/ED): Performed by: NURSE PRACTITIONER

## 2024-10-05 ASSESSMENT — COGNITIVE AND FUNCTIONAL STATUS - GENERAL
PERSONAL GROOMING: A LOT
DRESSING REGULAR UPPER BODY CLOTHING: A LITTLE
STANDING UP FROM CHAIR USING ARMS: A LOT
EATING MEALS: A LITTLE
TOILETING: A LOT
HELP NEEDED FOR BATHING: A LOT
DRESSING REGULAR LOWER BODY CLOTHING: TOTAL
DRESSING REGULAR UPPER BODY CLOTHING: A LITTLE
TOILETING: A LOT
MOVING FROM LYING ON BACK TO SITTING ON SIDE OF FLAT BED WITH BEDRAILS: A LOT
CLIMB 3 TO 5 STEPS WITH RAILING: TOTAL
TURNING FROM BACK TO SIDE WHILE IN FLAT BAD: A LITTLE
HELP NEEDED FOR BATHING: A LOT
MOVING FROM LYING ON BACK TO SITTING ON SIDE OF FLAT BED WITH BEDRAILS: A LITTLE
MOVING TO AND FROM BED TO CHAIR: A LOT
PERSONAL GROOMING: A LITTLE
CLIMB 3 TO 5 STEPS WITH RAILING: TOTAL
TURNING FROM BACK TO SIDE WHILE IN FLAT BAD: A LOT
DAILY ACTIVITIY SCORE: 12
WALKING IN HOSPITAL ROOM: TOTAL
WALKING IN HOSPITAL ROOM: TOTAL
MOVING TO AND FROM BED TO CHAIR: A LOT
CLIMB 3 TO 5 STEPS WITH RAILING: TOTAL
MOVING TO AND FROM BED TO CHAIR: A LOT
STANDING UP FROM CHAIR USING ARMS: TOTAL
PERSONAL GROOMING: A LOT
MOBILITY SCORE: 11
MOBILITY SCORE: 11
WALKING IN HOSPITAL ROOM: A LOT
DAILY ACTIVITIY SCORE: 14
MOBILITY SCORE: 11
TURNING FROM BACK TO SIDE WHILE IN FLAT BAD: A LITTLE
STANDING UP FROM CHAIR USING ARMS: TOTAL
DAILY ACTIVITIY SCORE: 14
DRESSING REGULAR UPPER BODY CLOTHING: A LOT
DRESSING REGULAR LOWER BODY CLOTHING: TOTAL
DRESSING REGULAR LOWER BODY CLOTHING: TOTAL
TOILETING: TOTAL
HELP NEEDED FOR BATHING: A LOT
MOVING FROM LYING ON BACK TO SITTING ON SIDE OF FLAT BED WITH BEDRAILS: A LITTLE

## 2024-10-05 ASSESSMENT — PAIN DESCRIPTION - LOCATION
LOCATION: FOOT
LOCATION: PELVIS

## 2024-10-05 ASSESSMENT — PAIN SCALES - GENERAL
PAINLEVEL_OUTOF10: 0 - NO PAIN
PAINLEVEL_OUTOF10: 7
PAINLEVEL_OUTOF10: 0 - NO PAIN
PAINLEVEL_OUTOF10: 5 - MODERATE PAIN
PAINLEVEL_OUTOF10: 7
PAINLEVEL_OUTOF10: 4

## 2024-10-05 ASSESSMENT — ACTIVITIES OF DAILY LIVING (ADL)
ADL_ASSISTANCE: INDEPENDENT
ADL_ASSISTANCE: INDEPENDENT
BATHING_ASSISTANCE: MAXIMAL

## 2024-10-05 ASSESSMENT — PAIN - FUNCTIONAL ASSESSMENT
PAIN_FUNCTIONAL_ASSESSMENT: 0-10

## 2024-10-05 ASSESSMENT — PAIN DESCRIPTION - ORIENTATION: ORIENTATION: LEFT

## 2024-10-05 NOTE — PROGRESS NOTES
Funmilayo Montenegro is a 79 y.o. female on day 1 of admission presenting with Pelvic ring fracture, closed, initial encounter (Multi).      Subjective   79 y.o. female with PMHx s/f hypertension atherosclerotic heart disease, high per lipidemia, diabetes type 2, tobacco dependence, COPD, presenting after a fall with pelvic pain.  Patient sustained a fall last night while trying to change her close.  Patient was not presyncopal she did not pass out she did not hit her head.  Patient had severe pain in the groin area is unable to ambulate and come to hospital for further evaluation.  Presenting to emergency department vital signs show temperature 98.2 heart rate 92 respiratory rate 20 blood pressure 158/82 SpO2 88% on room air.  Chemistry panel shows glucose 380 sodium 135 remainder of chemistry is within normal limits including creatinine kinase liver enzymes and BN peptide.  INR is 1.1.  CBC shows leukocytosis with white blood cell count 18 hemoglobin 14.2 hematocrit 42.8 platelets 291.  Chest x-ray appears to show pulmonary vascular indistinctness along with hazy bibasilar infiltrates suggesting possible pulmonary edema CT of the pelvis shows acute nondisplaced fractures of the anterior pelvic arch CT of the head shows no acute intracranial process CT of the neck is without acute fracture or subluxation x-ray of the left hip shows no acute fracture or subluxation.  She was given 2 doses of IV morphine for pain was placed on supplemental oxygen initially her oxygen saturation was 88% on room air.  She had good relief of her pain and oxygen saturation normalized.  Patient is not typically oxygen dependent.  She is not experiencing any fevers chills she denies any cough shortness of breath or sputum production.  The ED provider discussed the findings with orthopedic on-call who felt patient most likely would not require surgery but will consult on patient in hospital the patient is admitted to the hospital for pain  management and to start mobilization as well as to obtain full orthopedic consultation       10/5:              Today patient found awake alert and interactive appropriately.  Continues to complain of expected pain from pelvic fracture.   Orthopedic surgery describes toe-touch with walker and follow-up in 2 weeks.  Family present at the time of visit and patient relates falling 3 to 4 weeks ago and once before that with no apparent injuries.       Objective     Last Recorded Vitals  /58 (BP Location: Right arm, Patient Position: Lying)   Pulse 82   Temp 36.4 °C (97.5 °F) (Temporal)   Resp 17   Wt 56.8 kg (125 lb 3.5 oz)   SpO2 92%   Intake/Output last 3 Shifts:    Intake/Output Summary (Last 24 hours) at 10/5/2024 1812  Last data filed at 10/5/2024 1721  Gross per 24 hour   Intake 1414 ml   Output 850 ml   Net 564 ml       Admission Weight  Weight: 56.8 kg (125 lb 3.5 oz) (10/04/24 0730)    Daily Weight  10/04/24 : 56.8 kg (125 lb 3.5 oz)    Image Results  ECG 12 lead  Sinus rhythm  Borderline left axis deviation    See ED provider note for full interpretation and clinical correlation  Confirmed by Abi Nielsen ([id number]) on 10/5/2024 11:06:43 AM      Physical Exam  Constitutional:       General: She is not in acute distress.     Appearance: Normal appearance.      Comments:  HENT:      Head: Normocephalic and atraumatic.      Right Ear: External ear normal.      Left Ear: External ear normal.      Nose: Nose normal.      Mouth/Throat:      Mouth: Mucous membranes are moist.      Pharynx: Oropharynx is clear.   Eyes:      General: No scleral icterus.     Extraocular Movements: Extraocular movements intact.      Conjunctiva/sclera: Conjunctivae normal.      Pupils: Pupils are equal, round, and reactive to light.   Neck:      Vascular: No carotid bruit.   Cardiovascular:      Rate and Rhythm: Normal rate and regular rhythm.      Pulses: Normal pulses.      Heart sounds: Normal heart sounds.   Pulmonary:       Effort: Pulmonary effort is normal. No respiratory distress.      Breath sounds: Normal breath sounds. No wheezing, rhonchi or rales.   Chest:      Chest wall: No tenderness.   Abdominal:      General: Bowel sounds are normal. There is no distension.      Palpations: Abdomen is soft. There is no mass.      Tenderness: There is no abdominal tenderness. There is no rebound.   Musculoskeletal:         General: No swelling or deformity. Normal range of motion.      Cervical back: Normal range of motion.      Right lower leg: No edema.      Left lower leg: No edema.   Skin:     General: Skin is warm and dry.      Capillary Refill: Capillary refill takes less than 2 seconds.      Findings: No erythema, lesion or rash.   Neurological:      General: No focal deficit present.      Mental Status: She is alert and oriented to person, place, and time.      Cranial Nerves: No cranial nerve deficit.      Sensory: No sensory deficit.   Psychiatric:         Mood and Affect: Mood normal.         Behavior: Behavior normal.         Relevant Results               Assessment/Plan                  Assessment & Plan  Pelvic ring fracture, closed, initial encounter (Multi)    COPD (chronic obstructive pulmonary disease) (Multi)    Coronary arteriosclerosis    Primary hypertension    Mixed hyperlipidemia    Cigarette nicotine dependence with nicotine-induced disorder    Type 2 diabetes mellitus without complication, without long-term current use of insulin (Multi)    S/P fall w pelvic fracture  Pt did not syncopize  Ortho to see pt Dr Boland  Continue analgesia, vte prophylaxis PT/OT  10/5: Orthopedic describes toe-touch with walker and follow-up 2 weeks.  Awaiting PT/OT evaluations.     DM2  Carbohydrate controlled diet,   Sliding scale insulin, hold long acting hypoglycemics     HLD  Continue home statin     HTN  Will reconcile home meds     CAD  Pt w/o chest pain  Continue asa/statin/amlodipine/nitrates and beta blocker     COPD  No  increase in cough or sputum or shortness of breath but appears to be requiring oxygen  Unclear if this is due to morphine,   Nothing on physical exam consistent with pneumonia however chest x-ray appears to show increased density in the bases  BN peptide is normal  Will continue home meds  Add I/S and prn duoneb     Leukocytosis  Patient is without complaints of purulent sputum production fever no wounds no rashes no urinary symptoms and the UA is negative  The chest x-ray does have a slightly abnormal appearance but physical exam is not consistent with pneumonia  Review of the patient's old lab results shows that there is a persistent leukocytosis also this may be reactive due to her acute pain and fall  Will continue to monitor the patient, check a procalcitonin level consider CT of the chest if not improved in the morning  10/5: WBC trending down.  Continue to monitor.     Tobacco dependence  Nicotine patch                    Samy Concepcion MD

## 2024-10-05 NOTE — PROGRESS NOTES
Physical Therapy    Physical Therapy Evaluation    Patient Name: Funmilayo Montenegro  MRN: [id number]  Department: [facility] 3 E  Room: [address] [place], [place] [address]  Today's Date: 10/5/2024        Assessment/Plan   PT Assessment  PT Assessment Results: Decreased strength, Decreased range of motion, Decreased endurance, Impaired balance, Decreased mobility, Pain, Orthopedic restrictions  Rehab Prognosis: Good  Evaluation/Treatment Tolerance: Patient tolerated treatment well, Patient limited by pain  Barriers to Participation: Comorbidities  End of Session Communication: Bedside nurse  Assessment Comment: Pt presents with decreased strength, endurance, balance and increased pain limiting her toleranceand safety to transfers, ambulation and all functional mobility. She would benefit from skilled PT services to address deficits and restore to PLOF, while maintaining safety during fracture healing.  End of Session Patient Position: Bed, 3 rail up, Alarm on  IP OR SWING BED PT PLAN  Inpatient or Swing Bed: Inpatient  PT Plan  Treatment/Interventions: Bed mobility, Transfer training, Gait training, Balance training, Neuromuscular re-education, Endurance training, Strengthening, Range of motion, Therapeutic exercise, Therapeutic activity  PT Plan: Ongoing PT  PT Frequency: 4 times per week  PT Discharge Recommendations: Moderate intensity level of continued care      Subjective   General Visit Information:  General  Reason for Referral: impaired Mobility  Referred By: Xi  Past Medical History Relevant to Rehab: osteoporosis, tobacco use, DM2, COPD (no supplemental O2 at baseline), HTN, heart disease  Missed Visit: Yes  Missed Visit Reason: Other (Comment) (x3: first waiting pain meds at 910, just got cleaned up and repositioned in bed at 1040, physician in room at 1201.)  Family/Caregiver Present: Yes  Caregiver Feedback: positive support, interested in communicating with SW about SNF placement  Co-Treatment: OT  Co-Treatment Reason: safeety  and to optimize functional mobility assessment  Prior to Session Communication: Bedside nurse  Patient Position Received: Bed, 3 rail up, Alarm on  General Comment: room 3308: pt is agreeable to mobility, but concerned about pain levels in L hip when she tries to move  Home Living:  Home Living  Type of Home: House  Lives With: Grandchildren (Grandson and 3 great grandchildren (age 2-9 y/o))  Home Adaptive Equipment: Walker rolling or standard, Cane  Home Layout: One level  Home Access: Ramped entrance  Bathroom Shower/Tub: Walk-in shower  Bathroom Toilet: Standard  Bathroom Equipment: Shower chair with back  Prior Level of Function:  Prior Function Per Pt/Caregiver Report  Level of [place]: Independent with ADLs and functional transfers  Receives Help From: Family  ADL Assistance: Independent  Homemaking Assistance: Needs assistance (pt does most of the cooking and some cleaning but gets help from family for maority of the household cleaning)  Ambulatory Assistance: Needs assistance (furniture walking or cane)  Prior Function Comments: (-) drive, 1 other fall a few months ago walking into the house; grandson had to help her up off the floor.  Precautions:  Precautions  LE Weight Bearing Status: Left Toe-Touch Weight Bearing  Medical Precautions: Fall precautions    Vital Signs (Past 2hrs)        Date/Time Vitals Session Patient Position Pulse Resp SpO2 BP MAP (mmHg)    10/05/24 1303 --  --  76  18  --  104/50  68                         Objective   Pain:  Pain Assessment  Pain Assessment: 0-10  0-10 (Numeric) Pain Score: 0 - No pain (pt states pain shoots to 10/10 with movement)  Pain Type: Acute pain  Pain Location: Hip  Pain Orientation: Left  Pain Interventions: Repositioned, Ambulation/increased activity, Distraction  Cognition:  Cognition  Overall Cognitive Status: Within Functional Limits  Orientation Level: Oriented X4    General Assessments:  General Observation  General Observation: pleasant and  cooperative throughout mobility assessment               Activity Tolerance  Endurance: Decreased tolerance for upright activites    Sensation  Light Touch: No apparent deficits (tingling L lateral foot occasionally)    Strength  Strength Comments: no formal assessment L hip; RLE grossly 4/5, L knee/ankle 4/5  Strength  Strength Comments: no formal assessment L hip; RLE grossly 4/5, L knee/ankle 4/5           Coordination  Movements are Fluid and Coordinated: Yes    Postural Control  Postural Control: Within Functional Limits    Static Sitting Balance  Static Sitting-Balance Support: Bilateral upper extremity supported, Feet unsupported  Static Sitting-Level of Assistance: Contact guard  Static Sitting-Comment/Number of Minutes: 3 min       Functional Assessments:  Bed Mobility  Bed Mobility: Yes  Bed Mobility 1  Bed Mobility 1: Supine to sitting, Sitting to supine  Level of Assistance 1: Maximum assistance, +2  Bed Mobility Comments 1: draw sheet assist    Transfers  Transfer: Yes  Transfer 1  Technique 1: Sit to stand, Stand to sit  Transfer Level of Assistance 1: Moderate assistance, +2    Ambulation/Gait Training  Ambulation/Gait Training Performed: Yes  Ambulation/Gait Training 1  Device 1: Rolling walker  Assistance 1: Moderate assistance, Maximum verbal cues (x2)  Quality of Gait 1: Shuffling gait (fair compliance with TTWB LLE precaution with MAX cueing throughout)  Comments/Distance (ft) 1: 3 R side steps to HOB    Stairs  Stairs: No  Extremity/Trunk Assessments:  RLE   RLE : Within Functional Limits  LLE   LLE : Exceptions to WFL (minimal L hip AROM all planes. knee and ankle WFL)  Outcome Measures:  [facility] Basic Mobility  Turning from your back to your side while in a flat bed without using bedrails: A lot  Moving from lying on your back to sitting on the side of a flat bed without using bedrails: A lot  Moving to and from bed to chair (including a wheelchair): A lot  Standing up from a chair using your  arms (e.g. wheelchair or bedside chair): A lot  To walk in hospital room: A lot  Climbing 3-5 steps with railing: Total  Basic Mobility - Total Score: 11    Encounter Problems       Encounter Problems (Active)       Mobility       STG - Patient will ambulate 15ft with FWW and Min A (Progressing)       Start:  10/05/24    Expected End:  10/19/24               PT Transfers       STG - Patient will perform bed mobility Mod A x1 (Progressing)       Start:  10/05/24    Expected End:  10/19/24            STG - Patient will transfer sit to and from stand using FWW and Min A x1 (Progressing)       Start:  10/05/24    Expected End:  10/19/24            STG - Patient maintains weight bearing status during transfers (including STS, gait and bed/chair) (Progressing)       Start:  10/05/24    Expected End:  10/19/24               Pain - Adult              Education Documentation  Precautions, taught by Lila Espinosa, PT at 10/5/2024  2:09 PM.  Learner: Family, Patient  Readiness: Eager  Method: Explanation  Response: Verbalizes Understanding    Mobility Training, taught by Lila Espinosa, PT at 10/5/2024  2:09 PM.  Learner: Family, Patient  Readiness: Eager  Method: Explanation  Response: Verbalizes Understanding    Education Comments  No comments found.

## 2024-10-05 NOTE — CARE PLAN
The patient's goals for the shift include      The clinical goals for the shift include maintain patient safety      Problem: Skin  Goal: Decreased wound size/increased tissue granulation at next dressing change  Outcome: Progressing  Flowsheets (Taken 10/5/2024 1111)  Decreased wound size/increased tissue granulation at next dressing change: Promote sleep for wound healing  Goal: Participates in plan/prevention/treatment measures  Outcome: Progressing  Flowsheets (Taken 10/5/2024 1111)  Participates in plan/prevention/treatment measures: Elevate heels  Goal: Prevent/manage excess moisture  Outcome: Progressing  Flowsheets (Taken 10/5/2024 1111)  Prevent/manage excess moisture: Moisturize dry skin  Goal: Prevent/minimize sheer/friction injuries  Outcome: Progressing  Flowsheets (Taken 10/5/2024 1111)  Prevent/minimize sheer/friction injuries: Use pull sheet  Goal: Promote/optimize nutrition  Outcome: Progressing  Flowsheets (Taken 10/5/2024 1111)  Promote/optimize nutrition: Assist with feeding  Goal: Promote skin healing  Outcome: Progressing  Flowsheets (Taken 10/5/2024 1111)  Promote skin healing: Rotate device position/do not position patient on device     Problem: Pain - Adult  Goal: Verbalizes/displays adequate comfort level or baseline comfort level  Outcome: Progressing     Problem: Safety - Adult  Goal: Free from fall injury  Outcome: Progressing     Problem: Discharge Planning  Goal: Discharge to home or other facility with appropriate resources  Outcome: Progressing     Problem: Chronic Conditions and Co-morbidities  Goal: Patient's chronic conditions and co-morbidity symptoms are monitored and maintained or improved  Outcome: Progressing     Problem: Fall/Injury  Goal: Not fall by end of shift  Outcome: Progressing  Goal: Be free from injury by end of the shift  Outcome: Progressing  Goal: Verbalize understanding of personal risk factors for fall in the hospital  Outcome: Progressing  Goal: Verbalize  understanding of risk factor reduction measures to prevent injury from fall in the home  Outcome: Progressing  Goal: Use assistive devices by end of the shift  Outcome: Progressing  Goal: Pace activities to prevent fatigue by end of the shift  Outcome: Progressing     Problem: Pain  Goal: Takes deep breaths with improved pain control throughout the shift  Outcome: Progressing  Goal: Turns in bed with improved pain control throughout the shift  Outcome: Progressing  Goal: Walks with improved pain control throughout the shift  Outcome: Progressing  Goal: Performs ADL's with improved pain control throughout shift  Outcome: Progressing  Goal: Participates in PT with improved pain control throughout the shift  Outcome: Progressing  Goal: Free from opioid side effects throughout the shift  Outcome: Progressing  Goal: Free from acute confusion related to pain meds throughout the shift  Outcome: Progressing     Problem: Nutrition  Goal: BG  mg/dL  Outcome: Progressing  Goal: Lab values WNL  Outcome: Progressing  Goal: Electrolytes WNL  Outcome: Progressing  Goal: Promote healing  Outcome: Progressing     Problem: Diabetes  Goal: Achieve decreasing blood glucose levels by end of shift  Outcome: Progressing  Goal: Increase stability of blood glucose readings by end of shift  Outcome: Progressing  Goal: Decrease in ketones present in urine by end of shift  Outcome: Progressing  Goal: Maintain electrolyte levels within acceptable range throughout shift  Outcome: Progressing  Goal: Maintain glucose levels >70mg/dl to <250mg/dl throughout shift  Outcome: Progressing  Goal: No changes in neurological exam by end of shift  Outcome: Progressing  Goal: Learn about and adhere to nutrition recommendations by end of shift  Outcome: Progressing  Goal: Vital signs within normal range for age by end of shift  Outcome: Progressing  Goal: Increase self care and/or family involovement by end of shift  Outcome: Progressing  Goal: Receive  DSME education by end of shift  Outcome: Progressing

## 2024-10-05 NOTE — PROGRESS NOTES
Occupational Therapy    Evaluation    Patient Name: Funmilayo Montenegro  MRN: [id number]  Department: [facility] 3 E  Room: [facility]  Today's Date: 10/5/2024  Time Calculation  Start Time: 1316  Stop Time: 1343  Time Calculation (min): 27 min        Assessment:  OT Assessment: Pt is 79 year old female admitted for fall and pelvic fractures. Pt requiring 2 person assistance for mobility and ADLs. Pt would benefit from skilled OT services during hospital stay to address goals.  Prognosis: Good  Evaluation/Treatment Tolerance: Patient tolerated treatment well  End of Session Communication: Bedside nurse  End of Session Patient Position: Bed, 3 rail up, Alarm on  OT Assessment Results: Decreased ADL status, Decreased upper extremity strength, Decreased safe judgment during ADL, Decreased endurance, Decreased functional mobility, Decreased IADLs  Prognosis: Good  Evaluation/Treatment Tolerance: Patient tolerated treatment well  Plan:  Treatment Interventions: ADL retraining, Functional transfer training, UE strengthening/ROM, Endurance training, Patient/family training, Equipment evaluation/education, Compensatory technique education  OT Frequency: 3 times per week  OT Discharge Recommendations: Moderate intensity level of continued care  Equipment Recommended upon Discharge:  (TBD)  OT Recommended Transfer Status: Assist of 2  OT - OK to Discharge: Yes (OT evaluation complete and discharge recommendations documented)  Treatment Interventions: ADL retraining, Functional transfer training, UE strengthening/ROM, Endurance training, Patient/family training, Equipment evaluation/education, Compensatory technique education    Subjective   Current Problem:  1. Pelvic ring fracture, closed, initial encounter (Multi)          General:  General  Reason for Referral: Fall resulting in acute nondisplaced fractures of anterior cortex L superior pubic ramus and acetabular junction, L pubic symphysis, L inferior pubic ramus  Referred By:  Matchett  Past Medical History Relevant to Rehab: hypertension atherosclerotic heart disease, high per lipidemia, diabetes type 2, tobacco dependence, COPD  Missed Visit Reason:  (Attempted x3 this AM, unable to perform due to waiting pain meds, then recently re-positioned and then MD present at bedside)  Family/Caregiver Present: Yes  Caregiver Feedback: Daughter and granddaughter present during session  Co-Treatment: PT  Co-Treatment Reason: to optimize mobiliyt and safety while focusing on discipline specific needs  Prior to Session Communication: Bedside nurse  Patient Position Received: Bed, 3 rail up, Alarm on  General Comment: Pt supine with HOB elevated. Pt pleasant and cooperative with therapy.  Precautions:  LE Weight Bearing Status: Left Toe-Touch Weight Bearing  Medical Precautions: Fall precautions, Oxygen therapy device and L/min  Precautions Comment: 2L O2    Vital Sign (Past 2hrs)        Date/Time Vitals Session Patient Position Pulse Resp SpO2 BP MAP (mmHg)    10/05/24 1303 --  --  76  18  --  104/50  68                         Pain:  Pain Assessment  Pain Assessment: 0-10  0-10 (Numeric) Pain Score: 0 - No pain (at rest, increased to 10/10 in L hip with movement)    Objective   Cognition:  Overall Cognitive Status: Within Functional Limits  Orientation Level: Oriented X4           Home Living:  Type of Home: House  Lives With: Grandchildren (Grandson and 3 great-grandchildren (ages 2-8))  Home Adaptive Equipment: Walker rolling or standard, Cane  Home Layout: One level  Home Access: Ramped entrance  Bathroom Shower/Tub: Walk-in shower  Bathroom Toilet: Standard  Bathroom Equipment: Shower chair with back  Prior Function:  Level of [place]: Independent with ADLs and functional transfers  Receives Help From: Family  ADL Assistance: Independent  Homemaking Assistance: Needs assistance (able to perform cooking and some cleaning; Family assistance with additional household tasks)  Ambulatory  Assistance: Independent (MOD I with cane vs furniture walking)  Leisure: Enjoys watching tv  Prior Function Comments: (+) fall  IADL History:  IADL Comments: (-) drive  ADL:  Eating Assistance: Independent  Grooming Assistance: Stand by (Anticipated)  Bathing Assistance: Maximal (Anticipated)  UE Dressing Assistance: Moderate (Anticipated)  LE Dressing Assistance: Total (Anticipated)  Toileting Assistance with Device: Total (Anticipated)  Activity Tolerance:  Endurance: Decreased tolerance for upright activites  Bed Mobility/Transfers: Bed Mobility  Bed Mobility: Yes  Bed Mobility 1  Bed Mobility 1: Supine to sitting, Sitting to supine  Level of Assistance 1: Maximum assistance, +2  Bed Mobility Comments 1: TAPS system used    Transfers  Transfer: Yes  Transfer 1  Transfer From 1: Sit to, Stand to  Transfer to 1: Sit, Stand  Technique 1: Sit to stand, Stand to sit  Transfer Device 1: Walker  Transfer Level of Assistance 1: Moderate assistance, Minimal verbal cues, +2      Functional Mobility:  Functional Mobility  Functional Mobility Performed:  (Lateral steps toward HOB with MOD A x2, cuing to maintain weightbearing and integrate UE support on walker to maintain)  Sitting Balance:  Static Sitting Balance  Static Sitting-Balance Support: Bilateral upper extremity supported  Static Sitting-Level of Assistance: Minimum assistance  Dynamic Sitting Balance  Dynamic Sitting-Balance Support: Bilateral upper extremity supported  Dynamic Sitting-Level of Assistance: Moderate assistance  Standing Balance:  Static Standing Balance  Static Standing-Balance Support: Bilateral upper extremity supported  Static Standing-Level of Assistance: Moderate assistance (+2)  Dynamic Standing Balance  Dynamic Standing-Balance Support: Bilateral upper extremity supported  Dynamic Standing-Level of Assistance: Maximum assistance, Moderate assistance (+2)   Modalities:     Vision:Vision - Basic Assessment  Current Vision: Wears glasses all  the time  Sensation:  Light Touch: No apparent deficits  Sensation Comment: Tingling reported L lateral foot  Strength:  Strength Comments: BUE grossly 3+/5 through functional assessment  Perception:     Coordination:      Hand Function:  Gross Grasp: Functional  Coordination: Functional    Outcome Measures:[facility] Daily Activity  Putting on and taking off regular lower body clothing: Total  Bathing (including washing, rinsing, drying): A lot  Putting on and taking off regular upper body clothing: A lot  Toileting, which includes using toilet, bedpan or urinal: Total  Taking care of personal grooming such as brushing teeth: A little  Eating Meals: A little  Daily Activity - Total Score: 12        Education Documentation  Precautions, taught by Renetta Mendez OT at 10/5/2024  2:47 PM.  Learner: Patient  Readiness: Acceptance  Method: Explanation  Response: Verbalizes Understanding    ADL Training, taught by Renetta Mendez OT at 10/5/2024  2:47 PM.  Learner: Patient  Readiness: Acceptance  Method: Explanation  Response: Verbalizes Understanding    Education Comments  No comments found.        OP EDUCATION:       Goals:  Encounter Problems       Encounter Problems (Active)       ADLs       Patient will perform UB and LB bathing with minimal assist  level of assistance. (Progressing)       Start:  10/05/24    Expected End:  10/19/24            Patient with complete lower body dressing with minimal assist  level of assistance  with PRN adaptive equipment (Progressing)       Start:  10/05/24    Expected End:  10/19/24            Patient will complete daily grooming tasks  with modified independent level of assistance and PRN adaptive equipment. (Progressing)       Start:  10/05/24    Expected End:  10/19/24            Patient will complete toileting including hygiene clothing management/hygiene with minimal assist  level of assistance. (Progressing)       Start:  10/05/24    Expected End:  10/19/24                COGNITION/SAFETY       Patient will recall and adhere to weight bearing restrictions with all ADL and functional mobility in order to promote healing and safety with functional tasks (Progressing)       Start:  10/05/24    Expected End:  10/19/24               TRANSFERS       Patient will perform bed mobility minimal assist  level of assistance and bed rails in order to improve safety and independence with mobility (Progressing)       Start:  10/05/24    Expected End:  10/19/24            Patient will complete functional transfers with least restrictive device with contact guard assist level of assistance. (Progressing)       Start:  10/05/24    Expected End:  10/19/24

## 2024-10-05 NOTE — PROGRESS NOTES
Physical Therapy                 Therapy Communication Note    Patient Name: Funmilayo Montenegro  MRN: [id number]  Department: [facility] 3 E  Room: [address] [place], [place] [address]  Today's Date: 10/5/2024     Discipline: Physical Therapy    Missed Visit Reason: Missed Visit Reason: Other (Comment) (x3: first waiting pain meds at 910, just got cleaned up and repositioned in bed at 1040, physician in room at 1201.)    Missed Time: Attempt    Comment:

## 2024-10-06 VITALS
TEMPERATURE: 98.2 F | OXYGEN SATURATION: 93 % | SYSTOLIC BLOOD PRESSURE: 124 MMHG | HEART RATE: 79 BPM | RESPIRATION RATE: 18 BRPM | HEIGHT: 56 IN | DIASTOLIC BLOOD PRESSURE: 71 MMHG | WEIGHT: 125.22 LBS | BODY MASS INDEX: 28.17 KG/M2

## 2024-10-06 LAB
ANION GAP SERPL CALC-SCNC: 11 MMOL/L (ref 10–20)
BUN SERPL-MCNC: 27 MG/DL (ref 6–23)
CALCIUM SERPL-MCNC: 9 MG/DL (ref 8.6–10.3)
CHLORIDE SERPL-SCNC: 98 MMOL/L (ref 98–107)
CO2 SERPL-SCNC: 29 MMOL/L (ref 21–32)
CREAT SERPL-MCNC: 0.75 MG/DL (ref 0.5–1.05)
EGFRCR SERPLBLD CKD-EPI 2021: 81 ML/MIN/1.73M*2
ERYTHROCYTE [DISTWIDTH] IN BLOOD BY AUTOMATED COUNT: 16.1 % (ref 11.5–14.5)
GLUCOSE BLD MANUAL STRIP-MCNC: 263 MG/DL (ref 74–99)
GLUCOSE BLD MANUAL STRIP-MCNC: 343 MG/DL (ref 74–99)
GLUCOSE BLD MANUAL STRIP-MCNC: 360 MG/DL (ref 74–99)
GLUCOSE BLD MANUAL STRIP-MCNC: 461 MG/DL (ref 74–99)
GLUCOSE SERPL-MCNC: 248 MG/DL (ref 74–99)
HCT VFR BLD AUTO: 36.5 % (ref 36–46)
HGB BLD-MCNC: 12.2 G/DL (ref 12–16)
MCH RBC QN AUTO: 29.5 PG (ref 26–34)
MCHC RBC AUTO-ENTMCNC: 33.4 G/DL (ref 32–36)
MCV RBC AUTO: 88 FL (ref 80–100)
NRBC BLD-RTO: 0 /100 WBCS (ref 0–0)
PLATELET # BLD AUTO: 258 X10*3/UL (ref 150–450)
POTASSIUM SERPL-SCNC: 4.5 MMOL/L (ref 3.5–5.3)
RBC # BLD AUTO: 4.14 X10*6/UL (ref 4–5.2)
SODIUM SERPL-SCNC: 133 MMOL/L (ref 136–145)
WBC # BLD AUTO: 15.3 X10*3/UL (ref 4.4–11.3)

## 2024-10-06 PROCEDURE — 99233 SBSQ HOSP IP/OBS HIGH 50: CPT | Performed by: FAMILY MEDICINE

## 2024-10-06 PROCEDURE — 2500000002 HC RX 250 W HCPCS SELF ADMINISTERED DRUGS (ALT 637 FOR MEDICARE OP, ALT 636 FOR OP/ED): Performed by: NURSE PRACTITIONER

## 2024-10-06 PROCEDURE — 2500000004 HC RX 250 GENERAL PHARMACY W/ HCPCS (ALT 636 FOR OP/ED): Performed by: NURSE PRACTITIONER

## 2024-10-06 PROCEDURE — S4991 NICOTINE PATCH NONLEGEND: HCPCS | Performed by: NURSE PRACTITIONER

## 2024-10-06 PROCEDURE — 80048 BASIC METABOLIC PNL TOTAL CA: CPT | Performed by: FAMILY MEDICINE

## 2024-10-06 PROCEDURE — 2500000002 HC RX 250 W HCPCS SELF ADMINISTERED DRUGS (ALT 637 FOR MEDICARE OP, ALT 636 FOR OP/ED): Performed by: STUDENT IN AN ORGANIZED HEALTH CARE EDUCATION/TRAINING PROGRAM

## 2024-10-06 PROCEDURE — 2500000001 HC RX 250 WO HCPCS SELF ADMINISTERED DRUGS (ALT 637 FOR MEDICARE OP): Performed by: NURSE PRACTITIONER

## 2024-10-06 PROCEDURE — 85027 COMPLETE CBC AUTOMATED: CPT | Performed by: FAMILY MEDICINE

## 2024-10-06 PROCEDURE — 82947 ASSAY GLUCOSE BLOOD QUANT: CPT

## 2024-10-06 PROCEDURE — 1200000002 HC GENERAL ROOM WITH TELEMETRY DAILY

## 2024-10-06 PROCEDURE — 94640 AIRWAY INHALATION TREATMENT: CPT

## 2024-10-06 PROCEDURE — 36415 COLL VENOUS BLD VENIPUNCTURE: CPT | Performed by: FAMILY MEDICINE

## 2024-10-06 RX ORDER — INSULIN LISPRO 100 [IU]/ML
10 INJECTION, SOLUTION INTRAVENOUS; SUBCUTANEOUS ONCE
Status: COMPLETED | OUTPATIENT
Start: 2024-10-06 | End: 2024-10-06

## 2024-10-06 ASSESSMENT — COGNITIVE AND FUNCTIONAL STATUS - GENERAL
DAILY ACTIVITIY SCORE: 14
DRESSING REGULAR UPPER BODY CLOTHING: A LITTLE
TURNING FROM BACK TO SIDE WHILE IN FLAT BAD: A LITTLE
MOBILITY SCORE: 11
MOBILITY SCORE: 11
CLIMB 3 TO 5 STEPS WITH RAILING: TOTAL
MOVING FROM LYING ON BACK TO SITTING ON SIDE OF FLAT BED WITH BEDRAILS: A LITTLE
MOVING TO AND FROM BED TO CHAIR: A LOT
MOVING FROM LYING ON BACK TO SITTING ON SIDE OF FLAT BED WITH BEDRAILS: A LITTLE
PERSONAL GROOMING: A LOT
WALKING IN HOSPITAL ROOM: TOTAL
MOVING TO AND FROM BED TO CHAIR: A LOT
CLIMB 3 TO 5 STEPS WITH RAILING: TOTAL
HELP NEEDED FOR BATHING: A LOT
STANDING UP FROM CHAIR USING ARMS: TOTAL
HELP NEEDED FOR BATHING: A LOT
TOILETING: A LOT
TOILETING: A LOT
DRESSING REGULAR LOWER BODY CLOTHING: TOTAL
WALKING IN HOSPITAL ROOM: TOTAL
PERSONAL GROOMING: A LOT
STANDING UP FROM CHAIR USING ARMS: TOTAL
DAILY ACTIVITIY SCORE: 14
DRESSING REGULAR UPPER BODY CLOTHING: A LITTLE
DRESSING REGULAR LOWER BODY CLOTHING: TOTAL
TURNING FROM BACK TO SIDE WHILE IN FLAT BAD: A LITTLE

## 2024-10-06 ASSESSMENT — PAIN DESCRIPTION - ORIENTATION: ORIENTATION: LEFT

## 2024-10-06 ASSESSMENT — PAIN DESCRIPTION - LOCATION
LOCATION: HIP
LOCATION: HIP

## 2024-10-06 ASSESSMENT — PAIN - FUNCTIONAL ASSESSMENT
PAIN_FUNCTIONAL_ASSESSMENT: 0-10
PAIN_FUNCTIONAL_ASSESSMENT: 0-10

## 2024-10-06 ASSESSMENT — PAIN SCALES - GENERAL
PAINLEVEL_OUTOF10: 6
PAINLEVEL_OUTOF10: 6
PAINLEVEL_OUTOF10: 3
PAINLEVEL_OUTOF10: 0 - NO PAIN

## 2024-10-06 NOTE — PROGRESS NOTES
Funmilayo Montenegro is a 79 y.o. female on day 2 of admission presenting with Pelvic ring fracture, closed, initial encounter (Multi).      Subjective   79 y.o. female with PMHx s/f hypertension atherosclerotic heart disease, high per lipidemia, diabetes type 2, tobacco dependence, COPD, presenting after a fall with pelvic pain.  Patient sustained a fall last night while trying to change her close.  Patient was not presyncopal she did not pass out she did not hit her head.  Patient had severe pain in the groin area is unable to ambulate and come to hospital for further evaluation.  Presenting to emergency department vital signs show temperature 98.2 heart rate 92 respiratory rate 20 blood pressure 158/82 SpO2 88% on room air.  Chemistry panel shows glucose 380 sodium 135 remainder of chemistry is within normal limits including creatinine kinase liver enzymes and BN peptide.  INR is 1.1.  CBC shows leukocytosis with white blood cell count 18 hemoglobin 14.2 hematocrit 42.8 platelets 291.  Chest x-ray appears to show pulmonary vascular indistinctness along with hazy bibasilar infiltrates suggesting possible pulmonary edema CT of the pelvis shows acute nondisplaced fractures of the anterior pelvic arch CT of the head shows no acute intracranial process CT of the neck is without acute fracture or subluxation x-ray of the left hip shows no acute fracture or subluxation.  She was given 2 doses of IV morphine for pain was placed on supplemental oxygen initially her oxygen saturation was 88% on room air.  She had good relief of her pain and oxygen saturation normalized.  Patient is not typically oxygen dependent.  She is not experiencing any fevers chills she denies any cough shortness of breath or sputum production.  The ED provider discussed the findings with orthopedic on-call who felt patient most likely would not require surgery but will consult on patient in hospital the patient is admitted to the hospital for pain  management and to start mobilization as well as to obtain full orthopedic consultation       10/5:              Today patient found awake alert and interactive appropriately.  Continues to complain of expected pain from pelvic fracture.   Orthopedic surgery describes toe-touch with walker and follow-up in 2 weeks.  Family present at the time of visit and patient relates falling 3 to 4 weeks ago and once before that with no apparent injuries.    10/6:              Today patient remains awake alert interactive appropriately.  Continues to complain of expected pelvic fracture pain and poor energy.  WBC remains mildly elevated likely reactive in nature.  Continues on nasal cannula oxygen likely related to her immobility from pain.  Physical therapy describes AM-PAC of 11 and awaiting transitional care for likely discharge to SNF.  Otherwise denies interval new symptoms or complaints including chest pain palpitations pleuritic type pain worsening shortness of breath cough sputum nausea abdominal pain flank pain diarrhea fever or chills.       Objective     Last Recorded Vitals  /62 (BP Location: Right arm, Patient Position: Lying)   Pulse 73   Temp 36.4 °C (97.6 °F) (Temporal)   Resp 17   Wt 56.8 kg (125 lb 3.5 oz)   SpO2 90%   Intake/Output last 3 Shifts:    Intake/Output Summary (Last 24 hours) at 10/6/2024 1638  Last data filed at 10/6/2024 1547  Gross per 24 hour   Intake 1457 ml   Output 1650 ml   Net -193 ml       Admission Weight  Weight: 56.8 kg (125 lb 3.5 oz) (10/04/24 0730)    Daily Weight  10/04/24 : 56.8 kg (125 lb 3.5 oz)    Image Results  ECG 12 lead  Sinus rhythm  Borderline left axis deviation    See ED provider note for full interpretation and clinical correlation  Confirmed by Abi Nielsen ([id number]) on 10/5/2024 11:06:43 AM      Physical Exam  Constitutional:       General: She is not in acute distress.     Appearance: Normal appearance.      Comments:  HENT:      Head: Normocephalic and  atraumatic.      Right Ear: External ear normal.      Left Ear: External ear normal.      Nose: Nose normal.      Mouth/Throat:      Mouth: Mucous membranes are moist.      Pharynx: Oropharynx is clear.   Eyes:      General: No scleral icterus.     Extraocular Movements: Extraocular movements intact.      Conjunctiva/sclera: Conjunctivae normal.      Pupils: Pupils are equal, round, and reactive to light.   Neck:      Vascular: No carotid bruit.   Cardiovascular:      Rate and Rhythm: Normal rate and regular rhythm.      Pulses: Normal pulses.      Heart sounds: Normal heart sounds.   Pulmonary:      Effort: Pulmonary effort is normal. No respiratory distress.      Breath sounds: Normal breath sounds. No wheezing, rhonchi or rales.   Chest:      Chest wall: No tenderness.   Abdominal:      General: Bowel sounds are normal. There is no distension.      Palpations: Abdomen is soft. There is no mass.      Tenderness: There is no abdominal tenderness. There is no rebound.   Musculoskeletal:         General: No swelling or deformity. Normal range of motion.      Cervical back: Normal range of motion.      Right lower leg: No edema.      Left lower leg: No edema.   Skin:     General: Skin is warm and dry.      Capillary Refill: Capillary refill takes less than 2 seconds.      Findings: No erythema, lesion or rash.   Neurological:      General: No focal deficit present.      Mental Status: She is alert and oriented to person, place, and time.      Cranial Nerves: No cranial nerve deficit.      Sensory: No sensory deficit.   Psychiatric:         Mood and Affect: Mood normal.         Behavior: Behavior normal.         Relevant Results               Assessment/Plan                  Assessment & Plan  Pelvic ring fracture, closed, initial encounter (Multi)    COPD (chronic obstructive pulmonary disease) (Multi)    Coronary arteriosclerosis    Primary hypertension    Mixed hyperlipidemia    Cigarette nicotine dependence with  nicotine-induced disorder    Type 2 diabetes mellitus without complication, without long-term current use of insulin (Multi)    S/P fall w pelvic fracture  Pt did not syncopize  Ortho to see pt Dr Boland  Continue analgesia, vte prophylaxis PT/OT  10/5: Orthopedic describes toe-touch with walker and follow-up 2 weeks.  Awaiting PT/OT evaluations.     DM2  Carbohydrate controlled diet,   Sliding scale insulin, hold long acting hypoglycemics     HLD  Continue home statin     HTN  Will reconcile home meds     CAD  Pt w/o chest pain  Continue asa/statin/amlodipine/nitrates and beta blocker     COPD  No increase in cough or sputum or shortness of breath but appears to be requiring oxygen  Unclear if this is due to morphine,   Nothing on physical exam consistent with pneumonia however chest x-ray appears to show increased density in the bases  BN peptide is normal  Will continue home meds  Add I/S and prn duoneb     Leukocytosis  Patient is without complaints of purulent sputum production fever no wounds no rashes no urinary symptoms and the UA is negative  The chest x-ray does have a slightly abnormal appearance but physical exam is not consistent with pneumonia  Review of the patient's old lab results shows that there is a persistent leukocytosis also this may be reactive due to her acute pain and fall  Will continue to monitor the patient, check a procalcitonin level consider CT of the chest if not improved in the morning  10/5: WBC trending down.    10/6: WBC remains mildly elevated likely reactive in nature.  Continue to monitor.     Tobacco dependence  Nicotine patch                    Samy Concepcion MD

## 2024-10-06 NOTE — CARE PLAN
Problem: Skin  Goal: Decreased wound size/increased tissue granulation at next dressing change  Outcome: Progressing  Flowsheets (Taken 10/6/2024 0725)  Decreased wound size/increased tissue granulation at next dressing change: Promote sleep for wound healing  Goal: Participates in plan/prevention/treatment measures  Outcome: Progressing  Flowsheets (Taken 10/6/2024 0725)  Participates in plan/prevention/treatment measures:   Discuss with provider PT/OT consult   Elevate heels   Increase activity/out of bed for meals  Goal: Prevent/manage excess moisture  Outcome: Progressing  Flowsheets (Taken 10/6/2024 0725)  Prevent/manage excess moisture:   Moisturize dry skin   Cleanse incontinence/protect with barrier cream  Goal: Prevent/minimize sheer/friction injuries  Outcome: Progressing  Flowsheets (Taken 10/6/2024 0725)  Prevent/minimize sheer/friction injuries:   Use pull sheet   Complete micro-shifts as needed if patient unable. Adjust patient position to relieve pressure points, not a full turn  Goal: Promote/optimize nutrition  Outcome: Progressing  Flowsheets (Taken 10/6/2024 0725)  Promote/optimize nutrition: Assist with feeding  Goal: Promote skin healing  Outcome: Progressing  Flowsheets (Taken 10/6/2024 0725)  Promote skin healing: Rotate device position/do not position patient on device     Problem: Pain - Adult  Goal: Verbalizes/displays adequate comfort level or baseline comfort level  Outcome: Progressing     Problem: Safety - Adult  Goal: Free from fall injury  Outcome: Progressing     Problem: Discharge Planning  Goal: Discharge to home or other facility with appropriate resources  Outcome: Progressing     Problem: Chronic Conditions and Co-morbidities  Goal: Patient's chronic conditions and co-morbidity symptoms are monitored and maintained or improved  Outcome: Progressing     Problem: Fall/Injury  Goal: Not fall by end of shift  Outcome: Progressing  Goal: Be free from injury by end of the  shift  Outcome: Progressing  Goal: Verbalize understanding of personal risk factors for fall in the hospital  Outcome: Progressing  Goal: Verbalize understanding of risk factor reduction measures to prevent injury from fall in the home  Outcome: Progressing  Goal: Use assistive devices by end of the shift  Outcome: Progressing  Goal: Pace activities to prevent fatigue by end of the shift  Outcome: Progressing     Problem: Pain  Goal: Takes deep breaths with improved pain control throughout the shift  Outcome: Progressing  Goal: Turns in bed with improved pain control throughout the shift  Outcome: Progressing  Goal: Walks with improved pain control throughout the shift  Outcome: Progressing  Goal: Performs ADL's with improved pain control throughout shift  Outcome: Progressing  Goal: Participates in PT with improved pain control throughout the shift  Outcome: Progressing  Goal: Free from opioid side effects throughout the shift  Outcome: Progressing  Goal: Free from acute confusion related to pain meds throughout the shift  Outcome: Progressing     Problem: Nutrition  Goal: BG  mg/dL  Outcome: Progressing  Goal: Lab values WNL  Outcome: Progressing  Goal: Electrolytes WNL  Outcome: Progressing  Goal: Promote healing  Outcome: Progressing     Problem: Diabetes  Goal: Achieve decreasing blood glucose levels by end of shift  Outcome: Progressing  Goal: Increase stability of blood glucose readings by end of shift  Outcome: Progressing  Goal: Decrease in ketones present in urine by end of shift  Outcome: Progressing  Goal: Maintain electrolyte levels within acceptable range throughout shift  Outcome: Progressing  Goal: Maintain glucose levels >70mg/dl to <250mg/dl throughout shift  Outcome: Progressing  Goal: No changes in neurological exam by end of shift  Outcome: Progressing  Goal: Learn about and adhere to nutrition recommendations by end of shift  Outcome: Progressing  Goal: Vital signs within normal range for  age by end of shift  Outcome: Progressing  Goal: Increase self care and/or family involovement by end of shift  Outcome: Progressing  Goal: Receive DSME education by end of shift  Outcome: Progressing    The patient's goals for the shift include      The clinical goals for the shift include Patient will remain free from falls and injury during shift

## 2024-10-07 LAB
ANION GAP SERPL CALC-SCNC: 12 MMOL/L (ref 10–20)
BUN SERPL-MCNC: 20 MG/DL (ref 6–23)
CALCIUM SERPL-MCNC: 9 MG/DL (ref 8.6–10.3)
CHLORIDE SERPL-SCNC: 99 MMOL/L (ref 98–107)
CO2 SERPL-SCNC: 27 MMOL/L (ref 21–32)
CREAT SERPL-MCNC: 0.62 MG/DL (ref 0.5–1.05)
EGFRCR SERPLBLD CKD-EPI 2021: >90 ML/MIN/1.73M*2
ERYTHROCYTE [DISTWIDTH] IN BLOOD BY AUTOMATED COUNT: 16.2 % (ref 11.5–14.5)
GLUCOSE BLD MANUAL STRIP-MCNC: 132 MG/DL (ref 74–99)
GLUCOSE BLD MANUAL STRIP-MCNC: 252 MG/DL (ref 74–99)
GLUCOSE BLD MANUAL STRIP-MCNC: 253 MG/DL (ref 74–99)
GLUCOSE BLD MANUAL STRIP-MCNC: 262 MG/DL (ref 74–99)
GLUCOSE BLD MANUAL STRIP-MCNC: 368 MG/DL (ref 74–99)
GLUCOSE SERPL-MCNC: 251 MG/DL (ref 74–99)
HCT VFR BLD AUTO: 37.4 % (ref 36–46)
HGB BLD-MCNC: 12.1 G/DL (ref 12–16)
MCH RBC QN AUTO: 29 PG (ref 26–34)
MCHC RBC AUTO-ENTMCNC: 32.4 G/DL (ref 32–36)
MCV RBC AUTO: 90 FL (ref 80–100)
NRBC BLD-RTO: 0 /100 WBCS (ref 0–0)
PLATELET # BLD AUTO: 237 X10*3/UL (ref 150–450)
POTASSIUM SERPL-SCNC: 4.3 MMOL/L (ref 3.5–5.3)
RBC # BLD AUTO: 4.17 X10*6/UL (ref 4–5.2)
SODIUM SERPL-SCNC: 134 MMOL/L (ref 136–145)
WBC # BLD AUTO: 16.3 X10*3/UL (ref 4.4–11.3)

## 2024-10-07 PROCEDURE — 94640 AIRWAY INHALATION TREATMENT: CPT

## 2024-10-07 PROCEDURE — 85027 COMPLETE CBC AUTOMATED: CPT | Performed by: FAMILY MEDICINE

## 2024-10-07 PROCEDURE — 2500000004 HC RX 250 GENERAL PHARMACY W/ HCPCS (ALT 636 FOR OP/ED): Performed by: NURSE PRACTITIONER

## 2024-10-07 PROCEDURE — 2500000002 HC RX 250 W HCPCS SELF ADMINISTERED DRUGS (ALT 637 FOR MEDICARE OP, ALT 636 FOR OP/ED): Performed by: NURSE PRACTITIONER

## 2024-10-07 PROCEDURE — 99233 SBSQ HOSP IP/OBS HIGH 50: CPT | Performed by: FAMILY MEDICINE

## 2024-10-07 PROCEDURE — S4991 NICOTINE PATCH NONLEGEND: HCPCS | Performed by: NURSE PRACTITIONER

## 2024-10-07 PROCEDURE — 2500000002 HC RX 250 W HCPCS SELF ADMINISTERED DRUGS (ALT 637 FOR MEDICARE OP, ALT 636 FOR OP/ED): Performed by: FAMILY MEDICINE

## 2024-10-07 PROCEDURE — 97530 THERAPEUTIC ACTIVITIES: CPT | Mod: GP,CQ

## 2024-10-07 PROCEDURE — 1200000002 HC GENERAL ROOM WITH TELEMETRY DAILY

## 2024-10-07 PROCEDURE — 97530 THERAPEUTIC ACTIVITIES: CPT | Mod: GO,CO

## 2024-10-07 PROCEDURE — 80048 BASIC METABOLIC PNL TOTAL CA: CPT | Performed by: FAMILY MEDICINE

## 2024-10-07 PROCEDURE — 36415 COLL VENOUS BLD VENIPUNCTURE: CPT | Performed by: FAMILY MEDICINE

## 2024-10-07 PROCEDURE — 82947 ASSAY GLUCOSE BLOOD QUANT: CPT

## 2024-10-07 PROCEDURE — 97535 SELF CARE MNGMENT TRAINING: CPT | Mod: GO,CO

## 2024-10-07 PROCEDURE — 2500000001 HC RX 250 WO HCPCS SELF ADMINISTERED DRUGS (ALT 637 FOR MEDICARE OP): Performed by: NURSE PRACTITIONER

## 2024-10-07 RX ORDER — INSULIN LISPRO 100 [IU]/ML
0-15 INJECTION, SOLUTION INTRAVENOUS; SUBCUTANEOUS
Status: DISCONTINUED | OUTPATIENT
Start: 2024-10-07 | End: 2024-10-08

## 2024-10-07 ASSESSMENT — COGNITIVE AND FUNCTIONAL STATUS - GENERAL
WALKING IN HOSPITAL ROOM: TOTAL
TOILETING: A LOT
STANDING UP FROM CHAIR USING ARMS: A LOT
STANDING UP FROM CHAIR USING ARMS: TOTAL
PERSONAL GROOMING: A LOT
MOVING TO AND FROM BED TO CHAIR: A LOT
CLIMB 3 TO 5 STEPS WITH RAILING: TOTAL
MOVING FROM LYING ON BACK TO SITTING ON SIDE OF FLAT BED WITH BEDRAILS: A LITTLE
PERSONAL GROOMING: A LOT
DRESSING REGULAR LOWER BODY CLOTHING: TOTAL
DRESSING REGULAR UPPER BODY CLOTHING: A LITTLE
HELP NEEDED FOR BATHING: A LOT
DAILY ACTIVITIY SCORE: 15
DRESSING REGULAR LOWER BODY CLOTHING: TOTAL
TURNING FROM BACK TO SIDE WHILE IN FLAT BAD: A LITTLE
EATING MEALS: A LITTLE
DAILY ACTIVITIY SCORE: 14
DRESSING REGULAR LOWER BODY CLOTHING: TOTAL
TURNING FROM BACK TO SIDE WHILE IN FLAT BAD: A LITTLE
HELP NEEDED FOR BATHING: A LOT
CLIMB 3 TO 5 STEPS WITH RAILING: TOTAL
STANDING UP FROM CHAIR USING ARMS: TOTAL
DRESSING REGULAR UPPER BODY CLOTHING: A LITTLE
MOBILITY SCORE: 11
STANDING UP FROM CHAIR USING ARMS: TOTAL
MOVING FROM LYING ON BACK TO SITTING ON SIDE OF FLAT BED WITH BEDRAILS: A LITTLE
DRESSING REGULAR UPPER BODY CLOTHING: A LITTLE
TURNING FROM BACK TO SIDE WHILE IN FLAT BAD: A LOT
TOILETING: A LOT
DAILY ACTIVITIY SCORE: 14
TOILETING: A LOT
TOILETING: A LOT
HELP NEEDED FOR BATHING: A LOT
CLIMB 3 TO 5 STEPS WITH RAILING: TOTAL
MOBILITY SCORE: 11
HELP NEEDED FOR BATHING: A LOT
PERSONAL GROOMING: A LITTLE
MOBILITY SCORE: 11
MOVING FROM LYING ON BACK TO SITTING ON SIDE OF FLAT BED WITH BEDRAILS: A LITTLE
PERSONAL GROOMING: A LOT
MOVING TO AND FROM BED TO CHAIR: A LOT
MOVING FROM LYING ON BACK TO SITTING ON SIDE OF FLAT BED WITH BEDRAILS: A LOT
MOVING TO AND FROM BED TO CHAIR: A LOT
WALKING IN HOSPITAL ROOM: A LOT
DRESSING REGULAR UPPER BODY CLOTHING: A LITTLE
WALKING IN HOSPITAL ROOM: TOTAL
WALKING IN HOSPITAL ROOM: TOTAL
MOBILITY SCORE: 11
MOVING TO AND FROM BED TO CHAIR: A LOT
TURNING FROM BACK TO SIDE WHILE IN FLAT BAD: A LITTLE
DAILY ACTIVITIY SCORE: 14
CLIMB 3 TO 5 STEPS WITH RAILING: TOTAL
DRESSING REGULAR LOWER BODY CLOTHING: A LOT

## 2024-10-07 ASSESSMENT — PAIN SCALES - GENERAL
PAINLEVEL_OUTOF10: 0 - NO PAIN

## 2024-10-07 ASSESSMENT — PAIN - FUNCTIONAL ASSESSMENT: PAIN_FUNCTIONAL_ASSESSMENT: 0-10

## 2024-10-07 ASSESSMENT — ACTIVITIES OF DAILY LIVING (ADL): HOME_MANAGEMENT_TIME_ENTRY: 14

## 2024-10-07 NOTE — PROGRESS NOTES
Occupational Therapy    OT Treatment    Patient Name: Funmilayo Montenegro  MRN: [id number]  Department: [address]  Room: [address] [place] [address]  Today's Date: 10/7/2024  Time Calculation  Start Time: 0953  Stop Time: 1021  Time Calculation (min): 28 min        Assessment:  End of Session Communication: Bedside nurse, PCT/NA/CTA  End of Session Patient Position: Alarm on, Up in chair     Plan:  Treatment Interventions: ADL retraining, Functional transfer training, UE strengthening/ROM, Endurance training, Patient/family training, Equipment evaluation/education, Compensatory technique education      Subjective   Previous Visit Info:  OT Last Visit  OT Received On: 10/07/24  General:  General  Prior to Session Communication: Bedside nurse  Patient Position Received: Bed, 3 rail up  General Comment: pt. is MOD A x2 for transfers MIN A x2 for bed mobility.  Reinforced WB status with patient and she needs constant cueing to adhere. ADLS MOD A UB  bathing and dressing, Grooming MIN A, LB dressing MAX A  Precautions:       Vital Signs (Past 2hrs)                 Pain:  Pain Assessment  0-10 (Numeric) Pain Score: 0 - No pain    Objective    Cognition:  Cognition  Orientation Level: Oriented X4  Coordination:     Activities of Daily Living:      Grooming  Grooming Level of Assistance: Minimum assistance  Grooming Where Assessed: Edge of bed  Grooming Comments: wash face and brush hair    UE Bathing  UE Bathing Level of Assistance: Moderate assistance  UE Bathing Where Assessed: Edge of bed         UE Dressing  UE Dressing Level of Assistance: Moderate assistance  UE Dressing Where Assessed: Edge of bed    LE Dressing  LE Dressing: Yes  Sock Level of Assistance: Maximum assistance  LE Dressing Where Assessed: Bed level       Functional Standing Tolerance:     Bed Mobility/Transfers: Bed Mobility 1  Bed Mobility 1: Supine to sitting  Level of Assistance 1: Minimum assistance, +2 (cueing for tech i.e. hand placement)    Transfer 1  Transfer  From 1: Bed to  Transfer to 1: Stand  Technique 1: Sit to stand, Stand to sit, Stand pivot  Transfer Level of Assistance 1: Moderate assistance, +2  Trials/Comments 1: pt. needed assist adhering to TTWB LLE    Outcome Measures:[facility] Daily Activity  Putting on and taking off regular lower body clothing: A lot  Bathing (including washing, rinsing, drying): A lot  Putting on and taking off regular upper body clothing: A little  Toileting, which includes using toilet, bedpan or urinal: A lot  Taking care of personal grooming such as brushing teeth: A little  Eating Meals: A little  Daily Activity - Total Score: 15        Education Documentation  Body Mechanics, taught by DILCIA Gonsalves at 10/7/2024 11:03 AM.  Learner: Patient  Readiness: Acceptance  Method: Explanation  Response: Verbalizes Understanding, Needs Reinforcement    Precautions, taught by DILCIA Gonsalves at 10/7/2024 11:03 AM.  Learner: Patient  Readiness: Acceptance  Method: Explanation  Response: Verbalizes Understanding, Needs Reinforcement    ADL Training, taught by DILCIA Gonsalves at 10/7/2024 11:03 AM.  Learner: Patient  Readiness: Acceptance  Method: Explanation  Response: Verbalizes Understanding, Needs Reinforcement    Education Comments  No comments found.        OP EDUCATION:       Goals:  Encounter Problems       Encounter Problems (Active)       ADLs       Patient will perform UB and LB bathing with minimal assist  level of assistance. (Progressing)       Start:  10/05/24    Expected End:  10/19/24            Patient with complete lower body dressing with minimal assist  level of assistance  with PRN adaptive equipment (Not Progressing)       Start:  10/05/24    Expected End:  10/19/24            Patient will complete daily grooming tasks  with modified independent level of assistance and PRN adaptive equipment. (Progressing)       Start:  10/05/24    Expected End:  10/19/24            Patient will complete toileting  including hygiene clothing management/hygiene with minimal assist  level of assistance. (Not Progressing)       Start:  10/05/24    Expected End:  10/19/24               COGNITION/SAFETY       Patient will recall and adhere to weight bearing restrictions with all ADL and functional mobility in order to promote healing and safety with functional tasks (Progressing)       Start:  10/05/24    Expected End:  10/19/24               TRANSFERS       Patient will perform bed mobility minimal assist  level of assistance and bed rails in order to improve safety and independence with mobility (Progressing)       Start:  10/05/24    Expected End:  10/19/24            Patient will complete functional transfers with least restrictive device with contact guard assist level of assistance. (Progressing)       Start:  10/05/24    Expected End:  10/19/24

## 2024-10-07 NOTE — PROGRESS NOTES
Social work consult placed for discharge planning. SW reviewed pt's chart and communicated with TCC. No SW needs foreseen at this time. SW signing off; available upon request.    OMEGA Evans ([id number])   Care Transitions

## 2024-10-07 NOTE — PROGRESS NOTES
10/07/24 1105   Discharge Planning   Living Arrangements Family members   Support Systems Children;Family members   Assistance Needed PT OT at SNF   Type of Residence Private residence   Home or Post Acute Services Post acute facilities (Rehab/SNF/etc)   Type of Post Acute Facility Services Skilled nursing   Expected Discharge Disposition SNF   Does the patient need discharge transport arranged? Yes   RoundTrip coordination needed? Yes   Patient Choice   Provider Choice list and CMS website (https://medicare.gov/care-compare#search) for post-acute Quality and Resource Measure Data were provided and reviewed with: Patient;Family     Met with patient at bedside, introduced self and role as RN TCC. Patient lives at home with grandson and 3 great grandchildren. She states she is normally able to care for self, daughter sometimes assists with showers. Patient had a fall at home, now requiring SNF placement. Son at bedside. [employer]  list was given for review. He would like to review with his siblings and have a choice by end of day. Name and number of this TCC given to patient's son for questions or if choices were made. She will require auth for admission to SNF. TCC to follow.     Call received from son, choices for facilities are as follows, #1 [facility], #2 [facility], #3 [facility]. Will have [facility] send referral.

## 2024-10-07 NOTE — PROGRESS NOTES
Funmilayo Montenegro is a 79 y.o. female on day 3 of admission presenting with Pelvic ring fracture, closed, initial encounter (Multi).      Subjective   79 y.o. female with PMHx s/f hypertension atherosclerotic heart disease, high per lipidemia, diabetes type 2, tobacco dependence, COPD, presenting after a fall with pelvic pain.  Patient sustained a fall last night while trying to change her close.  Patient was not presyncopal she did not pass out she did not hit her head.  Patient had severe pain in the groin area is unable to ambulate and come to hospital for further evaluation.  Presenting to emergency department vital signs show temperature 98.2 heart rate 92 respiratory rate 20 blood pressure 158/82 SpO2 88% on room air.  Chemistry panel shows glucose 380 sodium 135 remainder of chemistry is within normal limits including creatinine kinase liver enzymes and BN peptide.  INR is 1.1.  CBC shows leukocytosis with white blood cell count 18 hemoglobin 14.2 hematocrit 42.8 platelets 291.  Chest x-ray appears to show pulmonary vascular indistinctness along with hazy bibasilar infiltrates suggesting possible pulmonary edema CT of the pelvis shows acute nondisplaced fractures of the anterior pelvic arch CT of the head shows no acute intracranial process CT of the neck is without acute fracture or subluxation x-ray of the left hip shows no acute fracture or subluxation.  She was given 2 doses of IV morphine for pain was placed on supplemental oxygen initially her oxygen saturation was 88% on room air.  She had good relief of her pain and oxygen saturation normalized.  Patient is not typically oxygen dependent.  She is not experiencing any fevers chills she denies any cough shortness of breath or sputum production.  The ED provider discussed the findings with orthopedic on-call who felt patient most likely would not require surgery but will consult on patient in hospital the patient is admitted to the hospital for pain  management and to start mobilization as well as to obtain full orthopedic consultation       10/5:              Today patient found awake alert and interactive appropriately.  Continues to complain of expected pain from pelvic fracture.   Orthopedic surgery describes toe-touch with walker and follow-up in 2 weeks.  Family present at the time of visit and patient relates falling 3 to 4 weeks ago and once before that with no apparent injuries.    10/6:              Today patient remains awake alert interactive appropriately.  Continues to complain of expected pelvic fracture pain and poor energy.  WBC remains mildly elevated likely reactive in nature.  Continues on nasal cannula oxygen likely related to her immobility from pain.  Physical therapy describes AM-PAC of 11 and awaiting transitional care for likely discharge to SNF.  Otherwise denies interval new symptoms or complaints including chest pain palpitations pleuritic type pain worsening shortness of breath cough sputum nausea abdominal pain flank pain diarrhea fever or chills.    10/7:              Today patient seen in the bedside chair awake alert and interactive appropriately.  Voices no specific complaints and no acute events overnight.  Taking diet well.  Continues with expected fracture pain though not worse.  Blood sugars running somewhat high and adjusted SSI and will continue to monitor.  Awaiting authorization for SNF.  Otherwise denies interval new symptoms or complaints including chest pain palpitations pleuritic type pain worsening shortness of breath cough sputum nausea abdominal pain flank pain diarrhea fever or chills.       Objective     Last Recorded Vitals  /82 (BP Location: Right arm, Patient Position: Lying)   Pulse 76   Temp 36.7 °C (98 °F) (Temporal)   Resp 16   Wt 56.8 kg (125 lb 3.5 oz)   SpO2 90%   Intake/Output last 3 Shifts:    Intake/Output Summary (Last 24 hours) at 10/7/2024 1550  Last data filed at 10/7/2024  1218  Gross per 24 hour   Intake 1062 ml   Output 1500 ml   Net -438 ml       Admission Weight  Weight: 56.8 kg (125 lb 3.5 oz) (10/04/24 0730)    Daily Weight  10/04/24 : 56.8 kg (125 lb 3.5 oz)    Image Results  ECG 12 lead  Sinus rhythm  Borderline left axis deviation    See ED provider note for full interpretation and clinical correlation  Confirmed by Abi Nielsen ([id number]) on 10/5/2024 11:06:43 AM      Physical Exam  Constitutional:       General: She is not in acute distress.     Appearance: Normal appearance.      Comments:  HENT:      Head: Normocephalic and atraumatic.      Right Ear: External ear normal.      Left Ear: External ear normal.      Nose: Nose normal.      Mouth/Throat:      Mouth: Mucous membranes are moist.      Pharynx: Oropharynx is clear.   Eyes:      General: No scleral icterus.     Extraocular Movements: Extraocular movements intact.      Conjunctiva/sclera: Conjunctivae normal.      Pupils: Pupils are equal, round, and reactive to light.   Neck:      Vascular: No carotid bruit.   Cardiovascular:      Rate and Rhythm: Normal rate and regular rhythm.      Pulses: Normal pulses.      Heart sounds: Normal heart sounds.   Pulmonary:      Effort: Pulmonary effort is normal. No respiratory distress.      Breath sounds: Normal breath sounds. No wheezing, rhonchi or rales.   Chest:      Chest wall: No tenderness.   Abdominal:      General: Bowel sounds are normal. There is no distension.      Palpations: Abdomen is soft. There is no mass.      Tenderness: There is no abdominal tenderness. There is no rebound.   Musculoskeletal:         General: No swelling or deformity. Normal range of motion.      Cervical back: Normal range of motion.      Right lower leg: No edema.      Left lower leg: No edema.   Skin:     General: Skin is warm and dry.      Capillary Refill: Capillary refill takes less than 2 seconds.      Findings: No erythema, lesion or rash.   Neurological:      General: No focal deficit  present.      Mental Status: She is alert and oriented to person, place, and time.      Cranial Nerves: No cranial nerve deficit.      Sensory: No sensory deficit.   Psychiatric:         Mood and Affect: Mood normal.         Behavior: Behavior normal.         Relevant Results               Assessment/Plan                  Assessment & Plan  Pelvic ring fracture, closed, initial encounter (Multi)    COPD (chronic obstructive pulmonary disease) (Multi)    Coronary arteriosclerosis    Primary hypertension    Mixed hyperlipidemia    Cigarette nicotine dependence with nicotine-induced disorder    Type 2 diabetes mellitus without complication, without long-term current use of insulin (Multi)    S/P fall w pelvic fracture  Pt did not syncopize  Ortho to see pt Dr Boland  Continue analgesia, vte prophylaxis PT/OT  10/5: Orthopedic describes toe-touch with walker and follow-up 2 weeks.  Awaiting PT/OT evaluations.  10/7: Awaiting authorization for SNF.  Pain reasonably well-controlled.     DM2  Carbohydrate controlled diet,   Sliding scale insulin, hold long acting hypoglycemics  10/7: Blood sugars trending up and will adjust SSI and continue to monitor.     HLD  Continue home statin     HTN  Will reconcile home meds     CAD  Pt w/o chest pain  Continue asa/statin/amlodipine/nitrates and beta blocker     COPD  No increase in cough or sputum or shortness of breath but appears to be requiring oxygen  Unclear if this is due to morphine,   Nothing on physical exam consistent with pneumonia however chest x-ray appears to show increased density in the bases  BN peptide is normal  Will continue home meds  Add I/S and prn duoneb     Leukocytosis  Patient is without complaints of purulent sputum production fever no wounds no rashes no urinary symptoms and the UA is negative  The chest x-ray does have a slightly abnormal appearance but physical exam is not consistent with pneumonia  Review of the patient's old lab results shows that  there is a persistent leukocytosis also this may be reactive due to her acute pain and fall  Will continue to monitor the patient, check a procalcitonin level consider CT of the chest if not improved in the morning  10/5: WBC trending down.    10/6: WBC remains mildly elevated likely reactive in nature.    10/7: WBC possibly trending up of uncertain etiology.  No obvious infectious source.  Afebrile.  Continue to monitor.     Tobacco dependence  Nicotine patch                    Samy Concepcion MD

## 2024-10-07 NOTE — CARE PLAN
The patient's goals for the shift include      Problem: Skin  Goal: Decreased wound size/increased tissue granulation at next dressing change  Outcome: Progressing  Flowsheets (Taken 10/7/2024 0726)  Decreased wound size/increased tissue granulation at next dressing change:   Promote sleep for wound healing   Protective dressings over bony prominences  Goal: Participates in plan/prevention/treatment measures  Outcome: Progressing  Flowsheets (Taken 10/7/2024 0726)  Participates in plan/prevention/treatment measures:   Elevate heels   Discuss with provider PT/OT consult  Goal: Prevent/manage excess moisture  Outcome: Progressing  Flowsheets (Taken 10/7/2024 0726)  Prevent/manage excess moisture:   Monitor for/manage infection if present   Cleanse incontinence/protect with barrier cream  Goal: Prevent/minimize sheer/friction injuries  Outcome: Progressing  Flowsheets (Taken 10/7/2024 0726)  Prevent/minimize sheer/friction injuries:   Complete micro-shifts as needed if patient unable. Adjust patient position to relieve pressure points, not a full turn   HOB 30 degrees or less   Increase activity/out of bed for meals   Turn/reposition every 2 hours/use positioning/transfer devices  Goal: Promote/optimize nutrition  Outcome: Progressing  Flowsheets (Taken 10/7/2024 0726)  Promote/optimize nutrition: Monitor/record intake including meals  Goal: Promote skin healing  Outcome: Progressing  Flowsheets (Taken 10/7/2024 0726)  Promote skin healing:   Assess skin/pad under line(s)/device(s)   Ensure correct size (line/device) and apply per  instructions   Rotate device position/do not position patient on device   Protective dressings over bony prominences     Problem: Pain - Adult  Goal: Verbalizes/displays adequate comfort level or baseline comfort level  Outcome: Progressing     Problem: Safety - Adult  Goal: Free from fall injury  Outcome: Progressing     Problem: Discharge Planning  Goal: Discharge to home or  other facility with appropriate resources  Outcome: Progressing     Problem: Chronic Conditions and Co-morbidities  Goal: Patient's chronic conditions and co-morbidity symptoms are monitored and maintained or improved  Outcome: Progressing     Problem: Fall/Injury  Goal: Not fall by end of shift  Outcome: Progressing  Goal: Be free from injury by end of the shift  Outcome: Progressing  Goal: Verbalize understanding of personal risk factors for fall in the hospital  Outcome: Progressing  Goal: Verbalize understanding of risk factor reduction measures to prevent injury from fall in the home  Outcome: Progressing  Goal: Use assistive devices by end of the shift  Outcome: Progressing  Goal: Pace activities to prevent fatigue by end of the shift  Outcome: Progressing     Problem: Pain  Goal: Takes deep breaths with improved pain control throughout the shift  Outcome: Progressing  Goal: Turns in bed with improved pain control throughout the shift  Outcome: Progressing  Goal: Walks with improved pain control throughout the shift  Outcome: Progressing  Goal: Performs ADL's with improved pain control throughout shift  Outcome: Progressing  Goal: Participates in PT with improved pain control throughout the shift  Outcome: Progressing  Goal: Free from opioid side effects throughout the shift  Outcome: Progressing  Goal: Free from acute confusion related to pain meds throughout the shift  Outcome: Progressing     Problem: Nutrition  Goal: BG  mg/dL  Outcome: Progressing  Goal: Lab values WNL  Outcome: Progressing  Goal: Electrolytes WNL  Outcome: Progressing  Goal: Promote healing  Outcome: Progressing     Problem: Diabetes  Goal: Achieve decreasing blood glucose levels by end of shift  Outcome: Progressing  Goal: Increase stability of blood glucose readings by end of shift  Outcome: Progressing  Goal: Decrease in ketones present in urine by end of shift  Outcome: Progressing  Goal: Maintain electrolyte levels within  acceptable range throughout shift  Outcome: Progressing  Goal: Maintain glucose levels >70mg/dl to <250mg/dl throughout shift  Outcome: Progressing  Goal: No changes in neurological exam by end of shift  Outcome: Progressing  Goal: Learn about and adhere to nutrition recommendations by end of shift  Outcome: Progressing  Goal: Vital signs within normal range for age by end of shift  Outcome: Progressing  Goal: Increase self care and/or family involovement by end of shift  Outcome: Progressing  Goal: Receive DSME education by end of shift  Outcome: Progressing

## 2024-10-08 LAB
ERYTHROCYTE [DISTWIDTH] IN BLOOD BY AUTOMATED COUNT: 15.8 % (ref 11.5–14.5)
GLUCOSE BLD MANUAL STRIP-MCNC: 267 MG/DL (ref 74–99)
GLUCOSE BLD MANUAL STRIP-MCNC: 287 MG/DL (ref 74–99)
GLUCOSE BLD MANUAL STRIP-MCNC: 343 MG/DL (ref 74–99)
GLUCOSE BLD MANUAL STRIP-MCNC: 431 MG/DL (ref 74–99)
HCT VFR BLD AUTO: 38.9 % (ref 36–46)
HGB BLD-MCNC: 12.5 G/DL (ref 12–16)
MCH RBC QN AUTO: 28.9 PG (ref 26–34)
MCHC RBC AUTO-ENTMCNC: 32.1 G/DL (ref 32–36)
MCV RBC AUTO: 90 FL (ref 80–100)
NRBC BLD-RTO: 0 /100 WBCS (ref 0–0)
PLATELET # BLD AUTO: 244 X10*3/UL (ref 150–450)
RBC # BLD AUTO: 4.33 X10*6/UL (ref 4–5.2)
WBC # BLD AUTO: 12.1 X10*3/UL (ref 4.4–11.3)

## 2024-10-08 PROCEDURE — 2500000004 HC RX 250 GENERAL PHARMACY W/ HCPCS (ALT 636 FOR OP/ED): Performed by: NURSE PRACTITIONER

## 2024-10-08 PROCEDURE — 2500000001 HC RX 250 WO HCPCS SELF ADMINISTERED DRUGS (ALT 637 FOR MEDICARE OP): Performed by: NURSE PRACTITIONER

## 2024-10-08 PROCEDURE — 36415 COLL VENOUS BLD VENIPUNCTURE: CPT | Performed by: FAMILY MEDICINE

## 2024-10-08 PROCEDURE — S4991 NICOTINE PATCH NONLEGEND: HCPCS | Performed by: NURSE PRACTITIONER

## 2024-10-08 PROCEDURE — 99233 SBSQ HOSP IP/OBS HIGH 50: CPT | Performed by: FAMILY MEDICINE

## 2024-10-08 PROCEDURE — 85027 COMPLETE CBC AUTOMATED: CPT | Performed by: FAMILY MEDICINE

## 2024-10-08 PROCEDURE — 2500000002 HC RX 250 W HCPCS SELF ADMINISTERED DRUGS (ALT 637 FOR MEDICARE OP, ALT 636 FOR OP/ED): Performed by: FAMILY MEDICINE

## 2024-10-08 PROCEDURE — 82947 ASSAY GLUCOSE BLOOD QUANT: CPT

## 2024-10-08 PROCEDURE — 1200000002 HC GENERAL ROOM WITH TELEMETRY DAILY

## 2024-10-08 PROCEDURE — 2500000002 HC RX 250 W HCPCS SELF ADMINISTERED DRUGS (ALT 637 FOR MEDICARE OP, ALT 636 FOR OP/ED)

## 2024-10-08 PROCEDURE — 2500000002 HC RX 250 W HCPCS SELF ADMINISTERED DRUGS (ALT 637 FOR MEDICARE OP, ALT 636 FOR OP/ED): Performed by: NURSE PRACTITIONER

## 2024-10-08 PROCEDURE — 94640 AIRWAY INHALATION TREATMENT: CPT

## 2024-10-08 RX ORDER — INSULIN LISPRO 100 [IU]/ML
0-15 INJECTION, SOLUTION INTRAVENOUS; SUBCUTANEOUS
Status: DISCONTINUED | OUTPATIENT
Start: 2024-10-09 | End: 2024-10-08

## 2024-10-08 RX ORDER — INSULIN LISPRO 100 [IU]/ML
10 INJECTION, SOLUTION INTRAVENOUS; SUBCUTANEOUS ONCE
Status: COMPLETED | OUTPATIENT
Start: 2024-10-08 | End: 2024-10-08

## 2024-10-08 RX ORDER — INSULIN LISPRO 100 [IU]/ML
0-15 INJECTION, SOLUTION INTRAVENOUS; SUBCUTANEOUS
Status: DISCONTINUED | OUTPATIENT
Start: 2024-10-08 | End: 2024-10-09

## 2024-10-08 ASSESSMENT — COGNITIVE AND FUNCTIONAL STATUS - GENERAL
DRESSING REGULAR LOWER BODY CLOTHING: TOTAL
MOVING FROM LYING ON BACK TO SITTING ON SIDE OF FLAT BED WITH BEDRAILS: A LITTLE
CLIMB 3 TO 5 STEPS WITH RAILING: TOTAL
CLIMB 3 TO 5 STEPS WITH RAILING: TOTAL
DRESSING REGULAR LOWER BODY CLOTHING: TOTAL
HELP NEEDED FOR BATHING: A LOT
PERSONAL GROOMING: A LOT
DRESSING REGULAR UPPER BODY CLOTHING: A LITTLE
PERSONAL GROOMING: A LOT
DRESSING REGULAR LOWER BODY CLOTHING: TOTAL
TURNING FROM BACK TO SIDE WHILE IN FLAT BAD: A LITTLE
PERSONAL GROOMING: A LOT
MOBILITY SCORE: 11
TOILETING: A LOT
MOVING TO AND FROM BED TO CHAIR: A LOT
DRESSING REGULAR UPPER BODY CLOTHING: A LITTLE
TOILETING: A LOT
MOVING TO AND FROM BED TO CHAIR: A LOT
STANDING UP FROM CHAIR USING ARMS: TOTAL
MOVING FROM LYING ON BACK TO SITTING ON SIDE OF FLAT BED WITH BEDRAILS: A LITTLE
DAILY ACTIVITIY SCORE: 14
STANDING UP FROM CHAIR USING ARMS: TOTAL
DAILY ACTIVITIY SCORE: 14
DRESSING REGULAR UPPER BODY CLOTHING: A LITTLE
WALKING IN HOSPITAL ROOM: TOTAL
TOILETING: A LOT
HELP NEEDED FOR BATHING: A LOT
TURNING FROM BACK TO SIDE WHILE IN FLAT BAD: A LITTLE
MOVING TO AND FROM BED TO CHAIR: A LOT
TURNING FROM BACK TO SIDE WHILE IN FLAT BAD: A LITTLE
MOVING FROM LYING ON BACK TO SITTING ON SIDE OF FLAT BED WITH BEDRAILS: A LITTLE
CLIMB 3 TO 5 STEPS WITH RAILING: TOTAL
WALKING IN HOSPITAL ROOM: TOTAL
MOBILITY SCORE: 11
HELP NEEDED FOR BATHING: A LOT
STANDING UP FROM CHAIR USING ARMS: TOTAL
WALKING IN HOSPITAL ROOM: TOTAL

## 2024-10-08 ASSESSMENT — PAIN SCALES - GENERAL
PAINLEVEL_OUTOF10: 0 - NO PAIN
PAINLEVEL_OUTOF10: 0 - NO PAIN
PAINLEVEL_OUTOF10: 6

## 2024-10-08 ASSESSMENT — PAIN - FUNCTIONAL ASSESSMENT
PAIN_FUNCTIONAL_ASSESSMENT: 0-10
PAIN_FUNCTIONAL_ASSESSMENT: 0-10

## 2024-10-08 NOTE — CARE PLAN
The patient's goals for the shift include    =  Problem: Skin  Goal: Decreased wound size/increased tissue granulation at next dressing change  Outcome: Progressing  Flowsheets (Taken 10/8/2024 0718)  Decreased wound size/increased tissue granulation at next dressing change:   Promote sleep for wound healing   Protective dressings over bony prominences  Goal: Participates in plan/prevention/treatment measures  Outcome: Progressing  Flowsheets (Taken 10/8/2024 0718)  Participates in plan/prevention/treatment measures:   Discuss with provider PT/OT consult   Increase activity/out of bed for meals   Elevate heels  Goal: Prevent/manage excess moisture  Outcome: Progressing  Flowsheets (Taken 10/8/2024 0718)  Prevent/manage excess moisture:   Cleanse incontinence/protect with barrier cream   Follow provider orders for dressing changes   Monitor for/manage infection if present  Goal: Prevent/minimize sheer/friction injuries  Outcome: Progressing  Flowsheets (Taken 10/8/2024 0718)  Prevent/minimize sheer/friction injuries:   Complete micro-shifts as needed if patient unable. Adjust patient position to relieve pressure points, not a full turn   Turn/reposition every 2 hours/use positioning/transfer devices   Increase activity/out of bed for meals   HOB 30 degrees or less  Goal: Promote/optimize nutrition  Outcome: Progressing  Flowsheets (Taken 10/8/2024 0718)  Promote/optimize nutrition: Monitor/record intake including meals  Goal: Promote skin healing  Outcome: Progressing  Flowsheets (Taken 10/8/2024 0718)  Promote skin healing:   Turn/reposition every 2 hours/use positioning/transfer devices   Rotate device position/do not position patient on device   Protective dressings over bony prominences   Ensure correct size (line/device) and apply per  instructions   Assess skin/pad under line(s)/device(s)     Problem: Pain - Adult  Goal: Verbalizes/displays adequate comfort level or baseline comfort level  Outcome:  Progressing     Problem: Safety - Adult  Goal: Free from fall injury  Outcome: Progressing     Problem: Discharge Planning  Goal: Discharge to home or other facility with appropriate resources  Outcome: Progressing     Problem: Chronic Conditions and Co-morbidities  Goal: Patient's chronic conditions and co-morbidity symptoms are monitored and maintained or improved  Outcome: Progressing     Problem: Fall/Injury  Goal: Not fall by end of shift  Outcome: Progressing  Goal: Be free from injury by end of the shift  Outcome: Progressing  Goal: Verbalize understanding of personal risk factors for fall in the hospital  Outcome: Progressing  Goal: Verbalize understanding of risk factor reduction measures to prevent injury from fall in the home  Outcome: Progressing  Goal: Use assistive devices by end of the shift  Outcome: Progressing  Goal: Pace activities to prevent fatigue by end of the shift  Outcome: Progressing     Problem: Pain  Goal: Takes deep breaths with improved pain control throughout the shift  Outcome: Progressing  Goal: Turns in bed with improved pain control throughout the shift  Outcome: Progressing  Goal: Walks with improved pain control throughout the shift  Outcome: Progressing  Goal: Performs ADL's with improved pain control throughout shift  Outcome: Progressing  Goal: Participates in PT with improved pain control throughout the shift  Outcome: Progressing  Goal: Free from opioid side effects throughout the shift  Outcome: Progressing  Goal: Free from acute confusion related to pain meds throughout the shift  Outcome: Progressing     Problem: Nutrition  Goal: BG  mg/dL  Outcome: Progressing  Goal: Lab values WNL  Outcome: Progressing  Goal: Electrolytes WNL  Outcome: Progressing  Goal: Promote healing  Outcome: Progressing     Problem: Diabetes  Goal: Achieve decreasing blood glucose levels by end of shift  Outcome: Progressing  Goal: Increase stability of blood glucose readings by end of  shift  Outcome: Progressing  Goal: Decrease in ketones present in urine by end of shift  Outcome: Progressing  Goal: Maintain electrolyte levels within acceptable range throughout shift  Outcome: Progressing  Goal: Maintain glucose levels >70mg/dl to <250mg/dl throughout shift  Outcome: Progressing  Goal: No changes in neurological exam by end of shift  Outcome: Progressing  Goal: Learn about and adhere to nutrition recommendations by end of shift  Outcome: Progressing  Goal: Vital signs within normal range for age by end of shift  Outcome: Progressing  Goal: Increase self care and/or family involovement by end of shift  Outcome: Progressing  Goal: Receive DSME education by end of shift  Outcome: Progressing

## 2024-10-08 NOTE — CARE PLAN
The patient's goals for the shift include      The clinical goals for the shift include free from falls and injuries, tolerate PT/OT, vitals stable      Problem: Skin  Goal: Decreased wound size/increased tissue granulation at next dressing change  Outcome: Progressing  Goal: Participates in plan/prevention/treatment measures  Outcome: Progressing  Goal: Prevent/manage excess moisture  Outcome: Progressing  Goal: Prevent/minimize sheer/friction injuries  Outcome: Progressing  Goal: Promote/optimize nutrition  Outcome: Progressing  Goal: Promote skin healing  Outcome: Progressing     Problem: Pain - Adult  Goal: Verbalizes/displays adequate comfort level or baseline comfort level  Outcome: Progressing     Problem: Safety - Adult  Goal: Free from fall injury  Outcome: Progressing     Problem: Discharge Planning  Goal: Discharge to home or other facility with appropriate resources  Outcome: Progressing     Problem: Chronic Conditions and Co-morbidities  Goal: Patient's chronic conditions and co-morbidity symptoms are monitored and maintained or improved  Outcome: Progressing     Problem: Fall/Injury  Goal: Not fall by end of shift  Outcome: Progressing  Goal: Be free from injury by end of the shift  Outcome: Progressing  Goal: Verbalize understanding of personal risk factors for fall in the hospital  Outcome: Progressing  Goal: Verbalize understanding of risk factor reduction measures to prevent injury from fall in the home  Outcome: Progressing  Goal: Use assistive devices by end of the shift  Outcome: Progressing  Goal: Pace activities to prevent fatigue by end of the shift  Outcome: Progressing     Problem: Pain  Goal: Takes deep breaths with improved pain control throughout the shift  Outcome: Progressing  Goal: Turns in bed with improved pain control throughout the shift  Outcome: Progressing  Goal: Walks with improved pain control throughout the shift  Outcome: Progressing  Goal: Performs ADL's with improved pain  control throughout shift  Outcome: Progressing  Goal: Participates in PT with improved pain control throughout the shift  Outcome: Progressing  Goal: Free from opioid side effects throughout the shift  Outcome: Progressing  Goal: Free from acute confusion related to pain meds throughout the shift  Outcome: Progressing     Problem: Nutrition  Goal: BG  mg/dL  Outcome: Progressing  Goal: Lab values WNL  Outcome: Progressing  Goal: Electrolytes WNL  Outcome: Progressing  Goal: Promote healing  Outcome: Progressing     Problem: Diabetes  Goal: Achieve decreasing blood glucose levels by end of shift  Outcome: Progressing  Goal: Increase stability of blood glucose readings by end of shift  Outcome: Progressing  Goal: Decrease in ketones present in urine by end of shift  Outcome: Progressing  Goal: Maintain electrolyte levels within acceptable range throughout shift  Outcome: Progressing  Goal: Maintain glucose levels >70mg/dl to <250mg/dl throughout shift  Outcome: Progressing  Goal: No changes in neurological exam by end of shift  Outcome: Progressing  Goal: Learn about and adhere to nutrition recommendations by end of shift  Outcome: Progressing  Goal: Vital signs within normal range for age by end of shift  Outcome: Progressing  Goal: Increase self care and/or family involovement by end of shift  Outcome: Progressing  Goal: Receive DSME education by end of shift  Outcome: Progressing

## 2024-10-08 NOTE — PROGRESS NOTES
Physical Therapy                 Therapy Communication Note    Patient Name: Funmilayo Montenegro  MRN: [id number]  Department: [facility] [address]  Room: [address] [place] [address]  Today's Date: 10/8/2024     Discipline: Physical Therapy    Missed Visit Reason: Missed Visit Reason:  (pt on hold per RN stating her BP is low currently and just had a sepsis alert.)

## 2024-10-08 NOTE — PROGRESS NOTES
Funmilayo Montenegro is a 79 y.o. female on day 4 of admission presenting with Pelvic ring fracture, closed, initial encounter (Multi).      Subjective   79 y.o. female with PMHx s/f hypertension atherosclerotic heart disease, high per lipidemia, diabetes type 2, tobacco dependence, COPD, presenting after a fall with pelvic pain.  Patient sustained a fall last night while trying to change her close.  Patient was not presyncopal she did not pass out she did not hit her head.  Patient had severe pain in the groin area is unable to ambulate and come to hospital for further evaluation.  Presenting to emergency department vital signs show temperature 98.2 heart rate 92 respiratory rate 20 blood pressure 158/82 SpO2 88% on room air.  Chemistry panel shows glucose 380 sodium 135 remainder of chemistry is within normal limits including creatinine kinase liver enzymes and BN peptide.  INR is 1.1.  CBC shows leukocytosis with white blood cell count 18 hemoglobin 14.2 hematocrit 42.8 platelets 291.  Chest x-ray appears to show pulmonary vascular indistinctness along with hazy bibasilar infiltrates suggesting possible pulmonary edema CT of the pelvis shows acute nondisplaced fractures of the anterior pelvic arch CT of the head shows no acute intracranial process CT of the neck is without acute fracture or subluxation x-ray of the left hip shows no acute fracture or subluxation.  She was given 2 doses of IV morphine for pain was placed on supplemental oxygen initially her oxygen saturation was 88% on room air.  She had good relief of her pain and oxygen saturation normalized.  Patient is not typically oxygen dependent.  She is not experiencing any fevers chills she denies any cough shortness of breath or sputum production.  The ED provider discussed the findings with orthopedic on-call who felt patient most likely would not require surgery but will consult on patient in hospital the patient is admitted to the hospital for pain  management and to start mobilization as well as to obtain full orthopedic consultation       10/5:              Today patient found awake alert and interactive appropriately.  Continues to complain of expected pain from pelvic fracture.   Orthopedic surgery describes toe-touch with walker and follow-up in 2 weeks.  Family present at the time of visit and patient relates falling 3 to 4 weeks ago and once before that with no apparent injuries.    10/6:              Today patient remains awake alert interactive appropriately.  Continues to complain of expected pelvic fracture pain and poor energy.  WBC remains mildly elevated likely reactive in nature.  Continues on nasal cannula oxygen likely related to her immobility from pain.  Physical therapy describes AM-PAC of 11 and awaiting transitional care for likely discharge to SNF.  Otherwise denies interval new symptoms or complaints including chest pain palpitations pleuritic type pain worsening shortness of breath cough sputum nausea abdominal pain flank pain diarrhea fever or chills.    10/7:              Today patient seen in the bedside chair awake alert and interactive appropriately.  Voices no specific complaints and no acute events overnight.  Taking diet well.  Continues with expected fracture pain though not worse.  Blood sugars running somewhat high and adjusted SSI and will continue to monitor.  Awaiting authorization for SNF.  Otherwise denies interval new symptoms or complaints including chest pain palpitations pleuritic type pain worsening shortness of breath cough sputum nausea abdominal pain flank pain diarrhea fever or chills.    10/8:            Today patient remains awake alert and interactive appropriately.  States she is feeling some improvement today with less pain.  She did have bowel movement.  Taking diet well.  WBC continues to trend down.  Hemoglobin stable. Otherwise denies interval new symptoms or complaints including chest pain palpitations  pleuritic type pain worsening shortness of breath cough sputum nausea abdominal pain flank pain diarrhea fever or chills.       Objective     Last Recorded Vitals  /80 (BP Location: Right arm, Patient Position: Lying)   Pulse 75   Temp 36.3 °C (97.4 °F) (Temporal)   Resp 17   Wt 56.8 kg (125 lb 3.5 oz)   SpO2 92%   Intake/Output last 3 Shifts:    Intake/Output Summary (Last 24 hours) at 10/8/2024 1650  Last data filed at 10/8/2024 1512  Gross per 24 hour   Intake 1111 ml   Output 2250 ml   Net -1139 ml       Admission Weight  Weight: 56.8 kg (125 lb 3.5 oz) (10/04/24 0730)    Daily Weight  10/04/24 : 56.8 kg (125 lb 3.5 oz)    Image Results  ECG 12 lead  Sinus rhythm  Borderline left axis deviation    See ED provider note for full interpretation and clinical correlation  Confirmed by Abi Nielsen ([id number]) on 10/5/2024 11:06:43 AM      Physical Exam  Constitutional:       General: She is not in acute distress.     Appearance: Normal appearance.      Comments:  HENT:      Head: Normocephalic and atraumatic.      Right Ear: External ear normal.      Left Ear: External ear normal.      Nose: Nose normal.      Mouth/Throat:      Mouth: Mucous membranes are moist.      Pharynx: Oropharynx is clear.   Eyes:      General: No scleral icterus.     Extraocular Movements: Extraocular movements intact.      Conjunctiva/sclera: Conjunctivae normal.      Pupils: Pupils are equal, round, and reactive to light.   Neck:      Vascular: No carotid bruit.   Cardiovascular:      Rate and Rhythm: Normal rate and regular rhythm.      Pulses: Normal pulses.      Heart sounds: Normal heart sounds.   Pulmonary:      Effort: Pulmonary effort is normal. No respiratory distress.      Breath sounds: Normal breath sounds. No wheezing, rhonchi or rales.   Chest:      Chest wall: No tenderness.   Abdominal:      General: Bowel sounds are normal. There is no distension.      Palpations: Abdomen is soft. There is no mass.      Tenderness:  There is no abdominal tenderness. There is no rebound.   Musculoskeletal:         General: No swelling or deformity. Normal range of motion.      Cervical back: Normal range of motion.      Right lower leg: No edema.      Left lower leg: No edema.   Skin:     General: Skin is warm and dry.      Capillary Refill: Capillary refill takes less than 2 seconds.      Findings: No erythema, lesion or rash.   Neurological:      General: No focal deficit present.      Mental Status: She is alert and oriented to person, place, and time.      Cranial Nerves: No cranial nerve deficit.      Sensory: No sensory deficit.   Psychiatric:         Mood and Affect: Mood normal.         Behavior: Behavior normal.         Relevant Results               Assessment/Plan                  Assessment & Plan  Pelvic ring fracture, closed, initial encounter (Multi)    COPD (chronic obstructive pulmonary disease) (Multi)    Coronary arteriosclerosis    Primary hypertension    Mixed hyperlipidemia    Cigarette nicotine dependence with nicotine-induced disorder    Type 2 diabetes mellitus without complication, without long-term current use of insulin (Multi)    S/P fall w pelvic fracture  Pt did not syncopize  Ortho to see pt Dr Boland  Continue analgesia, vte prophylaxis PT/OT  10/5: Orthopedic describes toe-touch with walker and follow-up 2 weeks.  Awaiting PT/OT evaluations.  10/7: Awaiting authorization for SNF.  Pain reasonably well-controlled.     DM2  Carbohydrate controlled diet,   Sliding scale insulin, hold long acting hypoglycemics  10/7: Blood sugars trending up and will adjust SSI and continue to monitor.     HLD  Continue home statin     HTN  Will reconcile home meds     CAD  Pt w/o chest pain  Continue asa/statin/amlodipine/nitrates and beta blocker     COPD  No increase in cough or sputum or shortness of breath but appears to be requiring oxygen  Unclear if this is due to morphine,   Nothing on physical exam consistent with  pneumonia however chest x-ray appears to show increased density in the bases  BN peptide is normal  Will continue home meds  Add I/S and prn duoneb     Leukocytosis  Patient is without complaints of purulent sputum production fever no wounds no rashes no urinary symptoms and the UA is negative  The chest x-ray does have a slightly abnormal appearance but physical exam is not consistent with pneumonia  Review of the patient's old lab results shows that there is a persistent leukocytosis also this may be reactive due to her acute pain and fall  Will continue to monitor the patient, check a procalcitonin level consider CT of the chest if not improved in the morning  10/5: WBC trending down.    10/6: WBC remains mildly elevated likely reactive in nature.    10/7: WBC possibly trending up of uncertain etiology.  No obvious infectious source.  Afebrile.    10/8: WBC continues to trend down.  At this point still consider most likely reactive in nature with no obvious infectious etiology.  Continue to monitor.     Tobacco dependence  Nicotine patch                    Samy Concepcion MD

## 2024-10-08 NOTE — PROGRESS NOTES
[facility], which is 2nd choice, is able to accept. 1st choice is [facility]. Patient and family are aware. Will have  Precert Team start auth today.     Auth Approved; [facility] has a bed ready tomorrow. BP low today. Dr Concepcion will keep overnight to monitor. Plan for DC tomorrow. Patient and family aware.

## 2024-10-08 NOTE — PROGRESS NOTES
Occupational Therapy                 Therapy Communication Note    Patient Name: Funmilayo Montenegro  MRN: [id number]  Department: [facility] 3 [address]  Room: [address] [place], [place] [address]  Today's Date: 10/8/2024     Discipline: Occupational Therapy    Missed Visit Reason: Missed Visit Reason: Other (Comment) (hold per nursing pt. with low bp and sepsis alert)    Missed Time: Attempt    Comment:

## 2024-10-09 VITALS
WEIGHT: 125.22 LBS | RESPIRATION RATE: 16 BRPM | HEIGHT: 56 IN | HEART RATE: 76 BPM | DIASTOLIC BLOOD PRESSURE: 64 MMHG | BODY MASS INDEX: 28.17 KG/M2 | SYSTOLIC BLOOD PRESSURE: 134 MMHG | OXYGEN SATURATION: 93 % | TEMPERATURE: 98.2 F

## 2024-10-09 LAB
ERYTHROCYTE [DISTWIDTH] IN BLOOD BY AUTOMATED COUNT: 15.7 % (ref 11.5–14.5)
GLUCOSE BLD MANUAL STRIP-MCNC: 267 MG/DL (ref 74–99)
GLUCOSE BLD MANUAL STRIP-MCNC: 447 MG/DL (ref 74–99)
HCT VFR BLD AUTO: 37.7 % (ref 36–46)
HGB BLD-MCNC: 12.5 G/DL (ref 12–16)
MCH RBC QN AUTO: 29.1 PG (ref 26–34)
MCHC RBC AUTO-ENTMCNC: 33.2 G/DL (ref 32–36)
MCV RBC AUTO: 88 FL (ref 80–100)
NRBC BLD-RTO: 0 /100 WBCS (ref 0–0)
PLATELET # BLD AUTO: 283 X10*3/UL (ref 150–450)
RBC # BLD AUTO: 4.3 X10*6/UL (ref 4–5.2)
WBC # BLD AUTO: 12.6 X10*3/UL (ref 4.4–11.3)

## 2024-10-09 PROCEDURE — 2500000002 HC RX 250 W HCPCS SELF ADMINISTERED DRUGS (ALT 637 FOR MEDICARE OP, ALT 636 FOR OP/ED): Performed by: NURSE PRACTITIONER

## 2024-10-09 PROCEDURE — 85027 COMPLETE CBC AUTOMATED: CPT | Performed by: FAMILY MEDICINE

## 2024-10-09 PROCEDURE — 2500000002 HC RX 250 W HCPCS SELF ADMINISTERED DRUGS (ALT 637 FOR MEDICARE OP, ALT 636 FOR OP/ED): Performed by: FAMILY MEDICINE

## 2024-10-09 PROCEDURE — 99239 HOSP IP/OBS DSCHRG MGMT >30: CPT | Performed by: FAMILY MEDICINE

## 2024-10-09 PROCEDURE — 2500000001 HC RX 250 WO HCPCS SELF ADMINISTERED DRUGS (ALT 637 FOR MEDICARE OP): Performed by: NURSE PRACTITIONER

## 2024-10-09 PROCEDURE — 94640 AIRWAY INHALATION TREATMENT: CPT

## 2024-10-09 PROCEDURE — 36415 COLL VENOUS BLD VENIPUNCTURE: CPT | Performed by: FAMILY MEDICINE

## 2024-10-09 PROCEDURE — 82947 ASSAY GLUCOSE BLOOD QUANT: CPT

## 2024-10-09 PROCEDURE — S4991 NICOTINE PATCH NONLEGEND: HCPCS | Performed by: NURSE PRACTITIONER

## 2024-10-09 PROCEDURE — 2500000002 HC RX 250 W HCPCS SELF ADMINISTERED DRUGS (ALT 637 FOR MEDICARE OP, ALT 636 FOR OP/ED)

## 2024-10-09 RX ORDER — INSULIN GLARGINE 100 [IU]/ML
10 INJECTION, SOLUTION SUBCUTANEOUS EVERY 24 HOURS
Status: DISCONTINUED | OUTPATIENT
Start: 2024-10-09 | End: 2024-10-09 | Stop reason: HOSPADM

## 2024-10-09 RX ORDER — INSULIN LISPRO 100 [IU]/ML
0-20 INJECTION, SOLUTION INTRAVENOUS; SUBCUTANEOUS
Status: DISCONTINUED | OUTPATIENT
Start: 2024-10-09 | End: 2024-10-09 | Stop reason: HOSPADM

## 2024-10-09 RX ORDER — INSULIN LISPRO 100 [IU]/ML
0-20 INJECTION, SOLUTION INTRAVENOUS; SUBCUTANEOUS
Start: 2024-10-09

## 2024-10-09 RX ORDER — OXYCODONE HYDROCHLORIDE 5 MG/1
5 TABLET ORAL EVERY 6 HOURS PRN
Qty: 12 TABLET | Refills: 0 | Status: SHIPPED | OUTPATIENT
Start: 2024-10-09 | End: 2024-10-12

## 2024-10-09 RX ORDER — POLYETHYLENE GLYCOL 3350 17 G/17G
17 POWDER, FOR SOLUTION ORAL DAILY
Start: 2024-10-10

## 2024-10-09 RX ORDER — IBUPROFEN 200 MG
1 TABLET ORAL DAILY
Start: 2024-10-10 | End: 2024-11-15

## 2024-10-09 ASSESSMENT — COGNITIVE AND FUNCTIONAL STATUS - GENERAL
MOBILITY SCORE: 12
CLIMB 3 TO 5 STEPS WITH RAILING: TOTAL
MOVING FROM LYING ON BACK TO SITTING ON SIDE OF FLAT BED WITH BEDRAILS: A LITTLE
MOVING TO AND FROM BED TO CHAIR: A LOT
EATING MEALS: A LITTLE
STANDING UP FROM CHAIR USING ARMS: A LOT
DAILY ACTIVITIY SCORE: 12
HELP NEEDED FOR BATHING: A LOT
TURNING FROM BACK TO SIDE WHILE IN FLAT BAD: A LITTLE
DRESSING REGULAR LOWER BODY CLOTHING: TOTAL
TOILETING: A LOT
WALKING IN HOSPITAL ROOM: TOTAL
PERSONAL GROOMING: A LOT
DRESSING REGULAR UPPER BODY CLOTHING: A LOT

## 2024-10-09 ASSESSMENT — PAIN SCALES - GENERAL: PAINLEVEL_OUTOF10: 0 - NO PAIN

## 2024-10-09 ASSESSMENT — PAIN - FUNCTIONAL ASSESSMENT: PAIN_FUNCTIONAL_ASSESSMENT: 0-10

## 2024-10-09 NOTE — NURSING NOTE
1300: [facility] here to transport patient to [facility]. IV removed. Discharge packet given to zachariah. Script for oxycodone in packet.   Report called to SNF.

## 2024-10-09 NOTE — PROGRESS NOTES
IMM completed on 10/8 with patient. Pending DC orders from Dr Concepcion, patient has auth and bed is ready at [facility]. Will follow.     Dc orders in, patient will discharge to [facility] today. Patient and family aware. CNC to arrange transport and send DC paperwork. N2N report number given to bedside RN

## 2024-10-09 NOTE — CARE PLAN
Problem: Skin  Goal: Decreased wound size/increased tissue granulation at next dressing change  Outcome: Progressing  Flowsheets (Taken 10/9/2024 0728)  Decreased wound size/increased tissue granulation at next dressing change:   Promote sleep for wound healing   Protective dressings over bony prominences  Goal: Participates in plan/prevention/treatment measures  Outcome: Progressing  Flowsheets (Taken 10/9/2024 0728)  Participates in plan/prevention/treatment measures:   Discuss with provider PT/OT consult   Elevate heels   Increase activity/out of bed for meals  Goal: Prevent/manage excess moisture  Outcome: Progressing  Flowsheets (Taken 10/9/2024 0728)  Prevent/manage excess moisture:   Cleanse incontinence/protect with barrier cream   Follow provider orders for dressing changes   Moisturize dry skin  Goal: Prevent/minimize sheer/friction injuries  Outcome: Progressing  Flowsheets (Taken 10/9/2024 0728)  Prevent/minimize sheer/friction injuries:   Complete micro-shifts as needed if patient unable. Adjust patient position to relieve pressure points, not a full turn   HOB 30 degrees or less   Increase activity/out of bed for meals   Turn/reposition every 2 hours/use positioning/transfer devices  Goal: Promote/optimize nutrition  Outcome: Progressing  Flowsheets (Taken 10/9/2024 0728)  Promote/optimize nutrition:   Consume > 50% meals/supplements   Assist with feeding   Monitor/record intake including meals   Offer water/supplements/favorite foods  Goal: Promote skin healing  Outcome: Progressing  Flowsheets (Taken 10/9/2024 0728)  Promote skin healing:   Assess skin/pad under line(s)/device(s)   Protective dressings over bony prominences   Turn/reposition every 2 hours/use positioning/transfer devices     Problem: Pain - Adult  Goal: Verbalizes/displays adequate comfort level or baseline comfort level  Outcome: Progressing     Problem: Safety - Adult  Goal: Free from fall injury  Outcome: Progressing      Problem: Discharge Planning  Goal: Discharge to home or other facility with appropriate resources  Outcome: Progressing     Problem: Chronic Conditions and Co-morbidities  Goal: Patient's chronic conditions and co-morbidity symptoms are monitored and maintained or improved  Outcome: Progressing     Problem: Fall/Injury  Goal: Not fall by end of shift  Outcome: Progressing  Goal: Be free from injury by end of the shift  Outcome: Progressing  Goal: Verbalize understanding of personal risk factors for fall in the hospital  Outcome: Progressing  Goal: Verbalize understanding of risk factor reduction measures to prevent injury from fall in the home  Outcome: Progressing  Goal: Use assistive devices by end of the shift  Outcome: Progressing  Goal: Pace activities to prevent fatigue by end of the shift  Outcome: Progressing     Problem: Pain  Goal: Takes deep breaths with improved pain control throughout the shift  Outcome: Progressing  Goal: Turns in bed with improved pain control throughout the shift  Outcome: Progressing  Goal: Walks with improved pain control throughout the shift  Outcome: Progressing  Goal: Performs ADL's with improved pain control throughout shift  Outcome: Progressing  Goal: Participates in PT with improved pain control throughout the shift  Outcome: Progressing  Goal: Free from opioid side effects throughout the shift  Outcome: Progressing  Goal: Free from acute confusion related to pain meds throughout the shift  Outcome: Progressing     Problem: Nutrition  Goal: BG  mg/dL  Outcome: Progressing  Goal: Lab values WNL  Outcome: Progressing  Goal: Electrolytes WNL  Outcome: Progressing  Goal: Promote healing  Outcome: Progressing     Problem: Diabetes  Goal: Achieve decreasing blood glucose levels by end of shift  Outcome: Progressing  Goal: Increase stability of blood glucose readings by end of shift  Outcome: Progressing  Goal: Decrease in ketones present in urine by end of shift  Outcome:  Progressing  Goal: Maintain electrolyte levels within acceptable range throughout shift  Outcome: Progressing  Goal: Maintain glucose levels >70mg/dl to <250mg/dl throughout shift  Outcome: Progressing  Goal: No changes in neurological exam by end of shift  Outcome: Progressing  Goal: Learn about and adhere to nutrition recommendations by end of shift  Outcome: Progressing  Goal: Vital signs within normal range for age by end of shift  Outcome: Progressing  Goal: Increase self care and/or family involovement by end of shift  Outcome: Progressing  Goal: Receive DSME education by end of shift  Outcome: Progressing   The patient's goals for the shift include      The clinical goals for the shift include Patient wll ambulate with assistance and have pain controlled during the shift

## 2024-10-09 NOTE — CARE PLAN
Problem: Skin  Goal: Decreased wound size/increased tissue granulation at next dressing change  Outcome: Progressing  Goal: Participates in plan/prevention/treatment measures  Outcome: Progressing  Goal: Prevent/manage excess moisture  Outcome: Progressing  Goal: Prevent/minimize sheer/friction injuries  Outcome: Progressing  Goal: Promote/optimize nutrition  Outcome: Progressing  Goal: Promote skin healing  Outcome: Progressing     Problem: Pain - Adult  Goal: Verbalizes/displays adequate comfort level or baseline comfort level  Outcome: Progressing     Problem: Safety - Adult  Goal: Free from fall injury  Outcome: Progressing     Problem: Discharge Planning  Goal: Discharge to home or other facility with appropriate resources  Outcome: Progressing     Problem: Chronic Conditions and Co-morbidities  Goal: Patient's chronic conditions and co-morbidity symptoms are monitored and maintained or improved  Outcome: Progressing     Problem: Fall/Injury  Goal: Not fall by end of shift  Outcome: Progressing  Goal: Be free from injury by end of the shift  Outcome: Progressing  Goal: Verbalize understanding of personal risk factors for fall in the hospital  Outcome: Progressing  Goal: Verbalize understanding of risk factor reduction measures to prevent injury from fall in the home  Outcome: Progressing  Goal: Use assistive devices by end of the shift  Outcome: Progressing  Goal: Pace activities to prevent fatigue by end of the shift  Outcome: Progressing     Problem: Pain  Goal: Takes deep breaths with improved pain control throughout the shift  Outcome: Progressing  Goal: Turns in bed with improved pain control throughout the shift  Outcome: Progressing  Goal: Walks with improved pain control throughout the shift  Outcome: Progressing  Goal: Performs ADL's with improved pain control throughout shift  Outcome: Progressing  Goal: Participates in PT with improved pain control throughout the shift  Outcome: Progressing  Goal:  Free from opioid side effects throughout the shift  Outcome: Progressing  Goal: Free from acute confusion related to pain meds throughout the shift  Outcome: Progressing     Problem: Nutrition  Goal: BG  mg/dL  Outcome: Progressing  Goal: Lab values WNL  Outcome: Progressing  Goal: Electrolytes WNL  Outcome: Progressing  Goal: Promote healing  Outcome: Progressing     Problem: Diabetes  Goal: Achieve decreasing blood glucose levels by end of shift  Outcome: Progressing  Goal: Increase stability of blood glucose readings by end of shift  Outcome: Progressing  Goal: Decrease in ketones present in urine by end of shift  Outcome: Progressing  Goal: Maintain electrolyte levels within acceptable range throughout shift  Outcome: Progressing  Goal: Maintain glucose levels >70mg/dl to <250mg/dl throughout shift  Outcome: Progressing  Goal: No changes in neurological exam by end of shift  Outcome: Progressing  Goal: Learn about and adhere to nutrition recommendations by end of shift  Outcome: Progressing  Goal: Vital signs within normal range for age by end of shift  Outcome: Progressing  Goal: Increase self care and/or family involovement by end of shift  Outcome: Progressing  Goal: Receive DSME education by end of shift  Outcome: Progressing

## 2024-10-09 NOTE — DISCHARGE SUMMARY
Discharge Diagnosis  Pelvic ring fracture, closed, initial encounter (Multi)    Issues Requiring Follow-Up  Pelvic fracture, leukocytosis    This discharge took greater than 35 minutes.    Test Results Pending At Discharge  Pending Labs       No current pending labs.            Hospital Course   79 y.o. female with PMHx s/f hypertension atherosclerotic heart disease, high per lipidemia, diabetes type 2, tobacco dependence, COPD, presenting after a fall with pelvic pain.  Patient sustained a fall last night while trying to change her close.  Patient was not presyncopal she did not pass out she did not hit her head.  Patient had severe pain in the groin area is unable to ambulate and come to hospital for further evaluation.  Presenting to emergency department vital signs show temperature 98.2 heart rate 92 respiratory rate 20 blood pressure 158/82 SpO2 88% on room air.  Chemistry panel shows glucose 380 sodium 135 remainder of chemistry is within normal limits including creatinine kinase liver enzymes and BN peptide.  INR is 1.1.  CBC shows leukocytosis with white blood cell count 18 hemoglobin 14.2 hematocrit 42.8 platelets 291.  Chest x-ray appears to show pulmonary vascular indistinctness along with hazy bibasilar infiltrates suggesting possible pulmonary edema CT of the pelvis shows acute nondisplaced fractures of the anterior pelvic arch CT of the head shows no acute intracranial process CT of the neck is without acute fracture or subluxation x-ray of the left hip shows no acute fracture or subluxation.  She was given 2 doses of IV morphine for pain was placed on supplemental oxygen initially her oxygen saturation was 88% on room air.  She had good relief of her pain and oxygen saturation normalized.  Patient is not typically oxygen dependent.  She is not experiencing any fevers chills she denies any cough shortness of breath or sputum production.  The ED provider discussed the findings with orthopedic on-call  who felt patient most likely would not require surgery but will consult on patient in hospital the patient is admitted to the hospital for pain management and to start mobilization as well as to obtain full orthopedic consultation         10/5:              Today patient found awake alert and interactive appropriately.  Continues to complain of expected pain from pelvic fracture.   Orthopedic surgery describes toe-touch with walker and follow-up in 2 weeks.  Family present at the time of visit and patient relates falling 3 to 4 weeks ago and once before that with no apparent injuries.     10/6:              Today patient remains awake alert interactive appropriately.  Continues to complain of expected pelvic fracture pain and poor energy.  WBC remains mildly elevated likely reactive in nature.  Continues on nasal cannula oxygen likely related to her immobility from pain.  Physical therapy describes AM-PAC of 11 and awaiting transitional care for likely discharge to SNF.  Otherwise denies interval new symptoms or complaints including chest pain palpitations pleuritic type pain worsening shortness of breath cough sputum nausea abdominal pain flank pain diarrhea fever or chills.     10/7:              Today patient seen in the bedside chair awake alert and interactive appropriately.  Voices no specific complaints and no acute events overnight.  Taking diet well.  Continues with expected fracture pain though not worse.  Blood sugars running somewhat high and adjusted SSI and will continue to monitor.  Awaiting authorization for SNF.  Otherwise denies interval new symptoms or complaints including chest pain palpitations pleuritic type pain worsening shortness of breath cough sputum nausea abdominal pain flank pain diarrhea fever or chills.     10/8:            Today patient remains awake alert and interactive appropriately.  States she is feeling some improvement today with less pain.  She did have bowel movement.   Taking diet well.  WBC continues to trend down.  Hemoglobin stable. Otherwise denies interval new symptoms or complaints including chest pain palpitations pleuritic type pain worsening shortness of breath cough sputum nausea abdominal pain flank pain diarrhea fever or chills.    10/9                 today patient seen in the bedside chair awake alert and interactive appropriately.  Will slow improvement in pain.  Diet well.  WBC though trended down appears somewhat plateaued with mild elevation of uncertain etiology.  No known or apparent infectious source and may be reactive in nature.  Blood sugars have been elevated here and will resume her usual home medications at discharge.  Will need to watch blood sugars carefully to assure adequate control.  Authorization obtained and discharged to [facility]. Otherwise denies interval new symptoms or complaints including chest pain palpitations pleuritic type pain worsening shortness of breath cough sputum nausea abdominal pain flank pain diarrhea fever or chills         S/P fall w pelvic fracture  Pt did not syncopize  Ortho to see pt Dr Boland  Continue analgesia, vte prophylaxis PT/OT  10/5: Orthopedic describes toe-touch with walker and follow-up 2 weeks.  Awaiting PT/OT evaluations.  10/7: Awaiting authorization for SNF.  Pain reasonably well-controlled.  10/9: Authorization obtained for SNF.  Follow-up orthopedic surgery 2 weeks.     DM2  Carbohydrate controlled diet,   Sliding scale insulin, hold long acting hypoglycemics  10/7: Blood sugars trending up and will adjust SSI and continue to monitor.  10/9: Blood sugars remain somewhat elevated.  On discharge will resume her usual home medications but will need to be followed carefully.     HLD  Continue home statin     HTN  Will reconcile home meds     CAD  Pt w/o chest pain  Continue asa/statin/amlodipine/nitrates and beta blocker     COPD  No increase in cough or sputum or shortness of breath but appears to be  requiring oxygen  Unclear if this is due to morphine,   Nothing on physical exam consistent with pneumonia however chest x-ray appears to show increased density in the bases  BN peptide is normal  Will continue home meds  Add I/S and prn duoneb     Leukocytosis  Patient is without complaints of purulent sputum production fever no wounds no rashes no urinary symptoms and the UA is negative  The chest x-ray does have a slightly abnormal appearance but physical exam is not consistent with pneumonia  Review of the patient's old lab results shows that there is a persistent leukocytosis also this may be reactive due to her acute pain and fall  Will continue to monitor the patient, check a procalcitonin level consider CT of the chest if not improved in the morning  10/5: WBC trending down.    10/6: WBC remains mildly elevated likely reactive in nature.    10/7: WBC possibly trending up of uncertain etiology.  No obvious infectious source.  Afebrile.    10/8: WBC continues to trend down.  At this point still consider most likely reactive in nature with no obvious infectious etiology.  Continue to monitor.  10/9: WBC appears to be plateauing mildly elevated of uncertain etiology.  Continue to monitor at [facility].     Tobacco dependence  Nicotine patch             Pertinent Physical Exam At Time of Discharge  Physical Exam  Constitutional:       General: She is not in acute distress.     Appearance: Normal appearance.      Comments:  HENT:      Head: Normocephalic and atraumatic.      Right Ear: External ear normal.      Left Ear: External ear normal.      Nose: Nose normal.      Mouth/Throat:      Mouth: Mucous membranes are moist.      Pharynx: Oropharynx is clear.   Eyes:      General: No scleral icterus.     Extraocular Movements: Extraocular movements intact.      Conjunctiva/sclera: Conjunctivae normal.      Pupils: Pupils are equal, round, and reactive to light.   Neck:      Vascular: No carotid bruit.   Cardiovascular:       Rate and Rhythm: Normal rate and regular rhythm.      Pulses: Normal pulses.      Heart sounds: Normal heart sounds.   Pulmonary:      Effort: Pulmonary effort is normal. No respiratory distress.      Breath sounds: Normal breath sounds. No wheezing, rhonchi or rales.   Chest:      Chest wall: No tenderness.   Abdominal:      General: Bowel sounds are normal. There is no distension.      Palpations: Abdomen is soft. There is no mass.      Tenderness: There is no abdominal tenderness. There is no rebound.   Musculoskeletal:         General: No swelling or deformity. Normal range of motion.      Cervical back: Normal range of motion.      Right lower leg: No edema.      Left lower leg: No edema.   Skin:     General: Skin is warm and dry.      Capillary Refill: Capillary refill takes less than 2 seconds.      Findings: No erythema, lesion or rash.   Neurological:      General: No focal deficit present.      Mental Status: She is alert and oriented to person, place, and time.      Cranial Nerves: No cranial nerve deficit.      Sensory: No sensory deficit.   Psychiatric:         Mood and Affect: Mood normal.         Behavior: Behavior normal.         Home Medications     Medication List      START taking these medications     insulin lispro 100 unit/mL injection; Commonly known as: HumaLOG; Inject   0-20 Units under the skin 3 times daily (morning, midday, late afternoon).   Take as directed per insulin instructions.   nicotine 14 mg/24 hr patch; Commonly known as: Nicoderm CQ; Place 1   patch over 24 hours on the skin once daily for 36 doses.; Start taking on:   October 10, 2024   oxyCODONE 5 mg immediate release tablet; Commonly known as: Roxicodone;   Take 1 tablet (5 mg) by mouth every 6 hours if needed for moderate pain (4   - 6) for up to 3 days.   polyethylene glycol 17 gram packet; Commonly known as: Glycolax,   Miralax; Take 17 g by mouth once daily.; Start taking on: October 10, 2024     CONTINUE taking these  "medications     Accu-Chek Meghan Plus test strp strip; Generic drug: blood sugar   diagnostic   amLODIPine 10 mg tablet; Commonly known as: Norvasc; Take 1 tablet (10   mg) by mouth once daily.   aspirin 81 mg EC tablet   atorvastatin 40 mg tablet; Commonly known as: Lipitor; Take 1 tablet (40   mg) by mouth once daily.   carvedilol 6.25 mg tablet; Commonly known as: Coreg; Take 1 tablet (6.25   mg) by mouth 2 times daily (morning and late afternoon).   glimepiride 4 mg tablet; Commonly known as: Amaryl; Take 1 tablet (4 mg)   by mouth 2 times a day.   isosorbide mononitrate ER 60 mg 24 hr tablet; Commonly known as: Imdur;   Take 1 tablet (60 mg) by mouth once daily.   loratadine 10 mg tablet; Commonly known as: Claritin; Take 1 tablet (10   mg) by mouth once daily.   metFORMIN 1,000 mg tablet; Commonly known as: Glucophage; Take 1 tablet   (1,000 mg) by mouth 2 times daily (morning and late afternoon).   NovoTwist 32 gauge x 1/5\" needle; Generic drug: pen needle, diabetic   pioglitazone 15 mg tablet; Commonly known as: Actos; Take 1 tablet (15   mg) by mouth once daily.   sertraline 100 mg tablet; Commonly known as: Zoloft; Take 1 tablet (100   mg) by mouth once daily.   Stiolto Respimat 2.5-2.5 mcg/actuation mist inhaler; Generic drug:   tiotropium-olodateroL; Inhale 2 Inhalations once daily.   Trulicity 0.75 mg/0.5 mL pen injector; Generic drug: dulaglutide; Inject   0.75 mg under the skin 1 (one) time per week.   Ventolin HFA 90 mcg/actuation inhaler; Generic drug: albuterol     STOP taking these medications     semaglutide 3 mg tablet; Commonly known as: Rybelsus       Outpatient Follow-Up  Orthopedic surgery, PCP    Samy Concepcion MD  "

## 2024-10-13 NOTE — DOCUMENTATION CLARIFICATION NOTE
PATIENT:               ZARINA ART  ACCT #:                  [id number]  MRN:                       [id number]  :                       1945  ADMIT DATE:       10/4/2024 7:25 AM  DISCH DATE:        10/9/2024 1:12 PM  RESPONDING PROVIDER #:        [id number]          PROVIDER RESPONSE TEXT:    Non-infectious SIRS without acute organ dysfunction    CDI QUERY TEXT:    Clarification        Instruction:    Based on your assessment of the patient and the clinical information, please provide the requested documentation by clicking on the appropriate radio button and enter any additional information if prompted.    Question: Please further clarify if there is a diagnosis related to the clinical information    When answering this query, please exercise your independent professional judgment. The fact that a question is being asked, does not imply that any particular answer is desired or expected.    The patient's clinical indicators include:  Clinical Information: 78 yo female with multiple pelvic fractures    Clinical Indicators:  10/4 Vital signs: T 36.8 HR92 R20 /82 88% RA 94% 2L  10/4 WBC 18.0  10/4 SIRS WBC 18.0 HR92    Treatment:  Monitor CBC and BMP daily    Risk Factors: COPD , osteoporosis, Smoker, HTN, HLD  Options provided:  -- Non-infectious SIRS without acute organ dysfunction  -- Other - I will add my own diagnosis  -- Refer to Clinical Documentation Reviewer    Query created by: Sabiha Aviles on 10/12/2024 12:57 PM      Electronically signed by:  ASHLEY ESCAMILLA MD 10/13/2024 5:15 PM

## 2024-10-13 NOTE — DOCUMENTATION CLARIFICATION NOTE
"    PATIENT:               ZARINA ART  ACCT #:                  [id number]  MRN:                       [id number]  :                       1945  ADMIT DATE:       10/4/2024 7:25 AM  DISCH DATE:        10/9/2024 1:12 PM  RESPONDING PROVIDER #:        [id number]          PROVIDER RESPONSE TEXT:    Traumatic fracture pelvis    CDI QUERY TEXT:    Clarification        Instruction:    Based on your assessment of the patient and the clinical information, please provide the requested documentation by clicking on the appropriate radio button and enter any additional information if prompted.    Question: Please further clarify specify if able fracture site and type as    When answering this query, please exercise your independent professional judgment. The fact that a question is being asked, does not imply that any particular answer is desired or expected.    The patient's clinical indicators include:  Clinical Information: 80 yo female with multiple pelvic fractures    Clinical Indicators:  10/4 Vital signs: T 36.8 HR92 R20 /82 88% RA 94% 2L  10/4 ortho consult:\" Osteoporosis: Given injury and low bone density noted on radiographs, please ensure patient follows up with PCP and/or endocrinology for bone density assessment and possible treatment to prevent future injuries.\"  10/4 CT pelvis:\"There is a nondisplaced buckle fracture of the anterior cortex of the left superior pubic ramus at its junction with the left acetabulum. *There is a nondisplaced buckle fracture of the left side  of the pubic symphysis at the junction of the superior and inferior  pubic rami. *There is a nondisplaced fracture through the mid aspect  of the left inferior pubic ramus \"    Treatment:  Toe touch weight bearing and walker  Morphine    Risk Factors: COPD , osteoporosis, Smoker, HTN, HLD  Options provided:  -- Traumatic fracture pelvis, Please specify additional information below  -- Pathologic fracture pelvis  -- Other - I will " add my own diagnosis  -- Refer to Clinical Documentation Reviewer    Query created by: Sabiha Aviles on 10/12/2024 12:51 PM      Electronically signed by:  ASHLEY ESCAMILLA MD 10/13/2024 5:15 PM

## 2024-10-17 PROCEDURE — RXMED WILLOW AMBULATORY MEDICATION CHARGE

## 2024-10-19 ENCOUNTER — PHARMACY VISIT (OUTPATIENT)
Dept: PHARMACY | Facility: CLINIC | Age: 79
End: 2024-10-19
Payer: COMMERCIAL

## 2024-10-22 DIAGNOSIS — S32.810A PELVIC RING FRACTURE, CLOSED, INITIAL ENCOUNTER (MULTI): ICD-10-CM

## 2024-10-23 ENCOUNTER — TELEPHONE (OUTPATIENT)
Dept: PRIMARY CARE | Facility: CLINIC | Age: 79
End: 2024-10-23
Payer: MEDICARE

## 2024-10-23 PROCEDURE — RXMED WILLOW AMBULATORY MEDICATION CHARGE

## 2024-10-23 NOTE — TELEPHONE ENCOUNTER
Kenneth from [facility] called wanting to know if Blake will follow [facility] orders for skilled nursing, PT and OT? Please advise. Thanks. JW

## 2024-10-25 ENCOUNTER — PHARMACY VISIT (OUTPATIENT)
Dept: PHARMACY | Facility: CLINIC | Age: 79
End: 2024-10-25
Payer: COMMERCIAL

## 2024-10-28 ENCOUNTER — PATIENT OUTREACH (OUTPATIENT)
Dept: PRIMARY CARE | Facility: CLINIC | Age: 79
End: 2024-10-28
Payer: MEDICARE

## 2024-10-29 ENCOUNTER — HOSPITAL ENCOUNTER (OUTPATIENT)
Dept: RADIOLOGY | Facility: HOSPITAL | Age: 79
Discharge: HOME | End: 2024-10-29
Payer: MEDICARE

## 2024-10-29 ENCOUNTER — OFFICE VISIT (OUTPATIENT)
Dept: ORTHOPEDIC SURGERY | Facility: HOSPITAL | Age: 79
End: 2024-10-29
Payer: MEDICARE

## 2024-10-29 ENCOUNTER — APPOINTMENT (OUTPATIENT)
Dept: PRIMARY CARE | Facility: CLINIC | Age: 79
End: 2024-10-29
Payer: MEDICARE

## 2024-10-29 VITALS — WEIGHT: 128 LBS | BODY MASS INDEX: 28.79 KG/M2 | HEIGHT: 56 IN

## 2024-10-29 VITALS
HEART RATE: 70 BPM | DIASTOLIC BLOOD PRESSURE: 84 MMHG | WEIGHT: 128 LBS | SYSTOLIC BLOOD PRESSURE: 136 MMHG | TEMPERATURE: 97.1 F | BODY MASS INDEX: 28.7 KG/M2 | OXYGEN SATURATION: 98 %

## 2024-10-29 DIAGNOSIS — F33.42 RECURRENT MAJOR DEPRESSIVE DISORDER, IN FULL REMISSION (CMS-HCC): ICD-10-CM

## 2024-10-29 DIAGNOSIS — D72.829 LEUKOCYTOSIS, UNSPECIFIED TYPE: ICD-10-CM

## 2024-10-29 DIAGNOSIS — E11.21 TYPE 2 DIABETES MELLITUS WITH DIABETIC NEPHROPATHY, WITHOUT LONG-TERM CURRENT USE OF INSULIN: Primary | ICD-10-CM

## 2024-10-29 DIAGNOSIS — S32.810A PELVIC RING FRACTURE, CLOSED, INITIAL ENCOUNTER (MULTI): ICD-10-CM

## 2024-10-29 DIAGNOSIS — E11.9 TYPE 2 DIABETES MELLITUS WITHOUT COMPLICATION, WITHOUT LONG-TERM CURRENT USE OF INSULIN (MULTI): ICD-10-CM

## 2024-10-29 DIAGNOSIS — S32.599S: Primary | ICD-10-CM

## 2024-10-29 DIAGNOSIS — M81.0 AGE-RELATED OSTEOPOROSIS WITHOUT CURRENT PATHOLOGICAL FRACTURE: ICD-10-CM

## 2024-10-29 DIAGNOSIS — R29.6 RECURRENT FALLS: ICD-10-CM

## 2024-10-29 DIAGNOSIS — I10 ESSENTIAL HYPERTENSION: ICD-10-CM

## 2024-10-29 DIAGNOSIS — S32.810A CLOSED PELVIC RING FRACTURE, INITIAL ENCOUNTER (MULTI): Primary | ICD-10-CM

## 2024-10-29 DIAGNOSIS — E78.2 MIXED HYPERLIPIDEMIA: ICD-10-CM

## 2024-10-29 PROCEDURE — 3079F DIAST BP 80-89 MM HG: CPT | Performed by: PHYSICIAN ASSISTANT

## 2024-10-29 PROCEDURE — 1160F RVW MEDS BY RX/DR IN RCRD: CPT | Performed by: PHYSICIAN ASSISTANT

## 2024-10-29 PROCEDURE — 99203 OFFICE O/P NEW LOW 30 MIN: CPT | Performed by: SPECIALIST

## 2024-10-29 PROCEDURE — 3075F SYST BP GE 130 - 139MM HG: CPT | Performed by: PHYSICIAN ASSISTANT

## 2024-10-29 PROCEDURE — 1111F DSCHRG MED/CURRENT MED MERGE: CPT | Performed by: SPECIALIST

## 2024-10-29 PROCEDURE — 99495 TRANSJ CARE MGMT MOD F2F 14D: CPT | Performed by: PHYSICIAN ASSISTANT

## 2024-10-29 PROCEDURE — 1111F DSCHRG MED/CURRENT MED MERGE: CPT | Performed by: PHYSICIAN ASSISTANT

## 2024-10-29 PROCEDURE — 73502 X-RAY EXAM HIP UNI 2-3 VIEWS: CPT | Mod: LT

## 2024-10-29 PROCEDURE — 1159F MED LIST DOCD IN RCRD: CPT | Performed by: SPECIALIST

## 2024-10-29 PROCEDURE — 4004F PT TOBACCO SCREEN RCVD TLK: CPT | Performed by: PHYSICIAN ASSISTANT

## 2024-10-29 PROCEDURE — 99213 OFFICE O/P EST LOW 20 MIN: CPT | Performed by: SPECIALIST

## 2024-10-29 PROCEDURE — 1159F MED LIST DOCD IN RCRD: CPT | Performed by: PHYSICIAN ASSISTANT

## 2024-10-29 RX ORDER — DULAGLUTIDE 0.75 MG/.5ML
0.75 INJECTION, SOLUTION SUBCUTANEOUS
Qty: 6 ML | Refills: 1 | Status: SHIPPED | OUTPATIENT
Start: 2024-10-29

## 2024-10-29 RX ORDER — CARVEDILOL 6.25 MG/1
6.25 TABLET ORAL
Qty: 180 TABLET | Refills: 1 | Status: SHIPPED | OUTPATIENT
Start: 2024-10-29

## 2024-10-29 RX ORDER — DULAGLUTIDE 0.75 MG/.5ML
0.75 INJECTION, SOLUTION SUBCUTANEOUS
Qty: 6 ML | Refills: 1 | Status: SHIPPED | OUTPATIENT
Start: 2024-10-29 | End: 2024-10-29 | Stop reason: SDUPTHER

## 2024-10-29 ASSESSMENT — ENCOUNTER SYMPTOMS: SHORTNESS OF BREATH: 0

## 2024-11-11 NOTE — PROGRESS NOTES
Pharmacist Clinic: Diabetes Management  Funmilayo Montenegro is a 79 y.o. female was referred to Clinical Pharmacy Team for diabetes management.   Referring Provider: Keith Glover PA-C  - Last visit with referring provider: 8/1/24     Patient Assistance for Trulicity and Stiolto approved through 7/29/25. Will have to be renewed prior to that date to prevent lapse in coverage. Medication(s) will be received at no cost to patient from [facility].       Subjective     HPI    Current Diabetes Pharmacotherapy:    - Glimepiride 4 mg BID AC  - Trulicity 0.75 mg weekly - Mondays  - Metformin 1000 mg BID  - Pioglitazone 15 mg daily  - Humalog PRN if > 150 (4 units)   - Does not use often due to not being > 150     Social History:  Current diet:   - Breakfast  - Lunch: grabs something on the go  - Dinner: Sometimes eats 2-3 bites and gets full   - Dinner: Chicken, roast beef, pork, veggies  - Drinks a lot of coffee   - Sugar free foods/drinks     Current exercise:   - None  - Has cane/walker due to back issues    Eye exam for this year?: No  Foot exam for this year?:  No    Current monitoring regimen:   Patient is using: glucometer  Type of CGM: N/A    Patient is testing blood sugars 2 times a day.    Reported blood sugars:   B: Around 97, one time was over 130    2 hours PP: Less than 180, a few over 150 but mainly under 150    , once in a great while will go > 150     Any episodes of hypoglycemia? None  - Had a day near 70 and was a little shaky    Adverse Effects:   - None    Objective     There were no vitals taken for this visit.    Allergies   Allergen Reactions    Omeprazole Hives     hives    Ace Inhibitors Hives    Bupropion Hives    Bupropion Hcl Hives     hives    Codeine Other    Penicillins Other    Sulfamethoxazole-Trimethoprim Hives       Historical Diabetes Pharmacotherapy:  - Rybelsus (in replacement of Victoza)    SECONDARY PREVENTION  - Statin? Yes   - ACE-I/ARB? No  - Aspirin?  Yes    Pertinent PMH Review:  - PMH of Pancreatitis: No  - PMH of Retinopathy: No  - PMH of Urinary Tract Infections: No  - PMH of Yeast Infections: No  - PMH of MTC: No    Lab Review  Lab Results   Component Value Date    BILITOT 0.7 10/04/2024    CALCIUM 9.0 10/07/2024    CO2 27 10/07/2024    CL 99 10/07/2024    CREATININE 0.62 10/07/2024    GLUCOSE 251 (H) 10/07/2024    ALKPHOS 61 10/04/2024    K 4.3 10/07/2024    PROT 7.9 10/04/2024     (L) 10/07/2024    AST 13 10/04/2024    ALT 17 10/04/2024    BUN 20 10/07/2024    ANIONGAP 12 10/07/2024     07/14/2022    ALBUMIN 4.5 10/04/2024    GFRF >90 07/29/2023     Lab Results   Component Value Date    TRIG 158 (H) 07/27/2024    CHOL 140 07/27/2024    HDL 47.2 07/27/2024     Lab Results   Component Value Date    HGBA1C 8.4 (H) 07/27/2024    HGBA1C 9.0 (H) 02/24/2024    HGBA1C 9.4 (A) 07/29/2023     The 10-year ASCVD risk score (Michael ESPINOZA, et al., 2019) is: 66.5%    Values used to calculate the score:      Age: 79 years      Sex: Female      Is Non- : No      Diabetic: Yes      Tobacco smoker: Yes      Systolic Blood Pressure: 136 mmHg      Is BP treated: Yes      HDL Cholesterol: 47.2 mg/dL      Total Cholesterol: 140 mg/dL    Drug Interactions:  None    Affordability/Accessibility:  -  PAP for Trulicity and Stiolto      Preferred Pharmacy:  - [facility] ([place], [place])    Assessment/Plan   Problem List Items Addressed This Visit       Type 2 diabetes mellitus with diabetic nephropathy, without long-term current use of insulin    Relevant Orders    Referral to Clinical Pharmacy       ASSESSMENT:  Patients diabetes is uncontrolled with most recent A1c of 8.4%. (Goal is < 8% due to age + other co morbidities)    Recently fell and broke pelvis. Hospital sent her home with Humalog PRN for blood sugars > 150. Her blood sugars have been running between  with a few > 150 rarely. Since she is running on the lower end, I will stop the  Humalog and have her continue other medications. If sugars start to go up, we will increase Trulicity. She also plans to get A1c done in Feb.    PLAN:  STOP Humalog   CONTINUE all other DM medications  Follow up with clinical pharmacist: 12/12/24 @ 10:40   Follow up with PCP: 2/28/25    Thank you,  Imani Borja, PharmD  Clinical Pharmacy Specialist  [phone]  [email or web address]     Continue all meds under the continuation of care with the referring provider and clinical pharmacy team.

## 2024-11-13 ENCOUNTER — PATIENT OUTREACH (OUTPATIENT)
Dept: PRIMARY CARE | Facility: CLINIC | Age: 79
End: 2024-11-13
Payer: MEDICARE

## 2024-11-13 NOTE — PROGRESS NOTES
Call regarding appt. with PCP on (   10-29-24  ) after hospitalization.  At time of outreach call the patient feels as if their condition has improved since last visit.  Reviewed the PCP appointment with the pt and addressed any questions or concerns.    Pt. Reports she is doing pretty good. She denies any questions/concerns at this time.

## 2024-11-14 ENCOUNTER — TELEMEDICINE (OUTPATIENT)
Dept: PHARMACY | Facility: HOSPITAL | Age: 79
End: 2024-11-14
Payer: MEDICARE

## 2024-11-14 DIAGNOSIS — E11.21 TYPE 2 DIABETES MELLITUS WITH DIABETIC NEPHROPATHY, WITHOUT LONG-TERM CURRENT USE OF INSULIN: ICD-10-CM

## 2024-11-19 PROCEDURE — RXMED WILLOW AMBULATORY MEDICATION CHARGE

## 2024-11-21 ENCOUNTER — PHARMACY VISIT (OUTPATIENT)
Dept: PHARMACY | Facility: CLINIC | Age: 79
End: 2024-11-21
Payer: COMMERCIAL

## 2024-11-29 ENCOUNTER — PATIENT OUTREACH (OUTPATIENT)
Dept: PRIMARY CARE | Facility: CLINIC | Age: 79
End: 2024-11-29
Payer: MEDICARE

## 2024-11-29 NOTE — PROGRESS NOTES
Successful outreach to patient regarding hospitalization as patient continues TCM program.   At time of outreach call the patient feels as if their condition has improved since initial visit with PCP or specialist.  Questions or concerns addressed at this time with patient.   Provided contact information to patient if any further non-emergent needs arise.     Pt. Reports that PT is going pretty good, they think she is doing great. She reports she still has some pain, but nothing like it was. She is getting better.

## 2024-12-03 ENCOUNTER — TELEPHONE (OUTPATIENT)
Dept: PRIMARY CARE | Facility: CLINIC | Age: 79
End: 2024-12-03
Payer: MEDICARE

## 2024-12-03 DIAGNOSIS — S32.599S: ICD-10-CM

## 2024-12-03 NOTE — TELEPHONE ENCOUNTER
Nurse called stating pt is complaining of indigestion and is allergic to omeprazole and is currently not taking anything for it.    Also pt is complaining of Left ear pain and dizziness.    Please call pt back with any directions Thx  Please advise Thx

## 2024-12-10 ENCOUNTER — HOSPITAL ENCOUNTER (OUTPATIENT)
Dept: RADIOLOGY | Facility: HOSPITAL | Age: 79
Discharge: HOME | End: 2024-12-10
Payer: MEDICARE

## 2024-12-10 ENCOUNTER — OFFICE VISIT (OUTPATIENT)
Dept: ORTHOPEDIC SURGERY | Facility: HOSPITAL | Age: 79
End: 2024-12-10
Payer: MEDICARE

## 2024-12-10 VITALS — WEIGHT: 128 LBS | BODY MASS INDEX: 28.79 KG/M2 | HEIGHT: 56 IN

## 2024-12-10 DIAGNOSIS — S32.599S: Primary | ICD-10-CM

## 2024-12-10 DIAGNOSIS — S32.599S: ICD-10-CM

## 2024-12-10 PROCEDURE — 99214 OFFICE O/P EST MOD 30 MIN: CPT | Performed by: SPECIALIST

## 2024-12-10 PROCEDURE — 1159F MED LIST DOCD IN RCRD: CPT | Performed by: SPECIALIST

## 2024-12-10 PROCEDURE — 73502 X-RAY EXAM HIP UNI 2-3 VIEWS: CPT | Mod: LT

## 2024-12-10 PROCEDURE — 73502 X-RAY EXAM HIP UNI 2-3 VIEWS: CPT | Mod: LEFT SIDE | Performed by: RADIOLOGY

## 2024-12-10 NOTE — PROGRESS NOTES
Closed fracture of multiple pubic rami,  DOI 10/2/24  XR done today   Patient has been doing well with PT able to walk with a walker around the house she can take a few steps without walker assistance Patient completed OT.    Exam: Patient is stable in stance with neutral pelvis.  Passive motion left hip is with minimal pain.  No pain with compression of the iliac wings or pubic symphysis.  Distal neurovascular intact negative Homans.    Radiographs: Views of the pelvis today show callus healing of the pubic ramus fracture.  No other acute changes.    Assessment plan: Healing pubic rami fractures.  She will continue to progress with activities as tolerated.  Follow-up if no improvement.

## 2024-12-12 ENCOUNTER — APPOINTMENT (OUTPATIENT)
Dept: PHARMACY | Facility: HOSPITAL | Age: 79
End: 2024-12-12
Payer: MEDICARE

## 2024-12-17 PROCEDURE — RXMED WILLOW AMBULATORY MEDICATION CHARGE

## 2024-12-20 ENCOUNTER — PHARMACY VISIT (OUTPATIENT)
Dept: PHARMACY | Facility: CLINIC | Age: 79
End: 2024-12-20
Payer: COMMERCIAL

## 2024-12-26 ENCOUNTER — ANCILLARY PROCEDURE (OUTPATIENT)
Dept: URGENT CARE | Age: 79
End: 2024-12-26
Payer: MEDICARE

## 2024-12-26 ENCOUNTER — OFFICE VISIT (OUTPATIENT)
Dept: URGENT CARE | Age: 79
End: 2024-12-26
Payer: MEDICARE

## 2024-12-26 VITALS
SYSTOLIC BLOOD PRESSURE: 137 MMHG | DIASTOLIC BLOOD PRESSURE: 65 MMHG | HEART RATE: 71 BPM | OXYGEN SATURATION: 97 % | TEMPERATURE: 98.2 F | RESPIRATION RATE: 20 BRPM

## 2024-12-26 DIAGNOSIS — J06.9 UPPER RESPIRATORY TRACT INFECTION, UNSPECIFIED TYPE: ICD-10-CM

## 2024-12-26 DIAGNOSIS — J06.9 UPPER RESPIRATORY TRACT INFECTION, UNSPECIFIED TYPE: Primary | ICD-10-CM

## 2024-12-26 PROCEDURE — 71046 X-RAY EXAM CHEST 2 VIEWS: CPT

## 2024-12-26 RX ORDER — BENZONATATE 100 MG/1
100 CAPSULE ORAL 3 TIMES DAILY PRN
Qty: 42 CAPSULE | Refills: 0 | Status: SHIPPED | OUTPATIENT
Start: 2024-12-26 | End: 2025-01-25

## 2024-12-26 RX ORDER — PREDNISONE 20 MG/1
40 TABLET ORAL DAILY
Qty: 10 TABLET | Refills: 0 | Status: SHIPPED | OUTPATIENT
Start: 2024-12-26 | End: 2024-12-31

## 2024-12-26 RX ORDER — ALBUTEROL SULFATE 90 UG/1
2 INHALANT RESPIRATORY (INHALATION) EVERY 4 HOURS PRN
Qty: 8 G | Refills: 0 | Status: SHIPPED | OUTPATIENT
Start: 2024-12-26 | End: 2025-12-26

## 2024-12-26 NOTE — PROGRESS NOTES
Subjective   Patient ID: Funmilayo Montenegro is a 79 y.o. female. They present today with a chief complaint of Cough (Chest congestion and cough for almost 2 weeks).    History of Present Illness  HPI a 79-year-old female with multiple comorbidities arrives to the clinic with chief complaint of cough and chest congestion.  The patient reports having the symptoms over the last 2 weeks.  She states that she was exposed to her grandson who was diagnosed with influenza A.  Shortly after, she started having symptoms of cough, congestion, shortness of breath.  She does have a history of COPD due to history of smoking in the past.  She is currently non-smoker.  She is here for further evaluation health maintenance.    Past Medical History  Allergies as of 12/26/2024 - Reviewed 12/26/2024   Allergen Reaction Noted    Omeprazole Hives 08/01/2023    Ace inhibitors Hives 08/01/2023    Bupropion Hives 08/01/2023    Bupropion hcl Hives 08/01/2023    Codeine Other 08/01/2023    Penicillins Other 08/01/2023    Sulfamethoxazole-trimethoprim Hives 08/01/2023       (Not in a hospital admission)       History reviewed. No pertinent past medical history.    History reviewed. No pertinent surgical history.     reports that she has been smoking cigarettes. She started smoking about 61 years ago. She has a 45.7 pack-year smoking history. She has never used smokeless tobacco. She reports that she does not drink alcohol and does not use drugs.    Review of Systems  Review of Systems    Cough, congestion, wheezing  Objective    Vitals:    12/26/24 1403   BP: 137/65   Pulse: 71   Resp: 20   Temp: 36.8 °C (98.2 °F)   SpO2: 97%     No LMP recorded.    Physical Exam  Diminished lung sounds in left and right lower lobes.  Procedures    Point of Care Test & Imaging Results from this visit  No results found for this visit on 12/26/24.   XR chest 2 views    Result Date: 12/26/2024  Interpreted By:  Will Bobo, STUDY: XR CHEST 2 VIEWS  12/26/2024  2:56 pm   INDICATION: Signs/Symptoms:cough   COMPARISON: 10/04/2024   ACCESSION NUMBER(S): [id number]   ORDERING CLINICIAN: SARAHY LANE   TECHNIQUE: PA and lateral views of the chest were obtained.   FINDINGS: No focal infiltrate, pleural effusion or pneumothorax is identified. The cardiac silhouette is within normal limits for size.  Mild discogenic degenerative changes are seen throughout the thoracic spine.       No focal infiltrate or pneumothorax.   MACRO: None.   Signed by: Will Bobo 12/26/2024 3:46 PM Dictation workstation:   [id number]     Diagnostic study results (if any) were reviewed by MATILDA Caraballo.    Assessment/Plan   Allergies, medications, history, and pertinent labs/EKGs/Imaging reviewed by MATILDA Caraballo.     Medical Decision Making  Upon initial assessment, the patient was sitting calmly at bedside chair in no acute distress.    Physical examination reveals no respiratory distress, airway obstruction however there is evidence of diminished lung sounds in the left and right lower lobes suggestive chronic COPD.  However given her age, comorbidities, and longevity of her symptoms, and is reasonable to rule out any cardiopulmonary processes with a two-view chest x-ray.    2 view chest x-ray: Negative    Given the results, likely viral URI.  Prednisone and benzonatate Perles sent.  Albuterol inhaler sent.  Follow-up with primary care provider.    As a result of the work-up, the patient was discharged home.  she was informed of her diagnosis and instructed to come back with any concerns or worsening of condition.  she and was agreeable to the plan as discussed above.  she was given the opportunity to ask questions.  All of the patient's questions were answered.    This document was generated using the assistance of voice recognition software. If there are any errors of spelling, grammar, syntax, or meaning; please feel free to contact me directly for clarification.      Orders and Diagnoses  Diagnoses and all orders for this visit:  Upper respiratory tract infection, unspecified type  -     XR chest 2 views; Future  -     predniSONE (Deltasone) 20 mg tablet; Take 2 tablets (40 mg) by mouth once daily for 5 days.  -     albuterol (Ventolin HFA) 90 mcg/actuation inhaler; Inhale 2 puffs every 4 hours if needed for wheezing or shortness of breath.  -     benzonatate (Tessalon) 100 mg capsule; Take 1 capsule (100 mg) by mouth 3 times a day as needed for cough. Do not crush or chew.      Medical Admin Record      Patient disposition: Home    Electronically signed by MATILDA Caraballo  3:58 PM

## 2024-12-28 ENCOUNTER — HOSPITAL ENCOUNTER (OUTPATIENT)
Dept: RADIOLOGY | Facility: HOSPITAL | Age: 79
Discharge: HOME | End: 2024-12-28
Payer: MEDICARE

## 2024-12-28 DIAGNOSIS — F17.219 CIGARETTE NICOTINE DEPENDENCE WITH NICOTINE-INDUCED DISORDER: ICD-10-CM

## 2024-12-28 PROCEDURE — 76380 CAT SCAN FOLLOW-UP STUDY: CPT | Performed by: STUDENT IN AN ORGANIZED HEALTH CARE EDUCATION/TRAINING PROGRAM

## 2024-12-28 PROCEDURE — 71250 CT THORAX DX C-: CPT

## 2024-12-31 ENCOUNTER — TELEPHONE (OUTPATIENT)
Dept: PRIMARY CARE | Facility: CLINIC | Age: 79
End: 2024-12-31

## 2024-12-31 NOTE — TELEPHONE ENCOUNTER
----- Message from Keith Glover sent at 12/30/2024  6:52 PM EST -----  Inform pt that her chest CT showed continued tiny lung nodules with a few new ones but they all appear to be benign. Some of the walls of the lungs appear to be a little thickened and there is some evidence of emphysema. There is some continued calcification of the arteries of her heart.  All of this should be monitored for changes and the radiologist recommends she have a repeat Chest CT in 6 months. Discuss with Dr Calhoun at her follow up appointment in a couple months.

## 2025-01-08 PROCEDURE — RXMED WILLOW AMBULATORY MEDICATION CHARGE

## 2025-01-09 ENCOUNTER — PHARMACY VISIT (OUTPATIENT)
Dept: PHARMACY | Facility: CLINIC | Age: 80
End: 2025-01-09
Payer: COMMERCIAL

## 2025-01-14 NOTE — PROGRESS NOTES
Pharmacist Clinic: Diabetes Management  Funmilayo Montenegro is a 79 y.o. female was referred to Clinical Pharmacy Team for diabetes management.   Referring Provider: Keith Glover PA-C  - Last visit with referring provider: 8/1/24     Patient Assistance for Trulicity and Stiolto approved through 7/29/25. Will have to be renewed prior to that date to prevent lapse in coverage. Medication(s) will be received at no cost to patient from [facility].       Subjective     HPI    Current Diabetes Pharmacotherapy:    - Glimepiride 4 mg BID AC  - Trulicity 0.75 mg weekly - Mondays  - Metformin 1000 mg BID  - Pioglitazone 15 mg daily    Social History:  Current diet:   - Breakfast  - Lunch: grabs something on the go  - Dinner: Sometimes eats 2-3 bites and gets full   - Dinner: Chicken, roast beef, pork, veggies  - Drinks a lot of coffee   - Sugar free foods/drinks     Current exercise:   - None  - Has cane/walker due to back issues    Eye exam for this year?: No  Foot exam for this year?:  No    Current monitoring regimen:   Patient is using: glucometer  Type of CGM: N/A    Patient is testing blood sugars 2 times a day.    Reported blood sugars:   Fasting: < 100     2 hours PP: A few > 200 but not many, 140-150    Any episodes of hypoglycemia? None    Adverse Effects:   - None    Objective     There were no vitals taken for this visit.    Allergies   Allergen Reactions    Omeprazole Hives     hives    Ace Inhibitors Hives    Bupropion Hives    Bupropion Hcl Hives     hives    Codeine Other    Penicillins Other    Sulfamethoxazole-Trimethoprim Hives       Historical Diabetes Pharmacotherapy:  - Rybelsus (in replacement of Victoza)    SECONDARY PREVENTION  - Statin? Yes   - ACE-I/ARB? No  - Aspirin? Yes    Pertinent PMH Review:  - PMH of Pancreatitis: No  - PMH of Retinopathy: No  - PMH of Urinary Tract Infections: No  - PMH of Yeast Infections: No  - PMH of MTC: No    Lab Review  Lab Results   Component Value  Date    BILITOT 0.7 10/04/2024    CALCIUM 9.0 10/07/2024    CO2 27 10/07/2024    CL 99 10/07/2024    CREATININE 0.62 10/07/2024    GLUCOSE 251 (H) 10/07/2024    ALKPHOS 61 10/04/2024    K 4.3 10/07/2024    PROT 7.9 10/04/2024     (L) 10/07/2024    AST 13 10/04/2024    ALT 17 10/04/2024    BUN 20 10/07/2024    ANIONGAP 12 10/07/2024     07/14/2022    ALBUMIN 4.5 10/04/2024    GFRF >90 07/29/2023     Lab Results   Component Value Date    TRIG 158 (H) 07/27/2024    CHOL 140 07/27/2024    HDL 47.2 07/27/2024     Lab Results   Component Value Date    HGBA1C 8.4 (H) 07/27/2024    HGBA1C 9.0 (H) 02/24/2024    HGBA1C 9.4 (A) 07/29/2023     The 10-year ASCVD risk score (Michael ESPINOZA, et al., 2019) is: 67.1%    Values used to calculate the score:      Age: 79 years      Sex: Female      Is Non- : No      Diabetic: Yes      Tobacco smoker: Yes      Systolic Blood Pressure: 137 mmHg      Is BP treated: Yes      HDL Cholesterol: 47.2 mg/dL      Total Cholesterol: 140 mg/dL    Drug Interactions:  None    Affordability/Accessibility:  - [facility] for Trulicity and Stiolto      Preferred Pharmacy:  - [facility] ([place], [place])    Assessment/Plan   Problem List Items Addressed This Visit       Type 2 diabetes mellitus with diabetic nephropathy, without long-term current use of insulin    Type 2 diabetes mellitus without complication, without long-term current use of insulin (Multi)    Relevant Orders    Referral to Clinical Pharmacy       ASSESSMENT:  Patients diabetes is uncontrolled with most recent A1c of 8.4%. (Goal is < 8% due to age + other co morbidities)    Patient's blood sugars doing very well. Reports no hypoglycemia. Has had a few episodes in afternoon where she was > 200 but not often. Will continue her current regimen. She is due for an A1c so we will see what that comes back as as well.     PLAN:  CONTINUE all DM medications  Follow up with clinical pharmacist: 3/12/25 @ 9:40   Follow up  with PCP: 2/28/25    Thank you,  Imani Borja, PharmD  Clinical Pharmacy Specialist  [phone]  [email or web address]     Continue all meds under the continuation of care with the referring provider and clinical pharmacy team.

## 2025-01-15 ENCOUNTER — APPOINTMENT (OUTPATIENT)
Dept: PHARMACY | Facility: HOSPITAL | Age: 80
End: 2025-01-15
Payer: MEDICARE

## 2025-01-15 DIAGNOSIS — E11.21 TYPE 2 DIABETES MELLITUS WITH DIABETIC NEPHROPATHY, WITHOUT LONG-TERM CURRENT USE OF INSULIN: ICD-10-CM

## 2025-01-15 DIAGNOSIS — E11.9 TYPE 2 DIABETES MELLITUS WITHOUT COMPLICATION, WITHOUT LONG-TERM CURRENT USE OF INSULIN (MULTI): ICD-10-CM

## 2025-01-16 PROCEDURE — RXMED WILLOW AMBULATORY MEDICATION CHARGE

## 2025-01-20 ENCOUNTER — PHARMACY VISIT (OUTPATIENT)
Dept: PHARMACY | Facility: CLINIC | Age: 80
End: 2025-01-20
Payer: COMMERCIAL

## 2025-01-23 DIAGNOSIS — E11.9 TYPE 2 DIABETES MELLITUS WITHOUT COMPLICATION, WITHOUT LONG-TERM CURRENT USE OF INSULIN (MULTI): ICD-10-CM

## 2025-01-23 RX ORDER — PIOGLITAZONEHYDROCHLORIDE 15 MG/1
15 TABLET ORAL DAILY
Qty: 30 TABLET | Refills: 0 | Status: SHIPPED | OUTPATIENT
Start: 2025-01-23

## 2025-01-23 NOTE — TELEPHONE ENCOUNTER
Pt's son asking if short supply on:    Pioglitazone    Send to:    [facility], [place] [address]    Last OV: 10/29/24  Next OV: 02/28/25    Reason for short supply is the rx usually goes through a mail in service how the med is stuck in [place] and not get to the pt by Monday.  Please advise

## 2025-01-28 ENCOUNTER — PATIENT OUTREACH (OUTPATIENT)
Dept: PRIMARY CARE | Facility: CLINIC | Age: 80
End: 2025-01-28
Payer: MEDICARE

## 2025-01-28 NOTE — PROGRESS NOTES
Final call. Called and spoke with patient to address any questions or concerns regarding hospitalization.   Patient reports their condition has improved.   Patient encouraged to keep my contact information in case any needs arise.     Pt. Denies questions/concerns at this time.

## 2025-01-31 NOTE — PROGRESS NOTES
Pharmacist Clinic: Post Discharge Initial Visit  Funmilayo Montenegro is a 79 y.o. female was referred to Clinical Pharmacy Team for diabetes management.     Referring Provider: Ar Jimenez DO     HISTORY OF PRESENT ILLNESS  Patient was on Victoza but her pharmacies have been out of stock due to shortages.  She was switched to Rybelsus in the interim which has not been providing adequate control.      LAB REVIEW   Glucose (mg/dL)   Date Value   02/24/2024 201 (H)   07/29/2023 190 (H)   02/04/2023 242 (H)   08/12/2022 168 (H)     Hemoglobin A1C (%)   Date Value   02/24/2024 9.0 (H)   07/29/2023 9.4 (A)   02/04/2023 8.6 (A)   08/12/2022 8.0 (A)     Bicarbonate (mmol/L)   Date Value   02/24/2024 28   07/29/2023 27   02/04/2023 32   08/12/2022 30     Urea Nitrogen (mg/dL)   Date Value   02/24/2024 20   07/29/2023 13   02/04/2023 11   08/12/2022 9     Creatinine (mg/dL)   Date Value   02/24/2024 0.64   07/29/2023 0.60   02/04/2023 0.54   08/12/2022 0.52     Lab Results   Component Value Date    HGBA1C 9.0 (H) 02/24/2024    HGBA1C 9.4 (A) 07/29/2023    HGBA1C 8.6 (A) 02/04/2023     Lab Results   Component Value Date    CHOL 107 02/24/2024    CHOL 109 07/29/2023    CHOL 101 08/12/2022     Lab Results   Component Value Date    HDL 39.1 02/24/2024    HDL 36.7 (A) 07/29/2023    HDL 29.1 (A) 08/12/2022     Lab Results   Component Value Date    LDLCALC 50 02/24/2024     Lab Results   Component Value Date    TRIG 92 02/24/2024    TRIG 112 07/29/2023    TRIG 219 (H) 08/12/2022       DIABETES ASSESSMENT    CURRENT PHARMACOTHERAPY  - glimepiride 4 mg twice daily  -metformin 1000mg twice daily  -pioglitazone 15 mg daily  -Rybelsus 3 mg daily    SECONDARY PREVENTION  - Statin? Yes  - ACE-I/ARB? No  - Aspirin? Yes    HISTORICAL PHARMACOTHERAPY  - Victoza 1.8 mg daily    SMBG MEASUREMENTS  A few days ago    Reported that when on Victoza -200s    Patient does not report symptoms of hypoglycemia.   Patient does not  report symptoms of hyperglycemia.       RECOMMENDATIONS/PLAN  1. Patients diabetes is poorly controlled with most recent A1c of 9% (goal < 7 %).   - Was able to find Victoza available, patient cannot receive mail order with PO box but is able to  at  [facility].  Prescription sent.     2. Education:   - Counseled patient on MOA, expectations, side effects, duration of therapy, contraindications, administration, and monitoring parameters  - Answered all patient questions and concerns    Clinical Pharmacist follow up: 5/31/24 Vinh Virk PharmD  Clinical Pharmacy Specialist, Primary Care   [phone]    Continue all meds under the continuation of care with the referring provider and clinical pharmacy team.    Verbal consent to manage patient's drug therapy was obtained from [the patient or an individual authorized to act on behalf of a patient]. They were informed they may decline to participate or withdraw from participation in pharmacy services at any time.     Awake/Alert

## 2025-02-04 DIAGNOSIS — E11.9 TYPE 2 DIABETES MELLITUS WITHOUT COMPLICATION, WITHOUT LONG-TERM CURRENT USE OF INSULIN (MULTI): Primary | ICD-10-CM

## 2025-02-04 RX ORDER — PEN NEEDLE, DIABETIC 30 GX 1/3"
NEEDLE, DISPOSABLE MISCELLANEOUS
Qty: 100 EACH | Refills: 3 | Status: SHIPPED | OUTPATIENT
Start: 2025-02-04

## 2025-02-04 RX ORDER — BLOOD SUGAR DIAGNOSTIC
STRIP MISCELLANEOUS
Qty: 100 STRIP | Refills: 3 | Status: SHIPPED | OUTPATIENT
Start: 2025-02-04

## 2025-02-04 NOTE — TELEPHONE ENCOUNTER
Pt called requesting pended meds    Next  - [facility] 2/28  Pt has not received these rx's in a long time, but insists she needs them.   Pt uses [facility]

## 2025-02-14 PROCEDURE — RXMED WILLOW AMBULATORY MEDICATION CHARGE

## 2025-02-17 ENCOUNTER — PHARMACY VISIT (OUTPATIENT)
Dept: PHARMACY | Facility: CLINIC | Age: 80
End: 2025-02-17
Payer: COMMERCIAL

## 2025-02-28 ENCOUNTER — APPOINTMENT (OUTPATIENT)
Dept: PRIMARY CARE | Facility: CLINIC | Age: 80
End: 2025-02-28
Payer: MEDICARE

## 2025-03-06 ENCOUNTER — APPOINTMENT (OUTPATIENT)
Dept: PRIMARY CARE | Facility: CLINIC | Age: 80
End: 2025-03-06
Payer: MEDICARE

## 2025-03-06 VITALS
DIASTOLIC BLOOD PRESSURE: 74 MMHG | WEIGHT: 119 LBS | HEART RATE: 92 BPM | TEMPERATURE: 97.2 F | BODY MASS INDEX: 26.68 KG/M2 | OXYGEN SATURATION: 93 % | SYSTOLIC BLOOD PRESSURE: 126 MMHG

## 2025-03-06 DIAGNOSIS — E78.2 MIXED HYPERLIPIDEMIA: ICD-10-CM

## 2025-03-06 DIAGNOSIS — D72.829 LEUKOCYTOSIS, UNSPECIFIED TYPE: ICD-10-CM

## 2025-03-06 DIAGNOSIS — M81.0 AGE-RELATED OSTEOPOROSIS WITHOUT CURRENT PATHOLOGICAL FRACTURE: Primary | ICD-10-CM

## 2025-03-06 DIAGNOSIS — J30.9 ALLERGIC RHINITIS, UNSPECIFIED SEASONALITY, UNSPECIFIED TRIGGER: ICD-10-CM

## 2025-03-06 DIAGNOSIS — E11.9 TYPE 2 DIABETES MELLITUS WITHOUT COMPLICATION, WITHOUT LONG-TERM CURRENT USE OF INSULIN (MULTI): ICD-10-CM

## 2025-03-06 PROCEDURE — 3078F DIAST BP <80 MM HG: CPT | Performed by: FAMILY MEDICINE

## 2025-03-06 PROCEDURE — 1159F MED LIST DOCD IN RCRD: CPT | Performed by: FAMILY MEDICINE

## 2025-03-06 PROCEDURE — G2211 COMPLEX E/M VISIT ADD ON: HCPCS | Performed by: FAMILY MEDICINE

## 2025-03-06 PROCEDURE — 1160F RVW MEDS BY RX/DR IN RCRD: CPT | Performed by: FAMILY MEDICINE

## 2025-03-06 PROCEDURE — 3074F SYST BP LT 130 MM HG: CPT | Performed by: FAMILY MEDICINE

## 2025-03-06 PROCEDURE — 99214 OFFICE O/P EST MOD 30 MIN: CPT | Performed by: FAMILY MEDICINE

## 2025-03-06 RX ORDER — LORATADINE 10 MG/1
10 TABLET ORAL DAILY
Qty: 90 TABLET | Refills: 1 | Status: SHIPPED | OUTPATIENT
Start: 2025-03-06 | End: 2025-09-02

## 2025-03-06 RX ORDER — ALENDRONATE SODIUM 70 MG/1
70 TABLET ORAL
Qty: 12 TABLET | Refills: 1 | Status: SHIPPED | OUTPATIENT
Start: 2025-03-06 | End: 2025-09-02

## 2025-03-06 ASSESSMENT — ENCOUNTER SYMPTOMS
ABDOMINAL PAIN: 0
SHORTNESS OF BREATH: 0
ACTIVITY CHANGE: 0
SLEEP DISTURBANCE: 0
APPETITE CHANGE: 0
FEVER: 0
LIGHT-HEADEDNESS: 0
CHILLS: 0
DIZZINESS: 0

## 2025-03-06 NOTE — PROGRESS NOTES
Subjective   Patient ID: Funmilayo Montenegro is a 79 y.o. female who presents for Hypertension, Diabetes, COPD, and Depression (Recheck.).    HPI   Hospitalizations  On 10/4/24 sustained a fall when she was changing her clothes.  Went to the hospital due to severe groin pain.  CT of pelvis showed acute nondisplaced fractures of the anterior pelvic arch.  transferred to [facility] ([facility] for rehab. Finised PT/OT. Follwoed up with ortho     Pt ha sbeen havigg dizziness when she gets up too soon. Nnot logging fasting glucose at home   DM2  Taking glimepiride 4 mg bid, Metformin 1000 mg bid, Actos every day,  and Trulicity last visit.   Son has been giving her Humalog sliding scale if sugars >150 (4u) but hasn't needed to use since out of the nursing home. Is more active now.   Lab Results   Component Value Date    HGBA1C 8.4 (H) 07/27/2024     HTN/CAD:  Controlled.  Taking Carvedilol 6.25 mg BID, Imdur 60 mg, amlodipine 10 mg, ASA 81mg every day.      Depression: controlled with sertraline.      Osteoporosis: Last DEXA 9/4/24.  Spine Tscore -2.5, Left femur -2.8.   Has never been on meds for this     Hasn't smoked since 10/4/24.     Review of Systems   Constitutional:  Negative for activity change, appetite change, chills and fever.   Eyes:  Negative for visual disturbance.   Respiratory:  Negative for shortness of breath.    Cardiovascular:  Negative for chest pain.   Gastrointestinal:  Negative for abdominal pain.   Skin:  Negative for rash.   Neurological:  Negative for dizziness and light-headedness.   Psychiatric/Behavioral:  Negative for sleep disturbance.        Objective   /74   Pulse 92   Temp 36.2 °C (97.2 °F)   Wt 54 kg (119 lb)   SpO2 93%   BMI 26.68 kg/m²     Physical Exam  Constitutional:       Appearance: Normal appearance.   Eyes:      Pupils: Pupils are equal, round, and reactive to light.   Cardiovascular:      Rate and Rhythm: Normal rate and regular rhythm.   Pulmonary:       Effort: Pulmonary effort is normal.      Breath sounds: Normal breath sounds.   Abdominal:      General: Abdomen is flat. Bowel sounds are normal.      Palpations: Abdomen is soft.   Musculoskeletal:         General: Normal range of motion.   Skin:     General: Skin is warm.   Neurological:      General: No focal deficit present.      Mental Status: She is alert.   Psychiatric:         Mood and Affect: Mood normal.       Assessment/Plan   Assessment & Plan  Allergic rhinitis, unspecified seasonality, unspecified trigger    Orders:    loratadine (Claritin) 10 mg tablet; Take 1 tablet (10 mg) by mouth once daily.    Type 2 diabetes mellitus without complication, without long-term current use of insulin (Multi)  Discussed stopping sliding scale, changing her diet, and bringing glucose log with her on close follow up. If A1c cont to be high, will increase trulicity  Orders:    Hemoglobin A1c; Future    Comprehensive Metabolic Panel; Future    Leukocytosis, unspecified type    Orders:    CBC and Auto Differential; Future    Mixed hyperlipidemia  Desirable range <100 mg/dL for primary prevention; <70 mg/dL for patients with CHD or diabetic patients with > or = 2 CHD risk factors.     Orders:    Comprehensive Metabolic Panel; Future    Lipid Panel; Future    Age-related osteoporosis without current pathological fracture  Started pt on alendronate  Discussed risks vs benefit of starting meds and patient willing to accept the risks     Orders:    alendronate (Fosamax) 70 mg tablet; Take 1 tablet (70 mg) by mouth every 7 days. Take in the morning with a full glass of water, on an empty stomach, and do not take anything else by mouth or lie down for the next 30 min.

## 2025-03-06 NOTE — PATIENT INSTRUCTIONS
I started you on a new med today called alendronate. Pls follow the instructions on the bottle well. This is once a week med.     Start keeping track of your headache and dizziness. Check bp at home and blood sugar. Pls write down the readings and bring the logs with you next visit. Get fasting blood work orders before the next visit

## 2025-03-11 NOTE — ASSESSMENT & PLAN NOTE
Discussed stopping sliding scale, changing her diet, and bringing glucose log with her on close follow up. If A1c cont to be high, will increase trulicity  Orders:    Hemoglobin A1c; Future    Comprehensive Metabolic Panel; Future

## 2025-03-11 NOTE — ASSESSMENT & PLAN NOTE
Desirable range <100 mg/dL for primary prevention; <70 mg/dL for patients with CHD or diabetic patients with > or = 2 CHD risk factors.     Orders:    Comprehensive Metabolic Panel; Future    Lipid Panel; Future

## 2025-03-11 NOTE — ASSESSMENT & PLAN NOTE
Started pt on alendronate  Discussed risks vs benefit of starting meds and patient willing to accept the risks     Orders:    alendronate (Fosamax) 70 mg tablet; Take 1 tablet (70 mg) by mouth every 7 days. Take in the morning with a full glass of water, on an empty stomach, and do not take anything else by mouth or lie down for the next 30 min.

## 2025-03-11 NOTE — PROGRESS NOTES
Pharmacist Clinic: Diabetes Management  Funmilayo Montenegro is a 79 y.o. female was referred to Clinical Pharmacy Team for diabetes management.   Referring Provider: Keith Glover PA-C  - Last visit with referring provider: 24     Patient Assistance for Trulicity and Stiolto approved through 25. Will have to be renewed prior to that date to prevent lapse in coverage. Medication(s) will be received at no cost to patient from [facility].       Subjective     HPI    Current Diabetes Pharmacotherapy:    - Glimepiride 4 mg BID AC  - Trulicity 0.75 mg weekly -   - Metformin 1000 mg BID  - Pioglitazone 15 mg daily    Social History:  Current diet:   - Breakfast  - Lunch: grabs something on the go  - Dinner: Sometimes eats 2-3 bites and gets full   - Dinner: Chicken, roast beef, pork, veggies  - Drinks a lot of coffee   - Sugar free foods/drinks   - Cookies at night    Current exercise:   - None  - Has cane/walker due to back issues    Eye exam for this year?: No  Foot exam for this year?:  No    Current monitoring regimen:   Patient is using: glucometer  Type of CGM: N/A    Patient is testing blood sugars 2 times a day.    Reported blood sugars:   Fastin, most mornings on average of 100     2 hours PP: 167, 229    Any episodes of hypoglycemia? Yes  - Some a lows here and there in morning but not frequent   - 3 times a month if that    Adverse Effects:   - None    Objective     There were no vitals taken for this visit.    Allergies   Allergen Reactions    Omeprazole Hives     hives    Ace Inhibitors Hives    Bupropion Hives    Bupropion Hcl Hives     hives    Codeine Other    Penicillins Other    Sulfamethoxazole-Trimethoprim Hives       Historical Diabetes Pharmacotherapy:  - Rybelsus (in replacement of Victoza)    SECONDARY PREVENTION  - Statin? Yes   - ACE-I/ARB? No  - Aspirin? Yes    Pertinent PMH Review:  - PMH of Pancreatitis: No  - PMH of Retinopathy: No  - PMH of Urinary Tract  Infections: No  - PMH of Yeast Infections: No  - PMH of MTC: No    Lab Review  Lab Results   Component Value Date    BILITOT 0.7 10/04/2024    CALCIUM 9.0 10/07/2024    CO2 27 10/07/2024    CL 99 10/07/2024    CREATININE 0.62 10/07/2024    GLUCOSE 251 (H) 10/07/2024    ALKPHOS 61 10/04/2024    K 4.3 10/07/2024    PROT 7.9 10/04/2024     (L) 10/07/2024    AST 13 10/04/2024    ALT 17 10/04/2024    BUN 20 10/07/2024    ANIONGAP 12 10/07/2024     07/14/2022    ALBUMIN 4.5 10/04/2024    GFRF >90 07/29/2023     Lab Results   Component Value Date    TRIG 158 (H) 07/27/2024    CHOL 140 07/27/2024    HDL 47.2 07/27/2024     Lab Results   Component Value Date    HGBA1C 8.4 (H) 07/27/2024    HGBA1C 9.0 (H) 02/24/2024    HGBA1C 9.4 (A) 07/29/2023     The 10-year ASCVD risk score (Michael ESPINOZA, et al., 2019) is: 60.9%    Values used to calculate the score:      Age: 79 years      Sex: Female      Is Non- : No      Diabetic: Yes      Tobacco smoker: Yes      Systolic Blood Pressure: 126 mmHg      Is BP treated: Yes      HDL Cholesterol: 47.2 mg/dL      Total Cholesterol: 140 mg/dL    Drug Interactions:  - None    Affordability/Accessibility:  -  PAP for Trulicity and Stiolto      Preferred Pharmacy:  - [facility] ([place], [place])    Assessment/Plan   Problem List Items Addressed This Visit       Type 2 diabetes mellitus without complication, without long-term current use of insulin (Multi)    Relevant Orders    Referral to Clinical Pharmacy     ASSESSMENT:  Patients diabetes is uncontrolled with most recent A1c of 8.4%. (Goal is < 8% due to age + other co morbidities) - -160, 2HPP <210    Spoke with patient's son today. Her blood sugars are stable. Plans to get A1c done before seeing Dr. Calhoun on 4/22. Denies any recurring hypoglycemia. Had maybe 3 episodes this month but rare that is happens per son. Denies any side effects as well. For now I will leave medications as is and wait to see  what A1c comes to.     Patient accidentally left cap on 2 of her Trulicity pens when injecting and therefore down 2 pens before next fill. Will reach out to [facility] about this.    PLAN:  CONTINUE all DM medications  Follow up with clinical pharmacist: 4/29/25 @ 9:20   Follow up with PCP: 4/22/25    Thank you,  Imani Borja, PharmD  Clinical Pharmacy Specialist  [phone]  [email or web address]     Continue all meds under the continuation of care with the referring provider and clinical pharmacy team.

## 2025-03-12 ENCOUNTER — APPOINTMENT (OUTPATIENT)
Dept: PHARMACY | Facility: HOSPITAL | Age: 80
End: 2025-03-12
Payer: MEDICARE

## 2025-03-12 DIAGNOSIS — E11.9 TYPE 2 DIABETES MELLITUS WITHOUT COMPLICATION, WITHOUT LONG-TERM CURRENT USE OF INSULIN (MULTI): ICD-10-CM

## 2025-03-14 PROCEDURE — RXMED WILLOW AMBULATORY MEDICATION CHARGE

## 2025-03-17 ENCOUNTER — PHARMACY VISIT (OUTPATIENT)
Dept: PHARMACY | Facility: CLINIC | Age: 80
End: 2025-03-17
Payer: COMMERCIAL

## 2025-03-19 ENCOUNTER — PHARMACY VISIT (OUTPATIENT)
Dept: PHARMACY | Facility: CLINIC | Age: 80
End: 2025-03-19
Payer: COMMERCIAL

## 2025-03-19 PROCEDURE — RXMED WILLOW AMBULATORY MEDICATION CHARGE

## 2025-04-22 ENCOUNTER — APPOINTMENT (OUTPATIENT)
Dept: RADIOLOGY | Facility: HOSPITAL | Age: 80
End: 2025-04-22
Payer: MEDICARE

## 2025-04-22 ENCOUNTER — APPOINTMENT (OUTPATIENT)
Dept: PRIMARY CARE | Facility: CLINIC | Age: 80
End: 2025-04-22
Payer: MEDICARE

## 2025-04-22 ENCOUNTER — HOSPITAL ENCOUNTER (OUTPATIENT)
Facility: HOSPITAL | Age: 80
Setting detail: OBSERVATION
Discharge: HOME | End: 2025-04-23
Attending: STUDENT IN AN ORGANIZED HEALTH CARE EDUCATION/TRAINING PROGRAM | Admitting: STUDENT IN AN ORGANIZED HEALTH CARE EDUCATION/TRAINING PROGRAM
Payer: MEDICARE

## 2025-04-22 ENCOUNTER — APPOINTMENT (OUTPATIENT)
Dept: CARDIOLOGY | Facility: HOSPITAL | Age: 80
End: 2025-04-22
Payer: MEDICARE

## 2025-04-22 VITALS
HEART RATE: 70 BPM | SYSTOLIC BLOOD PRESSURE: 126 MMHG | OXYGEN SATURATION: 96 % | DIASTOLIC BLOOD PRESSURE: 74 MMHG | TEMPERATURE: 97.1 F

## 2025-04-22 DIAGNOSIS — R42 DIZZINESS: Primary | ICD-10-CM

## 2025-04-22 DIAGNOSIS — R55 SYNCOPE AND COLLAPSE: ICD-10-CM

## 2025-04-22 DIAGNOSIS — R26.89 IMBALANCE: ICD-10-CM

## 2025-04-22 DIAGNOSIS — R51.9 WORSENING HEADACHES: ICD-10-CM

## 2025-04-22 DIAGNOSIS — R53.1 WEAKNESS: ICD-10-CM

## 2025-04-22 DIAGNOSIS — R06.00 DYSPNEA, UNSPECIFIED: ICD-10-CM

## 2025-04-22 DIAGNOSIS — H81.10 BENIGN PAROXYSMAL POSITIONAL VERTIGO, UNSPECIFIED LATERALITY: ICD-10-CM

## 2025-04-22 DIAGNOSIS — R06.09 DOE (DYSPNEA ON EXERTION): ICD-10-CM

## 2025-04-22 DIAGNOSIS — R29.818 POSITIVE ROMBERG TEST: Primary | ICD-10-CM

## 2025-04-22 DIAGNOSIS — R29.818 POSITIVE ROMBERG TEST: ICD-10-CM

## 2025-04-22 PROBLEM — G45.9 TIA (TRANSIENT ISCHEMIC ATTACK): Status: ACTIVE | Noted: 2025-04-22

## 2025-04-22 LAB
ALBUMIN SERPL BCP-MCNC: 4.1 G/DL (ref 3.4–5)
ALBUMIN SERPL BCP-MCNC: 4.2 G/DL (ref 3.4–5)
ALP SERPL-CCNC: 37 U/L (ref 33–136)
ALP SERPL-CCNC: 42 U/L (ref 33–136)
ALT SERPL W P-5'-P-CCNC: 20 U/L (ref 7–45)
ALT SERPL W P-5'-P-CCNC: 21 U/L (ref 7–45)
ANION GAP SERPL CALC-SCNC: 13 MMOL/L (ref 10–20)
APPEARANCE UR: CLEAR
APTT PPP: 35 SECONDS (ref 26–36)
AST SERPL W P-5'-P-CCNC: 16 U/L (ref 9–39)
AST SERPL W P-5'-P-CCNC: 26 U/L (ref 9–39)
BASOPHILS # BLD AUTO: 0.04 X10*3/UL (ref 0–0.1)
BASOPHILS NFR BLD AUTO: 0.4 %
BILIRUB DIRECT SERPL-MCNC: 0 MG/DL (ref 0–0.3)
BILIRUB SERPL-MCNC: 0.4 MG/DL (ref 0–1.2)
BILIRUB SERPL-MCNC: 0.4 MG/DL (ref 0–1.2)
BILIRUB UR STRIP.AUTO-MCNC: NEGATIVE MG/DL
BNP SERPL-MCNC: 42 PG/ML (ref 0–99)
BUN SERPL-MCNC: 16 MG/DL (ref 6–23)
CALCIUM SERPL-MCNC: 9.4 MG/DL (ref 8.6–10.3)
CARDIAC TROPONIN I PNL SERPL HS: 3 NG/L (ref 0–13)
CARDIAC TROPONIN I PNL SERPL HS: 4 NG/L (ref 0–13)
CHLORIDE SERPL-SCNC: 99 MMOL/L (ref 98–107)
CHOLEST SERPL-MCNC: 140 MG/DL (ref 0–199)
CHOLESTEROL/HDL RATIO: 3
CO2 SERPL-SCNC: 30 MMOL/L (ref 21–32)
COLOR UR: ABNORMAL
CREAT SERPL-MCNC: 0.65 MG/DL (ref 0.5–1.05)
EGFRCR SERPLBLD CKD-EPI 2021: 89 ML/MIN/1.73M*2
EOSINOPHIL # BLD AUTO: 0.21 X10*3/UL (ref 0–0.4)
EOSINOPHIL NFR BLD AUTO: 2.2 %
ERYTHROCYTE [DISTWIDTH] IN BLOOD BY AUTOMATED COUNT: 15.3 % (ref 11.5–14.5)
FLUAV RNA RESP QL NAA+PROBE: NOT DETECTED
FLUBV RNA RESP QL NAA+PROBE: NOT DETECTED
GLUCOSE SERPL-MCNC: 236 MG/DL (ref 74–99)
GLUCOSE UR STRIP.AUTO-MCNC: ABNORMAL MG/DL
HCT VFR BLD AUTO: 40.9 % (ref 36–46)
HDLC SERPL-MCNC: 46.2 MG/DL
HGB BLD-MCNC: 13.2 G/DL (ref 12–16)
IMM GRANULOCYTES # BLD AUTO: 0.04 X10*3/UL (ref 0–0.5)
IMM GRANULOCYTES NFR BLD AUTO: 0.4 % (ref 0–0.9)
INR PPP: 1 (ref 0.9–1.1)
KETONES UR STRIP.AUTO-MCNC: NEGATIVE MG/DL
LACTATE SERPL-SCNC: 1.7 MMOL/L (ref 0.4–2)
LACTATE SERPL-SCNC: 2.1 MMOL/L (ref 0.4–2)
LDLC SERPL CALC-MCNC: 64 MG/DL
LEUKOCYTE ESTERASE UR QL STRIP.AUTO: NEGATIVE
LYMPHOCYTES # BLD AUTO: 2.09 X10*3/UL (ref 0.8–3)
LYMPHOCYTES NFR BLD AUTO: 21.8 %
MCH RBC QN AUTO: 29.8 PG (ref 26–34)
MCHC RBC AUTO-ENTMCNC: 32.3 G/DL (ref 32–36)
MCV RBC AUTO: 92 FL (ref 80–100)
MONOCYTES # BLD AUTO: 0.75 X10*3/UL (ref 0.05–0.8)
MONOCYTES NFR BLD AUTO: 7.8 %
MUCOUS THREADS #/AREA URNS AUTO: NORMAL /LPF
NEUTROPHILS # BLD AUTO: 6.46 X10*3/UL (ref 1.6–5.5)
NEUTROPHILS NFR BLD AUTO: 67.4 %
NITRITE UR QL STRIP.AUTO: NEGATIVE
NON HDL CHOLESTEROL: 94 MG/DL (ref 0–149)
NRBC BLD-RTO: 0 /100 WBCS (ref 0–0)
PH UR STRIP.AUTO: 6 [PH]
PLATELET # BLD AUTO: 310 X10*3/UL (ref 150–450)
POTASSIUM SERPL-SCNC: 4.4 MMOL/L (ref 3.5–5.3)
PROT SERPL-MCNC: 7.1 G/DL (ref 6.4–8.2)
PROT SERPL-MCNC: 7.2 G/DL (ref 6.4–8.2)
PROT UR STRIP.AUTO-MCNC: ABNORMAL MG/DL
PROTHROMBIN TIME: 11.3 SECONDS (ref 9.8–12.4)
RBC # BLD AUTO: 4.43 X10*6/UL (ref 4–5.2)
RBC # UR STRIP.AUTO: NEGATIVE MG/DL
RBC #/AREA URNS AUTO: NORMAL /HPF
SARS-COV-2 RNA RESP QL NAA+PROBE: NOT DETECTED
SODIUM SERPL-SCNC: 138 MMOL/L (ref 136–145)
SP GR UR STRIP.AUTO: 1.02
TRIGL SERPL-MCNC: 149 MG/DL (ref 0–149)
UROBILINOGEN UR STRIP.AUTO-MCNC: NORMAL MG/DL
VLDL: 30 MG/DL (ref 0–40)
WBC # BLD AUTO: 9.6 X10*3/UL (ref 4.4–11.3)
WBC #/AREA URNS AUTO: NORMAL /HPF

## 2025-04-22 PROCEDURE — 70450 CT HEAD/BRAIN W/O DYE: CPT

## 2025-04-22 PROCEDURE — 70551 MRI BRAIN STEM W/O DYE: CPT

## 2025-04-22 PROCEDURE — 99223 1ST HOSP IP/OBS HIGH 75: CPT | Performed by: STUDENT IN AN ORGANIZED HEALTH CARE EDUCATION/TRAINING PROGRAM

## 2025-04-22 PROCEDURE — 2550000001 HC RX 255 CONTRASTS: Mod: JZ | Performed by: STUDENT IN AN ORGANIZED HEALTH CARE EDUCATION/TRAINING PROGRAM

## 2025-04-22 PROCEDURE — 85610 PROTHROMBIN TIME: CPT | Performed by: PHYSICIAN ASSISTANT

## 2025-04-22 PROCEDURE — 80053 COMPREHEN METABOLIC PANEL: CPT | Performed by: PHYSICIAN ASSISTANT

## 2025-04-22 PROCEDURE — 80061 LIPID PANEL: CPT | Performed by: STUDENT IN AN ORGANIZED HEALTH CARE EDUCATION/TRAINING PROGRAM

## 2025-04-22 PROCEDURE — 36415 COLL VENOUS BLD VENIPUNCTURE: CPT | Performed by: PHYSICIAN ASSISTANT

## 2025-04-22 PROCEDURE — 71045 X-RAY EXAM CHEST 1 VIEW: CPT

## 2025-04-22 PROCEDURE — 85730 THROMBOPLASTIN TIME PARTIAL: CPT | Performed by: PHYSICIAN ASSISTANT

## 2025-04-22 PROCEDURE — 83605 ASSAY OF LACTIC ACID: CPT | Performed by: PHYSICIAN ASSISTANT

## 2025-04-22 PROCEDURE — 84484 ASSAY OF TROPONIN QUANT: CPT | Performed by: PHYSICIAN ASSISTANT

## 2025-04-22 PROCEDURE — 2500000001 HC RX 250 WO HCPCS SELF ADMINISTERED DRUGS (ALT 637 FOR MEDICARE OP): Performed by: STUDENT IN AN ORGANIZED HEALTH CARE EDUCATION/TRAINING PROGRAM

## 2025-04-22 PROCEDURE — 82947 ASSAY GLUCOSE BLOOD QUANT: CPT

## 2025-04-22 PROCEDURE — 2500000004 HC RX 250 GENERAL PHARMACY W/ HCPCS (ALT 636 FOR OP/ED): Mod: JZ | Performed by: STUDENT IN AN ORGANIZED HEALTH CARE EDUCATION/TRAINING PROGRAM

## 2025-04-22 PROCEDURE — 71045 X-RAY EXAM CHEST 1 VIEW: CPT | Performed by: RADIOLOGY

## 2025-04-22 PROCEDURE — 96360 HYDRATION IV INFUSION INIT: CPT | Mod: 59

## 2025-04-22 PROCEDURE — 99285 EMERGENCY DEPT VISIT HI MDM: CPT | Mod: 25 | Performed by: STUDENT IN AN ORGANIZED HEALTH CARE EDUCATION/TRAINING PROGRAM

## 2025-04-22 PROCEDURE — 87636 SARSCOV2 & INF A&B AMP PRB: CPT | Performed by: PHYSICIAN ASSISTANT

## 2025-04-22 PROCEDURE — 2500000004 HC RX 250 GENERAL PHARMACY W/ HCPCS (ALT 636 FOR OP/ED): Mod: JZ | Performed by: PHYSICIAN ASSISTANT

## 2025-04-22 PROCEDURE — 83880 ASSAY OF NATRIURETIC PEPTIDE: CPT | Performed by: STUDENT IN AN ORGANIZED HEALTH CARE EDUCATION/TRAINING PROGRAM

## 2025-04-22 PROCEDURE — 81001 URINALYSIS AUTO W/SCOPE: CPT | Performed by: PHYSICIAN ASSISTANT

## 2025-04-22 PROCEDURE — 85025 COMPLETE CBC W/AUTO DIFF WBC: CPT | Performed by: PHYSICIAN ASSISTANT

## 2025-04-22 PROCEDURE — 70498 CT ANGIOGRAPHY NECK: CPT

## 2025-04-22 PROCEDURE — 70551 MRI BRAIN STEM W/O DYE: CPT | Performed by: STUDENT IN AN ORGANIZED HEALTH CARE EDUCATION/TRAINING PROGRAM

## 2025-04-22 PROCEDURE — 84075 ASSAY ALKALINE PHOSPHATASE: CPT | Mod: 59 | Performed by: STUDENT IN AN ORGANIZED HEALTH CARE EDUCATION/TRAINING PROGRAM

## 2025-04-22 PROCEDURE — 70496 CT ANGIOGRAPHY HEAD: CPT | Performed by: STUDENT IN AN ORGANIZED HEALTH CARE EDUCATION/TRAINING PROGRAM

## 2025-04-22 PROCEDURE — G0378 HOSPITAL OBSERVATION PER HR: HCPCS

## 2025-04-22 PROCEDURE — 70498 CT ANGIOGRAPHY NECK: CPT | Performed by: STUDENT IN AN ORGANIZED HEALTH CARE EDUCATION/TRAINING PROGRAM

## 2025-04-22 PROCEDURE — 96372 THER/PROPH/DIAG INJ SC/IM: CPT | Performed by: STUDENT IN AN ORGANIZED HEALTH CARE EDUCATION/TRAINING PROGRAM

## 2025-04-22 PROCEDURE — 93005 ELECTROCARDIOGRAM TRACING: CPT

## 2025-04-22 PROCEDURE — 70450 CT HEAD/BRAIN W/O DYE: CPT | Performed by: RADIOLOGY

## 2025-04-22 RX ORDER — SERTRALINE HYDROCHLORIDE 100 MG/1
100 TABLET, FILM COATED ORAL DAILY
Status: DISCONTINUED | OUTPATIENT
Start: 2025-04-23 | End: 2025-04-23 | Stop reason: HOSPADM

## 2025-04-22 RX ORDER — ASPIRIN 81 MG/1
81 TABLET ORAL DAILY
Status: DISCONTINUED | OUTPATIENT
Start: 2025-04-23 | End: 2025-04-23 | Stop reason: HOSPADM

## 2025-04-22 RX ORDER — ATORVASTATIN CALCIUM 10 MG/1
10 TABLET, FILM COATED ORAL NIGHTLY
Status: DISCONTINUED | OUTPATIENT
Start: 2025-04-22 | End: 2025-04-22 | Stop reason: SDUPTHER

## 2025-04-22 RX ORDER — FORMOTEROL FUMARATE 20 UG/2ML
20 SOLUTION RESPIRATORY (INHALATION)
Status: DISCONTINUED | OUTPATIENT
Start: 2025-04-22 | End: 2025-04-23 | Stop reason: HOSPADM

## 2025-04-22 RX ORDER — INSULIN LISPRO 100 [IU]/ML
0-5 INJECTION, SOLUTION INTRAVENOUS; SUBCUTANEOUS
Status: DISCONTINUED | OUTPATIENT
Start: 2025-04-23 | End: 2025-04-23 | Stop reason: HOSPADM

## 2025-04-22 RX ORDER — AMLODIPINE BESYLATE 10 MG/1
10 TABLET ORAL DAILY
Status: DISCONTINUED | OUTPATIENT
Start: 2025-04-23 | End: 2025-04-23 | Stop reason: HOSPADM

## 2025-04-22 RX ORDER — DEXTROSE 50 % IN WATER (D50W) INTRAVENOUS SYRINGE
12.5
Status: DISCONTINUED | OUTPATIENT
Start: 2025-04-22 | End: 2025-04-23 | Stop reason: HOSPADM

## 2025-04-22 RX ORDER — ATORVASTATIN CALCIUM 40 MG/1
40 TABLET, FILM COATED ORAL NIGHTLY
Status: DISCONTINUED | OUTPATIENT
Start: 2025-04-22 | End: 2025-04-23 | Stop reason: HOSPADM

## 2025-04-22 RX ORDER — CARVEDILOL 6.25 MG/1
6.25 TABLET ORAL
Status: DISCONTINUED | OUTPATIENT
Start: 2025-04-23 | End: 2025-04-23 | Stop reason: HOSPADM

## 2025-04-22 RX ORDER — ISOSORBIDE MONONITRATE 60 MG/1
60 TABLET, EXTENDED RELEASE ORAL DAILY
Status: DISCONTINUED | OUTPATIENT
Start: 2025-04-23 | End: 2025-04-23 | Stop reason: HOSPADM

## 2025-04-22 RX ORDER — ENOXAPARIN SODIUM 100 MG/ML
40 INJECTION SUBCUTANEOUS EVERY 24 HOURS
Status: DISCONTINUED | OUTPATIENT
Start: 2025-04-22 | End: 2025-04-23 | Stop reason: HOSPADM

## 2025-04-22 RX ORDER — NAPROXEN SODIUM 220 MG/1
81 TABLET, FILM COATED ORAL DAILY
Status: DISCONTINUED | OUTPATIENT
Start: 2025-04-22 | End: 2025-04-22 | Stop reason: SDUPTHER

## 2025-04-22 RX ORDER — DEXTROSE 50 % IN WATER (D50W) INTRAVENOUS SYRINGE
25
Status: DISCONTINUED | OUTPATIENT
Start: 2025-04-22 | End: 2025-04-23 | Stop reason: HOSPADM

## 2025-04-22 RX ORDER — ALBUTEROL SULFATE 90 UG/1
2 INHALANT RESPIRATORY (INHALATION) EVERY 4 HOURS PRN
Status: DISCONTINUED | OUTPATIENT
Start: 2025-04-22 | End: 2025-04-23 | Stop reason: HOSPADM

## 2025-04-22 RX ADMIN — ATORVASTATIN CALCIUM 40 MG: 40 TABLET, FILM COATED ORAL at 23:26

## 2025-04-22 RX ADMIN — IOHEXOL 75 ML: 350 INJECTION, SOLUTION INTRAVENOUS at 21:31

## 2025-04-22 RX ADMIN — SODIUM CHLORIDE 500 ML: 0.9 INJECTION, SOLUTION INTRAVENOUS at 18:39

## 2025-04-22 RX ADMIN — ENOXAPARIN SODIUM 40 MG: 40 INJECTION SUBCUTANEOUS at 23:26

## 2025-04-22 SDOH — ECONOMIC STABILITY: INCOME INSECURITY: IN THE PAST 12 MONTHS HAS THE ELECTRIC, GAS, OIL, OR WATER COMPANY THREATENED TO SHUT OFF SERVICES IN YOUR HOME?: NO

## 2025-04-22 SDOH — SOCIAL STABILITY: SOCIAL INSECURITY: WITHIN THE LAST YEAR, HAVE YOU BEEN HUMILIATED OR EMOTIONALLY ABUSED IN OTHER WAYS BY YOUR PARTNER OR EX-PARTNER?: NO

## 2025-04-22 SDOH — SOCIAL STABILITY: SOCIAL INSECURITY: HAVE YOU HAD THOUGHTS OF HARMING ANYONE ELSE?: NO

## 2025-04-22 SDOH — ECONOMIC STABILITY: FOOD INSECURITY: WITHIN THE PAST 12 MONTHS, YOU WORRIED THAT YOUR FOOD WOULD RUN OUT BEFORE YOU GOT THE MONEY TO BUY MORE.: NEVER TRUE

## 2025-04-22 SDOH — SOCIAL STABILITY: SOCIAL INSECURITY: WITHIN THE LAST YEAR, HAVE YOU BEEN AFRAID OF YOUR PARTNER OR EX-PARTNER?: NO

## 2025-04-22 SDOH — ECONOMIC STABILITY: FOOD INSECURITY: WITHIN THE PAST 12 MONTHS, THE FOOD YOU BOUGHT JUST DIDN'T LAST AND YOU DIDN'T HAVE MONEY TO GET MORE.: NEVER TRUE

## 2025-04-22 SDOH — SOCIAL STABILITY: SOCIAL INSECURITY: WERE YOU ABLE TO COMPLETE ALL THE BEHAVIORAL HEALTH SCREENINGS?: YES

## 2025-04-22 ASSESSMENT — ENCOUNTER SYMPTOMS
FLANK PAIN: 0
WOUND: 0
HEADACHES: 1
ABDOMINAL PAIN: 0
NAUSEA: 0
COUGH: 0
COLOR CHANGE: 0
ABDOMINAL PAIN: 0
CONFUSION: 0
SEIZURES: 0
FREQUENCY: 0
APPETITE CHANGE: 0
CHILLS: 0
DIZZINESS: 0
SHORTNESS OF BREATH: 0
SINUS PAIN: 0
MYALGIAS: 0
WHEEZING: 0
APNEA: 0
VOMITING: 0
ABDOMINAL DISTENTION: 0
FATIGUE: 0
CHEST TIGHTNESS: 0
DIFFICULTY URINATING: 0
WEAKNESS: 0
SLEEP DISTURBANCE: 0
NUMBNESS: 0
DIARRHEA: 0
FEVER: 0
SPEECH DIFFICULTY: 0
ARTHRALGIAS: 0
DIAPHORESIS: 0
ACTIVITY CHANGE: 0
DECREASED CONCENTRATION: 0
APPETITE CHANGE: 0
FACIAL ASYMMETRY: 1
FEVER: 0
SHORTNESS OF BREATH: 0
PALPITATIONS: 0
DIZZINESS: 1
ACTIVITY CHANGE: 0
CHILLS: 0
AGITATION: 0
STRIDOR: 0
HEMATURIA: 0
BACK PAIN: 0
RHINORRHEA: 0
LIGHT-HEADEDNESS: 0
DYSURIA: 0
LIGHT-HEADEDNESS: 0
JOINT SWELLING: 0
CONSTIPATION: 0
SORE THROAT: 0
NERVOUS/ANXIOUS: 0

## 2025-04-22 ASSESSMENT — COGNITIVE AND FUNCTIONAL STATUS - GENERAL
MOBILITY SCORE: 24
DAILY ACTIVITIY SCORE: 24
PATIENT BASELINE BEDBOUND: NO

## 2025-04-22 ASSESSMENT — ACTIVITIES OF DAILY LIVING (ADL)
JUDGMENT_ADEQUATE_SAFELY_COMPLETE_DAILY_ACTIVITIES: YES
LACK_OF_TRANSPORTATION: NO
HEARING - LEFT EAR: FUNCTIONAL
ASSISTIVE_DEVICE: CANE;WALKER;EYEGLASSES
ADEQUATE_TO_COMPLETE_ADL: YES
BATHING: INDEPENDENT
HEARING - RIGHT EAR: FUNCTIONAL
DRESSING YOURSELF: INDEPENDENT
WALKS IN HOME: INDEPENDENT
TOILETING: INDEPENDENT
GROOMING: INDEPENDENT
PATIENT'S MEMORY ADEQUATE TO SAFELY COMPLETE DAILY ACTIVITIES?: YES
LACK_OF_TRANSPORTATION: NO
FEEDING YOURSELF: INDEPENDENT

## 2025-04-22 ASSESSMENT — LIFESTYLE VARIABLES
AUDIT-C TOTAL SCORE: 0
SKIP TO QUESTIONS 9-10: 1
HOW OFTEN DO YOU HAVE A DRINK CONTAINING ALCOHOL: NEVER
EVER FELT BAD OR GUILTY ABOUT YOUR DRINKING: NO
SUBSTANCE_ABUSE_PAST_12_MONTHS: NO
HOW OFTEN DO YOU HAVE 6 OR MORE DRINKS ON ONE OCCASION: NEVER
AUDIT-C TOTAL SCORE: 0
HAVE PEOPLE ANNOYED YOU BY CRITICIZING YOUR DRINKING: NO
EVER HAD A DRINK FIRST THING IN THE MORNING TO STEADY YOUR NERVES TO GET RID OF A HANGOVER: NO
PRESCIPTION_ABUSE_PAST_12_MONTHS: NO
TOTAL SCORE: 0
HAVE YOU EVER FELT YOU SHOULD CUT DOWN ON YOUR DRINKING: NO
HOW MANY STANDARD DRINKS CONTAINING ALCOHOL DO YOU HAVE ON A TYPICAL DAY: PATIENT DOES NOT DRINK

## 2025-04-22 ASSESSMENT — PAIN - FUNCTIONAL ASSESSMENT: PAIN_FUNCTIONAL_ASSESSMENT: 0-10

## 2025-04-22 ASSESSMENT — PAIN SCALES - GENERAL
PAINLEVEL_OUTOF10: 0 - NO PAIN
PAINLEVEL_OUTOF10: 0 - NO PAIN

## 2025-04-22 ASSESSMENT — COLUMBIA-SUICIDE SEVERITY RATING SCALE - C-SSRS
6. HAVE YOU EVER DONE ANYTHING, STARTED TO DO ANYTHING, OR PREPARED TO DO ANYTHING TO END YOUR LIFE?: NO
2. HAVE YOU ACTUALLY HAD ANY THOUGHTS OF KILLING YOURSELF?: NO
1. IN THE PAST MONTH, HAVE YOU WISHED YOU WERE DEAD OR WISHED YOU COULD GO TO SLEEP AND NOT WAKE UP?: NO

## 2025-04-22 NOTE — PROGRESS NOTES
Subjective   Patient ID: Funmilayo Montenegro is a 80 y.o. female who presents for Diabetes and Dizziness (Recheck x 1 month).    HPI   Pt has been havign worsened vision, worse on the right side of her eye. Intermittently would lose vision in that eye. Imabalance also worsening. Feels like she is falling on the left side. She can no longer walk without a 4 wheeled walker, which is new for her. Also having more weakness on the right side of her arm. No further syncope, no slurred speech. Accompanied by son today. Also admitted to having headaches and the sense of imabalance and vision change is worse when she has the ehadaches that are also worsening     Review of Systems   Constitutional:  Negative for activity change, appetite change, chills and fever.   Eyes:  Negative for visual disturbance.   Respiratory:  Negative for shortness of breath.    Cardiovascular:  Negative for chest pain.   Gastrointestinal:  Negative for abdominal pain.   Skin:  Negative for rash.   Neurological:  Positive for weakness, numbness and headaches. Negative for dizziness, tremors, speech difficulty and light-headedness.   Psychiatric/Behavioral:  Negative for sleep disturbance.        Objective   /74   Pulse 70   Temp 36.2 °C (97.1 °F)   SpO2 96%     Physical Exam  Constitutional:       Appearance: Normal appearance.   Eyes:      General: Visual field deficit (right side) present.      Pupils: Pupils are equal, round, and reactive to light.   Cardiovascular:      Rate and Rhythm: Normal rate and regular rhythm.   Pulmonary:      Effort: Pulmonary effort is normal.      Breath sounds: Normal breath sounds.   Abdominal:      General: Abdomen is flat. Bowel sounds are normal.      Palpations: Abdomen is soft.   Musculoskeletal:         General: Normal range of motion.   Skin:     General: Skin is warm.   Neurological:      Mental Status: She is alert and oriented to person, place, and time.      Cranial Nerves: No dysarthria.       Sensory: Sensation is intact.      Motor: Weakness (4/5 LE symmetric bilaterally) present. No atrophy or abnormal muscle tone.      Coordination: Romberg sign positive. Coordination abnormal (falling on the left side). Finger-Nose-Finger Test abnormal. Heel to Lauren Test normal.      Gait: Gait abnormal.   Psychiatric:         Mood and Affect: Mood normal.         Assessment/Plan   Assessment & Plan  Positive Romberg test         Weakness         Imbalance  Due for cocern of acute stroke, sent pt to ED. Pt has worseneing headache, acute vision change, and worsening imabalance and couldn't even ambulate without falling over        Worsening headaches

## 2025-04-22 NOTE — ED PROVIDER NOTES
EMERGENCY MEDICINE EVALUATION NOTE    History of Present Illness     Chief Complaint:   Chief Complaint   Patient presents with    Dizziness     Sent to ER by pcp for stroke sx.   At the office today the doctor had her close her eye with palms up and pt leaned l.   Family states this has had these sx for weeks.  She now lives with her daughter       HPI: Funmilayo Montenegro is a 80 y.o. female presents with a chief complaint of possible strokelike symptoms.  Patient was at the primary care provider's office today who sent her over here due to abnormal exam findings.  Patient provide some history but history of family obtained from son.  Patient has been having some issues with vision over the last several weeks as well as some issues with some balance.  Son reports that he is also noted she has been a little confused and dizzy on occasion.  He states that she maybe has a little bit of difficulty with speech but nothing significant for he thought she was having an acute stroke at the time.  Once again the symptoms involve going on for weeks.  Patient was taken to her primary care provider today for follow-up.  During visit patient apparently had difficulty with balance testing which sounded like they were providing a Romberg test.  Patient denies any current complaints at this time.  Patient does live at home with daughter.    Previous History   Medical History[1]  Surgical History[2]  Social History[3]  Family History[4]  Allergies[5]  Current Outpatient Medications   Medication Instructions    albuterol (Ventolin HFA) 90 mcg/actuation inhaler 2 puffs, inhalation, Every 4 hours PRN    alendronate (FOSAMAX) 70 mg, oral, Every 7 days, Take in the morning with a full glass of water, on an empty stomach, and do not take anything else by mouth or lie down for the next 30 min.    amLODIPine (NORVASC) 10 mg, oral, Daily    aspirin 81 mg, Daily    atorvastatin (LIPITOR) 40 mg, oral, Daily    blood sugar diagnostic  "(Accu-Chek Meghan Plus test strp) strip Test once daily as directed.    carvedilol (COREG) 6.25 mg, oral, 2 times daily (morning and late afternoon)    glimepiride (AMARYL) 4 mg, oral, 2 times daily    isosorbide mononitrate ER (IMDUR) 60 mg, oral, Daily    loratadine (CLARITIN) 10 mg, oral, Daily    metFORMIN (GLUCOPHAGE) 1,000 mg, oral, 2 times daily (morning and late afternoon)    nicotine (Nicoderm CQ) 14 mg/24 hr patch 1 patch, transdermal, Daily    pen needle, diabetic (NovoTwist) 32 gauge x 1/5\" needle Use daily as directed with Humalog per sliding scale    pioglitazone (ACTOS) 15 mg, oral, Daily    polyethylene glycol (GLYCOLAX, MIRALAX) 17 g, oral, Daily    sertraline (ZOLOFT) 100 mg, oral, Daily    tiotropium-olodateroL (Stiolto Respimat) 2.5-2.5 mcg/actuation mist inhaler 2 Inhalations, inhalation, Daily    Trulicity 0.75 mg, subcutaneous, Once Weekly    Ventolin HFA 90 mcg/actuation inhaler 2 puffs, Every 4 hours PRN       Physical Exam     Appearance: Alert, oriented , cooperative     Skin: Intact,  dry skin, no lesions, rash, petechiae or purpura.      Eyes: PERRLA, EOMs intact,  Conjunctiva pink      ENT: Hearing grossly intact. Pharynx clear, uvula midline.      Neck: Supple. Trachea at midline.      Pulmonary: Clear bilaterally. No rales, rhonchi or wheezing. No accessory muscle use or stridor.     Cardiac: Normal rate and rhythm without murmur     Abdomen: Soft, nontender, active bowel sounds.     Musculoskeletal: Full range of motion.      Neurological:Cranial nerves II through XII are grossly intact, normal sensation, no weakness, no focal findings identified.  Romberg test is positive as patient becomes unbalanced with eyes closed and palms upward.     Results     Labs Reviewed   CBC WITH AUTO DIFFERENTIAL - Abnormal       Result Value    WBC 9.6      nRBC 0.0      RBC 4.43      Hemoglobin 13.2      Hematocrit 40.9      MCV 92      MCH 29.8      MCHC 32.3      RDW 15.3 (*)     Platelets 310      " Neutrophils % 67.4      Immature Granulocytes %, Automated 0.4      Lymphocytes % 21.8      Monocytes % 7.8      Eosinophils % 2.2      Basophils % 0.4      Neutrophils Absolute 6.46 (*)     Immature Granulocytes Absolute, Automated 0.04      Lymphocytes Absolute 2.09      Monocytes Absolute 0.75      Eosinophils Absolute 0.21      Basophils Absolute 0.04     COMPREHENSIVE METABOLIC PANEL - Abnormal    Glucose 236 (*)     Sodium 138      Potassium 4.4      Chloride 99      Bicarbonate 30      Anion Gap 13      Urea Nitrogen 16      Creatinine 0.65      eGFR 89      Calcium 9.4      Albumin 4.2      Alkaline Phosphatase 42      Total Protein 7.1      AST 16      Bilirubin, Total 0.4      ALT 21     LACTATE - Abnormal    Lactate 2.1 (*)     Narrative:     Venipuncture immediately after or during the administration of Metamizole may lead to falsely low results. Testing should be performed immediately prior to Metamizole dosing.   URINALYSIS WITH REFLEX CULTURE AND MICROSCOPIC - Abnormal    Color, Urine Light-Yellow      Appearance, Urine Clear      Specific Gravity, Urine 1.016      pH, Urine 6.0      Protein, Urine 30 (1+) (*)     Glucose, Urine 100 (1+) (*)     Blood, Urine NEGATIVE      Ketones, Urine NEGATIVE      Bilirubin, Urine NEGATIVE      Urobilinogen, Urine Normal      Nitrite, Urine NEGATIVE      Leukocyte Esterase, Urine NEGATIVE     APTT - Normal    aPTT 35      Narrative:     The APTT is no longer used for monitoring Unfractionated Heparin Therapy. For monitoring Heparin Therapy, use the Heparin Assay.   PROTIME-INR - Normal    Protime 11.3      INR 1.0     SARS-COV-2 PCR - Normal    Coronavirus 2019, PCR Not Detected      Narrative:     This assay is an FDA-cleared, in vitro diagnostic nucleic acid amplification test for the qualitative detection and differentiation of SARS CoV-2 from nasopharyngeal specimens collected from individuals with signs and symptoms of respiratory tract infections, and has  been validated for use at [facility]. Negative results do not preclude COVID-19 infections and should not be used as the sole basis for diagnosis, treatment, or other management decisions. Testing for SARS CoV-2 is recommended only for patients who meet current clinical and/or epidemiological criteria defined by federal, state, or local public health directives.   INFLUENZA A AND B PCR - Normal    Flu A Result Not Detected      Flu B Result Not Detected      Narrative:     This assay is an in vitro diagnostic multiplex nucleic acid amplification test for the detection and discrimination of Influenza A & B from nasopharyngeal specimens, and has been validated for use at [facility]. Negative results do not preclude Influenza A/B infections, and should not be used as the sole basis for diagnosis, treatment, or other management decisions. If Influenza A/B and RSV PCR results are negative, testing for Parainfluenza virus, Adenovirus and Metapneumovirus is routinely performed for [facility] pediatric oncology and intensive care inpatients, and is available on other patients by placing an add-on request.   SERIAL TROPONIN-INITIAL - Normal    Troponin I, High Sensitivity 4      Narrative:     Less than 99th percentile of normal range cutoff-  Female and children under 18 years old <14 ng/L; Male <21 ng/L: Negative  Repeat testing should be performed if clinically indicated.     Female and children under 18 years old 14-50 ng/L; Male 21-50 ng/L:  Consistent with possible cardiac damage and possible increased clinical   risk. Serial measurements may help to assess extent of myocardial damage.     >50 ng/L: Consistent with cardiac damage, increased clinical risk and  myocardial infarction. Serial measurements may help assess extent of   myocardial damage.      NOTE: Children less than 1 year old may have higher baseline troponin   levels and results should be interpreted in conjunction  with the overall   clinical context.     NOTE: Troponin I testing is performed using a different   testing methodology at [facility] than at other   [facility]. Direct result comparisons should only   be made within the same method.   SERIAL TROPONIN, 1 HOUR - Normal    Troponin I, High Sensitivity 3      Narrative:     Less than 99th percentile of normal range cutoff-  Female and children under 18 years old <14 ng/L; Male <21 ng/L: Negative  Repeat testing should be performed if clinically indicated.     Female and children under 18 years old 14-50 ng/L; Male 21-50 ng/L:  Consistent with possible cardiac damage and possible increased clinical   risk. Serial measurements may help to assess extent of myocardial damage.     >50 ng/L: Consistent with cardiac damage, increased clinical risk and  myocardial infarction. Serial measurements may help assess extent of   myocardial damage.      NOTE: Children less than 1 year old may have higher baseline troponin   levels and results should be interpreted in conjunction with the overall   clinical context.     NOTE: Troponin I testing is performed using a different   testing methodology at [facility] than at other   [facility]. Direct result comparisons should only   be made within the same method.   LACTATE - Normal    Lactate 1.7      Narrative:     Venipuncture immediately after or during the administration of Metamizole may lead to falsely low results. Testing should be performed immediately prior to Metamizole dosing.   TROPONIN SERIES- (INITIAL, 1 HR)    Narrative:     The following orders were created for panel order Troponin I Series, High Sensitivity (0, 1 HR).  Procedure                               Abnormality         Status                     ---------                               -----------         ------                     Troponin I, High Sensiti...[[id number]]  Normal              Final result               Troponin,  "High Sensitivi...[[id number]]  Normal              Final result                 Please view results for these tests on the individual orders.   URINALYSIS WITH REFLEX CULTURE AND MICROSCOPIC    Narrative:     The following orders were created for panel order Urinalysis with Reflex Culture and Microscopic.  Procedure                               Abnormality         Status                     ---------                               -----------         ------                     Urinalysis with Reflex C...[[id number]]  Abnormal            Final result               Extra Urine Gray Tube[[id number]]                            In process                   Please view results for these tests on the individual orders.   EXTRA URINE GRAY TUBE   URINALYSIS MICROSCOPIC WITH REFLEX CULTURE    WBC, Urine 1-5      RBC, Urine 1-2      Mucus, Urine FEW       CT head wo IV contrast   Final Result   No evidence of acute cortical infarct or intracranial hemorrhage.        Old-appearing lacunar type ischemia of the right thalamus. MRI may be   obtained for more sensitive assessment of underlying acute ischemia.        MACRO:   None             Signed by: Ted Givens 4/22/2025 6:31 PM   Dictation workstation:   [id number]      XR chest 1 view   Final Result   No evidence of acute intrathoracic abnormality.        Signed by: Luis Oliveira 4/22/2025 5:16 PM   Dictation workstation:   [id number]            ED Course & Medical Decision Making     Medications   sodium chloride 0.9 % bolus 500 mL (500 mL intravenous New Bag 4/22/25 1839)     Heart Rate:  [70-79]   Temperature:  [36.2 °C (97.1 °F)-36.6 °C (97.9 °F)]   Respirations:  [16]   BP: (126-168)/(74-85)   Height:  [142.2 cm (4' 8\")]   Weight:  [53.1 kg (117 lb)]   Pulse Ox:  [96 %]    ED Course as of 04/22/25 2054 Tue Apr 22, 2025 1654 EKG performed 4/22/2025 at 4:49 PM.  Sinus rhythm with a ventricular rate of 69 bpm, TN interval 153 ms, QT/QTc of 380/407 ms.  No STEMI.  " Interpreted by attending physician. [CJ]   1840 Hospitalist paged for admission  [CJ]    Spoke to the hospitalist who agreed to admit the patient for observation.  [CJ]      ED Course User Index  [CJ] Avery Mack PA-C         Diagnoses as of 25   Dizziness   Positive Romberg test       Procedures   Procedures    Diagnosis     1. Dizziness    2. Positive Romberg test        Disposition   Admit for obs    ED Prescriptions    None         Disclaimer: This note was dictated by speech recognition. Minor errors in transcription may be present. Please call if questions.         [1] No past medical history on file.  [2] No past surgical history on file.  [3]   Social History  Tobacco Use    Smoking status: Former     Current packs/day: 0.00     Average packs/day: 0.8 packs/day for 60.0 years (45.0 ttl pk-yrs)     Types: Cigarettes     Start date:      Quit date:      Years since quittin.3    Smokeless tobacco: Never   Vaping Use    Vaping status: Never Used   Substance Use Topics    Alcohol use: Not Currently    Drug use: Not Currently   [4] No family history on file.  [5]   Allergies  Allergen Reactions    Omeprazole Hives     hives    Ace Inhibitors Hives    Bupropion Hives    Bupropion Hcl Hives     hives    Codeine Other    Penicillins Other    Sulfamethoxazole-Trimethoprim Hives        Avery Mack PA-C  25

## 2025-04-23 ENCOUNTER — APPOINTMENT (OUTPATIENT)
Dept: CARDIOLOGY | Facility: HOSPITAL | Age: 80
End: 2025-04-23
Payer: MEDICARE

## 2025-04-23 ENCOUNTER — PHARMACY VISIT (OUTPATIENT)
Dept: PHARMACY | Facility: CLINIC | Age: 80
End: 2025-04-23
Payer: COMMERCIAL

## 2025-04-23 VITALS
HEART RATE: 69 BPM | BODY MASS INDEX: 26.32 KG/M2 | TEMPERATURE: 97.9 F | DIASTOLIC BLOOD PRESSURE: 76 MMHG | SYSTOLIC BLOOD PRESSURE: 174 MMHG | WEIGHT: 117 LBS | OXYGEN SATURATION: 91 % | HEIGHT: 56 IN | RESPIRATION RATE: 18 BRPM

## 2025-04-23 PROBLEM — G45.9 TIA (TRANSIENT ISCHEMIC ATTACK): Status: RESOLVED | Noted: 2025-04-22 | Resolved: 2025-04-23

## 2025-04-23 LAB
ATRIAL RATE: 69 BPM
EJECTION FRACTION APICAL 4 CHAMBER: 70.8
EJECTION FRACTION: 69 %
EST. AVERAGE GLUCOSE BLD GHB EST-MCNC: 148 MG/DL
GLUCOSE BLD MANUAL STRIP-MCNC: 122 MG/DL (ref 74–99)
GLUCOSE BLD MANUAL STRIP-MCNC: 179 MG/DL (ref 74–99)
GLUCOSE BLD MANUAL STRIP-MCNC: 183 MG/DL (ref 74–99)
HBA1C MFR BLD: 6.8 % (ref ?–5.7)
HOLD SPECIMEN: NORMAL
LEFT ATRIUM VOLUME AREA LENGTH INDEX BSA: 16.5 ML/M2
LEFT VENTRICLE INTERNAL DIMENSION DIASTOLE: 4.4 CM (ref 3.5–6)
LEFT VENTRICULAR OUTFLOW TRACT DIAMETER: 2.1 CM
LV EJECTION FRACTION BIPLANE: 69 %
MITRAL VALVE E/A RATIO: 0.54
P AXIS: 20 DEGREES
PR INTERVAL: 153 MS
Q ONSET: 251 MS
QRS COUNT: 11 BEATS
QRS DURATION: 95 MS
QT INTERVAL: 380 MS
QTC CALCULATION(BAZETT): 407 MS
QTC FREDERICIA: 398 MS
R AXIS: 17 DEGREES
RIGHT VENTRICLE FREE WALL PEAK S': 11.2 CM/S
RIGHT VENTRICLE PEAK SYSTOLIC PRESSURE: 41.7 MMHG
T AXIS: 62 DEGREES
T OFFSET: 441 MS
TRICUSPID ANNULAR PLANE SYSTOLIC EXCURSION: 1.5 CM
VENTRICULAR RATE: 69 BPM

## 2025-04-23 PROCEDURE — 2500000004 HC RX 250 GENERAL PHARMACY W/ HCPCS (ALT 636 FOR OP/ED): Mod: JW | Performed by: STUDENT IN AN ORGANIZED HEALTH CARE EDUCATION/TRAINING PROGRAM

## 2025-04-23 PROCEDURE — G0378 HOSPITAL OBSERVATION PER HR: HCPCS

## 2025-04-23 PROCEDURE — 2500000004 HC RX 250 GENERAL PHARMACY W/ HCPCS (ALT 636 FOR OP/ED): Performed by: INTERNAL MEDICINE

## 2025-04-23 PROCEDURE — 36415 COLL VENOUS BLD VENIPUNCTURE: CPT | Performed by: STUDENT IN AN ORGANIZED HEALTH CARE EDUCATION/TRAINING PROGRAM

## 2025-04-23 PROCEDURE — 94640 AIRWAY INHALATION TREATMENT: CPT

## 2025-04-23 PROCEDURE — 97161 PT EVAL LOW COMPLEX 20 MIN: CPT | Mod: GP | Performed by: PHYSICAL THERAPIST

## 2025-04-23 PROCEDURE — RXMED WILLOW AMBULATORY MEDICATION CHARGE

## 2025-04-23 PROCEDURE — 2500000002 HC RX 250 W HCPCS SELF ADMINISTERED DRUGS (ALT 637 FOR MEDICARE OP, ALT 636 FOR OP/ED): Performed by: STUDENT IN AN ORGANIZED HEALTH CARE EDUCATION/TRAINING PROGRAM

## 2025-04-23 PROCEDURE — 83036 HEMOGLOBIN GLYCOSYLATED A1C: CPT | Mod: PORLAB | Performed by: STUDENT IN AN ORGANIZED HEALTH CARE EDUCATION/TRAINING PROGRAM

## 2025-04-23 PROCEDURE — 93306 TTE W/DOPPLER COMPLETE: CPT | Performed by: INTERNAL MEDICINE

## 2025-04-23 PROCEDURE — 2500000001 HC RX 250 WO HCPCS SELF ADMINISTERED DRUGS (ALT 637 FOR MEDICARE OP): Performed by: STUDENT IN AN ORGANIZED HEALTH CARE EDUCATION/TRAINING PROGRAM

## 2025-04-23 PROCEDURE — C8929 TTE W OR WO FOL WCON,DOPPLER: HCPCS

## 2025-04-23 PROCEDURE — 99223 1ST HOSP IP/OBS HIGH 75: CPT | Performed by: NURSE PRACTITIONER

## 2025-04-23 PROCEDURE — 99239 HOSP IP/OBS DSCHRG MGMT >30: CPT | Performed by: INTERNAL MEDICINE

## 2025-04-23 PROCEDURE — 82947 ASSAY GLUCOSE BLOOD QUANT: CPT

## 2025-04-23 RX ORDER — MECLIZINE HYDROCHLORIDE 25 MG/1
25 TABLET ORAL 3 TIMES DAILY PRN
Qty: 30 TABLET | Refills: 0 | Status: SHIPPED | OUTPATIENT
Start: 2025-04-23

## 2025-04-23 RX ORDER — GUAIFENESIN 600 MG/1
600 TABLET, EXTENDED RELEASE ORAL 2 TIMES DAILY PRN
Status: DISCONTINUED | OUTPATIENT
Start: 2025-04-23 | End: 2025-04-23 | Stop reason: HOSPADM

## 2025-04-23 RX ORDER — MECLIZINE HYDROCHLORIDE 25 MG/1
25 TABLET ORAL 3 TIMES DAILY PRN
Status: DISCONTINUED | OUTPATIENT
Start: 2025-04-23 | End: 2025-04-23 | Stop reason: HOSPADM

## 2025-04-23 RX ADMIN — ASPIRIN 81 MG: 81 TABLET, COATED ORAL at 09:00

## 2025-04-23 RX ADMIN — SERTRALINE 100 MG: 100 TABLET, FILM COATED ORAL at 09:00

## 2025-04-23 RX ADMIN — HUMAN ALBUMIN MICROSPHERES AND PERFLUTREN 0.5 ML: 10; .22 INJECTION, SOLUTION INTRAVENOUS at 13:23

## 2025-04-23 RX ADMIN — INSULIN LISPRO 1 UNITS: 100 INJECTION, SOLUTION INTRAVENOUS; SUBCUTANEOUS at 12:02

## 2025-04-23 RX ADMIN — TIOTROPIUM BROMIDE INHALATION SPRAY 2 PUFF: 3.12 SPRAY, METERED RESPIRATORY (INHALATION) at 09:05

## 2025-04-23 RX ADMIN — FORMOTEROL FUMARATE DIHYDRATE 20 MCG: 20 SOLUTION RESPIRATORY (INHALATION) at 07:47

## 2025-04-23 RX ADMIN — GUAIFENESIN 600 MG: 600 TABLET ORAL at 05:56

## 2025-04-23 ASSESSMENT — PAIN SCALES - GENERAL: PAINLEVEL_OUTOF10: 0 - NO PAIN

## 2025-04-23 ASSESSMENT — COGNITIVE AND FUNCTIONAL STATUS - GENERAL
WALKING IN HOSPITAL ROOM: A LITTLE
CLIMB 3 TO 5 STEPS WITH RAILING: A LOT
WALKING IN HOSPITAL ROOM: A LITTLE
MOBILITY SCORE: 21
DAILY ACTIVITIY SCORE: 24
MOBILITY SCORE: 22
CLIMB 3 TO 5 STEPS WITH RAILING: A LITTLE

## 2025-04-23 ASSESSMENT — VISUAL ACUITY: VA_NORMAL: 1

## 2025-04-23 NOTE — CARE PLAN
The patient's goals for the shift include  fall free     The clinical goals for the shift include patient to remain injury and fall free

## 2025-04-23 NOTE — PROGRESS NOTES
Occupational Therapy                 Therapy Communication Note    Patient Name: Funmilayo Montenegro  MRN: [id number]  Department: [facility]  Room: [address] [place], [place] [address]  Today's Date: 4/23/2025     Discipline: Occupational Therapy          Missed Visit Reason: Other (Comment) (Pt. SBA for ADLs and amb. with FWW. No O.T. needs. Discharge O.T.)

## 2025-04-23 NOTE — NURSING NOTE
Discharging instructions discussed with patient and daughter. Patient and daughter also understands follow-up protocol.  Pharmacy brought up Meds to Beds, patient and daughter understand medication prescribed.  IVs removed.  All belongings returned and patient ready to leave. Patient safely discharged in wheelchair.

## 2025-04-23 NOTE — PROGRESS NOTES
Physical Therapy    Physical Therapy Evaluation    Patient Name: Funmilayo Montenegro  MRN: [id number]  Today's Date: 4/23/2025   Time Calculation  Start Time: 1111  Stop Time: 1125  Time Calculation (min): 14 min  2314/2314-A    Assessment/Plan   PT Assessment  Barriers to Discharge Home: No anticipated barriers  Evaluation/Treatment Tolerance: Patient tolerated treatment well  Medical Staff Made Aware: Yes  End of Session Communication: Bedside nurse  Assessment Comment: Patient requires only SBA x1 for mobility, can continue to increase OOB activity with mobility aide while admitted. Patient appears at baseline, no needs for acute therapy, will DC from PT.  End of Session Patient Position: Bed, 3 rail up, Alarm on  IP OR SWING BED PT PLAN  Inpatient or Swing Bed: Inpatient  PT Plan  PT Plan: PT Eval only  PT Eval Only Reason: At baseline function  PT Frequency: PT eval only  PT Discharge Recommendations: No further acute PT; Recommend Out Patient PT for follow-up of dizziness if persists.   PT - OK to Discharge: Yes (when medically cleared)      General Visit Information:  General  Reason for Referral: Dx: Dizziness, ? TIA  Referred By: Oliver  Past Medical History Relevant to Rehab: DM, HTN, COPD, osteoporosis  Prior to Session Communication: PCT/NA/CTA  Patient Position Received: Bed, 3 rail up, Alarm on  General Comment: Seen in room 2314, family present    Home Living:  Home Living  Type of Home:  (Lives with grandson but has been staying with daughter recently. Patient amb with FWW, fairly independent with ADLs. Patient reports dizziness off and on for last couple years since she had a fall, no activity makes symptoms worse/better.)    Prior Level of Function:       Precautions:  Precautions  Precautions Comment: falls    Vital Signs:     Objective     Pain:  Pain Assessment  0-10 (Numeric) Pain Score: 0 - No pain    Cognition:  Cognition  Overall Cognitive Status: Within Functional Limits    General  Assessments:      Activity Tolerance  Endurance: Endurance does not limit participation in activity     Strength  Strength Comments: WFL        Postural Control  Postural Control:  (mild flexed posture in standing, no LOB with activity)          Functional Assessments:     Bed Mobility  Bed Mobility:  (Supine to/from sit with SBA x1; cues for safety, balance, posture, wt shifting)  Transfers  Transfer:  (Sit to stand with SBA x1 and FWW; cues for safety, balance, posture, wt shifting)  Ambulation/Gait Training  Ambulation/Gait Training Performed:  (Amb 200 feet with SBA x1 and FWW; cues for safety, balance, posture, wt shifting, AD use - veers slightly to R occasionally, tends to keep Ad too far forward at times but able to correct when cued.)          Extremity/Trunk Assessments:                Outcome Measures:     [facility] Basic Mobility  Turning from your back to your side while in a flat bed without using bedrails: None  Moving from lying on your back to sitting on the side of a flat bed without using bedrails: None  Moving to and from bed to chair (including a wheelchair): None  Standing up from a chair using your arms (e.g. wheelchair or bedside chair): None  To walk in hospital room: A little (cues prn)  Climbing 3-5 steps with railing: A lot  Basic Mobility - Total Score: 21                                                             Goals:  Encounter Problems       Encounter Problems (Active)       Pain - Adult            Education Documentation  Precautions, taught by Rani Amaya, PT at 4/23/2025 12:05 PM.  Learner: Patient  Readiness: Acceptance  Method: Explanation  Response: Demonstrated Understanding    Mobility Training, taught by Rani Amaya, PT at 4/23/2025 12:05 PM.  Learner: Patient  Readiness: Acceptance  Method: Explanation  Response: Demonstrated Understanding    Education Comments  No comments found.

## 2025-04-23 NOTE — CARE PLAN
The patient's goals for the shift include      The clinical goals for the shift include        Problem: Pain - Adult  Goal: Verbalizes/displays adequate comfort level or baseline comfort level  Outcome: Progressing     Problem: Safety - Adult  Goal: Free from fall injury  Outcome: Progressing     Problem: Discharge Planning  Goal: Discharge to home or other facility with appropriate resources  Outcome: Progressing     Problem: Chronic Conditions and Co-morbidities  Goal: Patient's chronic conditions and co-morbidity symptoms are monitored and maintained or improved  Outcome: Progressing     Problem: Nutrition  Goal: Nutrient intake appropriate for maintaining nutritional needs  Outcome: Progressing     Problem: Fall/Injury  Goal: Not fall by end of shift  Outcome: Progressing  Goal: Be free from injury by end of the shift  Outcome: Progressing  Goal: Verbalize understanding of personal risk factors for fall in the hospital  Outcome: Progressing  Goal: Verbalize understanding of risk factor reduction measures to prevent injury from fall in the home  Outcome: Progressing  Goal: Use assistive devices by end of the shift  Outcome: Progressing  Goal: Pace activities to prevent fatigue by end of the shift  Outcome: Progressing

## 2025-04-23 NOTE — PROGRESS NOTES
Social work consult placed for positive medical risk screen. SW reviewed pt's chart and communicated with TCC. No SW needs foreseen at this time. SW signing off; available upon request.    Lee Briseno, MSW, LSW ([phone])   Care Transitions

## 2025-04-23 NOTE — H&P
[facility] - GENERAL MEDICINE HISTORY AND PHYSICAL    HISTORY OF PRESENT ILLNESS     History Obtained From (Primary Source): Patient  Collateral History (Secondary Sources): D/w ED physician    History Of Present Illness (HPI):  Funmilayo Montenegro is a 80 y.o. female with PMHx s/f HTN, DM2, COPD, depression, osteoporosis presenting with TIA and dizziness. Pt has been having intermittent vertigo episodes for the past several months. These have been happening roughly once a week lately and are becoming more frequent. She gets several minutes of vertigo with an accompanying headache. Family has also noted that she has seemed more confused lately. She has not lost consciousness or fallen recently or injured her self. She does not her blood pressure sometimes runs low in the AM. She saw her PCP earlier today to be evaluated for this. While there her PCP noted some possible R facial drooping and pt noted some vision changes, so pt was advised to go to the ER. Pt currently at baseline with no symptoms. It [birth year] today. Denies chest pain, palpitations, abdominal pain, focal weakness, paresthesias, or other symptoms. Pt does note some vision changes in her R eye at times, but peripheral vision is intact. Pt's PCP did tell her that she had a stroke at some point in the past during a routine appointment a few weeks ago, but she has not been worked up for this yet.    ED Course:   Vitals on presentation: T 36.6 °C (97.9 °F)  HR 75  /74  RR 16  O2 96 % None (Room air)  Labs:   CBC with WBC 9.6, Hgb 13.2, Plts 310.   CMP with glucose 236, Na 138, K 4.4, BUN 16, sCr 0.65, alk phos 42, ALT 21, AST 16, bilirubin 0.4.  BNP 42. Trop 4 -> 3.   Lactate 2.1 -> 1.7  INR 1.0  Flu and COVID PCR negative  UA 1+ protein, 1+ glucose  EKG: sinus rhythm at 69 bpm, no ST changes  Imaging - CXR - No evidence of acute intrathoracic abnormality.   CT head wout contrast - No evidence of acute cortical infarct or  intracranial hemorrhage.   Old-appearing lacunar type ischemia of the right thalamus. MRI may be   obtained for more sensitive assessment of underlying acute ischemia.     agree with radiology interpretation(s):   Interventions:  ml bolus, Pt admitted for further care.    12-point ROS reviewed and found to be negative aside from aforementioned positives in HPI and/or noted in dedicated ROS section below.     Decision made to admit the patient to the hospitalist service after evaluation of the patient, review of the above, and discussion with ED provider.     LABS AND IMAGING     I have personally reviewed the following labs on 04/22/25: CBC, CMP, Troponin, BNP, UA, Lactate, PT/INR, and Viral / Respiratory Panel  I have personally reviewed the following imaging studies on 04/22/25: CXR and CT Head without Contrast , with my personal interpretations as documented in ED course above.   I have personally reviewed any obtained EKGs on 04/22/25, with my interpretation as listed above in the ED summary course.   I have personally reviewed the patient's vitals on presentation to the ED and any/all changes through to time of admission (on 04/22/25).     ED Course (From ED Provider):  ED Course as of 04/22/25 2124 Tue Apr 22, 2025 1654 EKG performed 4/22/2025 at 4:49 PM.  Sinus rhythm with a ventricular rate of 69 bpm, GA interval 153 ms, QT/QTc of 380/407 ms.  No STEMI.  Interpreted by attending physician. [CJ]   1840 Hospitalist paged for admission  [CJ]   2038 Spoke to the hospitalist who agreed to admit the patient for observation.  [CJ]      ED Course User Index  [CJ] Avery Mack PA-C         Diagnoses as of 04/22/25 2124   Dizziness   Positive Romberg test     Relevant Results  Results for orders placed or performed during the hospital encounter of 04/22/25 (from the past 24 hours)   CBC and Auto Differential   Result Value Ref Range    WBC 9.6 4.4 - 11.3 x10*3/uL    nRBC 0.0 0.0 - 0.0 /100 WBCs    RBC 4.43  4.00 - 5.20 x10*6/uL    Hemoglobin 13.2 12.0 - 16.0 g/dL    Hematocrit 40.9 36.0 - 46.0 %    MCV 92 80 - 100 fL    MCH 29.8 26.0 - 34.0 pg    MCHC 32.3 32.0 - 36.0 g/dL    RDW 15.3 (H) 11.5 - 14.5 %    Platelets 310 150 - 450 x10*3/uL    Neutrophils % 67.4 40.0 - 80.0 %    Immature Granulocytes %, Automated 0.4 0.0 - 0.9 %    Lymphocytes % 21.8 13.0 - 44.0 %    Monocytes % 7.8 2.0 - 10.0 %    Eosinophils % 2.2 0.0 - 6.0 %    Basophils % 0.4 0.0 - 2.0 %    Neutrophils Absolute 6.46 (H) 1.60 - 5.50 x10*3/uL    Immature Granulocytes Absolute, Automated 0.04 0.00 - 0.50 x10*3/uL    Lymphocytes Absolute 2.09 0.80 - 3.00 x10*3/uL    Monocytes Absolute 0.75 0.05 - 0.80 x10*3/uL    Eosinophils Absolute 0.21 0.00 - 0.40 x10*3/uL    Basophils Absolute 0.04 0.00 - 0.10 x10*3/uL   Comprehensive metabolic panel   Result Value Ref Range    Glucose 236 (H) 74 - 99 mg/dL    Sodium 138 136 - 145 mmol/L    Potassium 4.4 3.5 - 5.3 mmol/L    Chloride 99 98 - 107 mmol/L    Bicarbonate 30 21 - 32 mmol/L    Anion Gap 13 10 - 20 mmol/L    Urea Nitrogen 16 6 - 23 mg/dL    Creatinine 0.65 0.50 - 1.05 mg/dL    eGFR 89 >60 mL/min/1.73m*2    Calcium 9.4 8.6 - 10.3 mg/dL    Albumin 4.2 3.4 - 5.0 g/dL    Alkaline Phosphatase 42 33 - 136 U/L    Total Protein 7.1 6.4 - 8.2 g/dL    AST 16 9 - 39 U/L    Bilirubin, Total 0.4 0.0 - 1.2 mg/dL    ALT 21 7 - 45 U/L   aPTT   Result Value Ref Range    aPTT 35 26 - 36 seconds   Protime-INR   Result Value Ref Range    Protime 11.3 9.8 - 12.4 seconds    INR 1.0 0.9 - 1.1   Lactate   Result Value Ref Range    Lactate 2.1 (H) 0.4 - 2.0 mmol/L   Troponin I, High Sensitivity, Initial   Result Value Ref Range    Troponin I, High Sensitivity 4 0 - 13 ng/L   Sars-CoV-2 PCR   Result Value Ref Range    Coronavirus 2019, PCR Not Detected Not Detected   Influenza A, and B PCR   Result Value Ref Range    Flu A Result Not Detected Not Detected    Flu B Result Not Detected Not Detected   Urinalysis with Reflex Culture and  Microscopic   Result Value Ref Range    Color, Urine Light-Yellow Light-Yellow, Yellow, Dark-Yellow    Appearance, Urine Clear Clear    Specific Gravity, Urine 1.016 1.005 - 1.035    pH, Urine 6.0 5.0, 5.5, 6.0, 6.5, 7.0, 7.5, 8.0    Protein, Urine 30 (1+) (A) NEGATIVE, 10 (TRACE), 20 (TRACE) mg/dL    Glucose, Urine 100 (1+) (A) Normal mg/dL    Blood, Urine NEGATIVE NEGATIVE mg/dL    Ketones, Urine NEGATIVE NEGATIVE mg/dL    Bilirubin, Urine NEGATIVE NEGATIVE mg/dL    Urobilinogen, Urine Normal Normal mg/dL    Nitrite, Urine NEGATIVE NEGATIVE    Leukocyte Esterase, Urine NEGATIVE NEGATIVE   Urinalysis Microscopic   Result Value Ref Range    WBC, Urine 1-5 1-5, NONE /HPF    RBC, Urine 1-2 NONE, 1-2, 3-5 /HPF    Mucus, Urine FEW Reference range not established. /LPF   Troponin, High Sensitivity, 1 Hour   Result Value Ref Range    Troponin I, High Sensitivity 3 0 - 13 ng/L   Lactate   Result Value Ref Range    Lactate 1.7 0.4 - 2.0 mmol/L      Imaging  CT head wo IV contrast  Result Date: 4/22/2025  No evidence of acute cortical infarct or intracranial hemorrhage.   Old-appearing lacunar type ischemia of the right thalamus. MRI may be obtained for more sensitive assessment of underlying acute ischemia.   MACRO: None     Signed by: Ted Givens 4/22/2025 6:31 PM Dictation workstation:   [id number]    XR chest 1 view  Result Date: 4/22/2025  No evidence of acute intrathoracic abnormality.   Signed by: Luis Oliveira 4/22/2025 5:16 PM Dictation workstation:   [id number]      Cardiology, Vascular, and Other Imaging  No other imaging results found for the past 2 days       PAST HISTORIES AND ALLERGIES     Past Medical History  She has a past medical history of COPD (chronic obstructive pulmonary disease) (Multi), Depression, DM2 (diabetes mellitus, type 2) (Multi), HTN (hypertension), and Osteoporosis.    Surgical History  She has no past surgical history on file.     Social History  She reports that she quit smoking  about 15 months ago. Her smoking use included cigarettes. She started smoking about 61 years ago. She has a 45 pack-year smoking history. She has never used smokeless tobacco. She reports that she does not currently use alcohol. She reports that she does not currently use drugs.    Family History  Family History[1]    Allergies  Omeprazole, Ace inhibitors, Bupropion, Bupropion hcl, Codeine, Penicillins, and Sulfamethoxazole-trimethoprim    MEDICATIONS     Scheduled Medications:  Scheduled Medications[2]  Continuous Medications:  Continuous Medications[3]  PRN Medications:  PRN Medications[4]     REVIEW OF SYSTEMS     Review of Systems   Constitutional:  Negative for activity change, appetite change, chills, diaphoresis, fatigue and fever.   HENT:  Negative for congestion, ear pain, rhinorrhea, sinus pain and sore throat.    Eyes:  Positive for visual disturbance.   Respiratory:  Negative for apnea, cough, chest tightness, shortness of breath, wheezing and stridor.    Cardiovascular:  Negative for chest pain, palpitations and leg swelling.   Gastrointestinal:  Negative for abdominal distention, abdominal pain, constipation, diarrhea, nausea and vomiting.   Genitourinary:  Negative for difficulty urinating, dysuria, flank pain, frequency, hematuria and urgency.   Musculoskeletal:  Negative for arthralgias, back pain, gait problem, joint swelling and myalgias.   Skin:  Negative for color change, pallor, rash and wound.   Neurological:  Positive for dizziness, facial asymmetry and headaches. Negative for seizures, syncope, speech difficulty, weakness, light-headedness and numbness.   Psychiatric/Behavioral:  Negative for agitation, behavioral problems, confusion and decreased concentration. The patient is not nervous/anxious.    All other systems reviewed and are negative.      OBJECTIVE     Last Recorded Vitals  /85   Pulse 79   Temp 36.6 °C (97.9 °F)   Resp 16   Wt 53.1 kg (117 lb)   SpO2 96%      Physical  Exam:  Vital signs and nursing notes reviewed.   Constitutional: Pleasant and cooperative. Laying in bed in no acute distress. Conversant.   Skin: Warm and dry; no obvious lesions, rashes, pallor, or jaundice.   Eyes: EOMI. Anicteric sclera.   ENT: Mucous membranes moist; no obvious injury or deformity appreciated.   Head and Neck: Normocephalic, atraumatic. ROM preserved. Trachea midline. No appreciable JVD.   Respiratory: Nonlabored on RA. Lungs clear to auscultation bilaterally without obvious adventitious sounds. Chest rise is equal.  Cardiovascular: RRR. No gross murmur, gallop, or rub. Extremities are warm and well-perfused with good capillary refill (< 3 seconds). No chest wall tenderness.   GI: Abdomen soft, nontender, nondistended. No obvious organomegaly appreciated. Bowel sounds are present.  : No CVA tenderness.   MSK: No gross abnormalities appreciated. No limitations to AROM/PROM appreciated.   Extremities: No cyanosis, edema, or clubbing evident. Neurovascularly intact.   Neuro: A&Ox3. CN 2-12 grossly intact. Able to respond to questions appropriately and clearly. No acute focal neurologic deficits appreciated.  Psych: Appropriate mood and behavior.    ASSESSMENT AND PLAN   Assessment/Plan     80 y.o. female with PMHx s/f HTN, DM2, COPD, depression, osteoporosis presenting with TIA and dizziness.     Plan:  Admit to observation.    TIA/vertigo:  CT head showed old lacunar type infarct of the R thalamus.  MRI brain and CTA head/neck ordered  Aspirin and statin ordered.  TTE ordered.  Will hold bp meds and allow some permissive HTN while admitted.  Neuro checks q4h  NIHSS = 0 currently. Continue to monitor.    DM2:  SSI ordered while admitted.  Accuchecks, hypoglycemic protocol ordered.    COPD:  Continue home inhalers    Depression:  Continue home Zoloft    Diet: Carb controlled  DVT Prophylaxis: Lovenox   Code Status: Full Code   Case Discussed With: ED provider  Additional Sources Reviewed: ED note  day of admission; past PCP notes, past admission notes    Anticipated Length of Stay (LOS): Patient will require one-to-two midnight stay for further evaluation and management, pending results of above and/or input from consultants.      DO Eyad Ashton dictation software was used to dictate this note and thus there may be minor errors in translation/transcription including garbled speech or misspellings. Please contact for clarification if needed.       [1] No family history on file.  [2] [Held by provider] amLODIPine, 10 mg, oral, Daily  [START ON 4/23/2025] aspirin, 81 mg, oral, Daily  atorvastatin, 40 mg, oral, Nightly  [Held by provider] carvedilol, 6.25 mg, oral, BID  enoxaparin, 40 mg, subcutaneous, q24h  [START ON 4/23/2025] tiotropium, 2 puff, inhalation, Daily   And  formoterol, 20 mcg, nebulization, q12h  [START ON 4/23/2025] insulin lispro, 0-5 Units, subcutaneous, TID AC  iohexol, 75 mL, intravenous, Once in imaging  [Held by provider] isosorbide mononitrate ER, 60 mg, oral, Daily  [START ON 4/23/2025] sertraline, 100 mg, oral, Daily     [3]    [4] PRN medications: albuterol, dextrose, dextrose, glucagon, glucagon

## 2025-04-23 NOTE — CONSULTS
[facility] Consult Note    Inpatient consult to Neurology  Consult performed by: ZAYRA Muñoz-CNP  Consult ordered by: Samson Oshea DO         Subjective   Funmilayo Kera Montenegro is a 80 y.o. female on day 0 of admission with a history of HTN, DM, COPD, depression and osteoporosis presenting with TIA (transient ischemic attack). Neurology was consulted for TIA/dizziness.    History obtained from patient, family and chart review.    Pt presented for vertigo complaints. States it started s/p fall 1-2 years ago. Thinks it has been becoming more frequent. States happens nearly daily. May be 1-2 times a day is more severe but constant otherwise if turning head or moving. Does endorse nausea and vomiting at times with it. Also states she has been having headaches since that fall as well.     HAs start in posterior head at base of skull. Can radiate forward. Up to 7-8/10. Describes as dull throb, then grows to pressure. + light and sound sensitivity. HAs seem to be worse on her bad dizziness days. Endorses constant pain/muscle tension to bilateral trapezius muscles.     Per family, pt has done therapy in the past for other issues, they did give patient exercises for vertigo but patient tends to not stick with her therapy plans. Pt states those exercises did help in the past.     She is on 81 mg aspirin daily and atorvastatin 40 mg daily.    It seems patient was at her PCP office yesterday for evaluation and there was concern for possible R facial droop. This was not noted on arrival. Pt admits to intermittent blurred vision as well. Ongoing issue.     Medical History[1]  Surgical History[2]  No relevant family history has been documented for this patient.   Social History     Substance and Sexual Activity   Drug Use Not Currently     Tobacco Use: Medium Risk (4/22/2025)    Patient History     Smoking Tobacco Use: Former     Smokeless Tobacco Use: Never     Passive Exposure: Not on file     Alcohol Use: Not  At Risk (4/22/2025)    AUDIT-C     Frequency of Alcohol Consumption: Never     Average Number of Drinks: Patient does not drink     Frequency of Binge Drinking: Never       10 point review of systems performed and is negative except as noted in the HPI.        Objective   Vitals:    04/23/25 0141 04/23/25 0625 04/23/25 0747 04/23/25 0900   BP: 160/82 143/89  163/75   BP Location: Right arm Right arm  Right arm   Patient Position: Lying Lying  Lying   Pulse: 77 71  71   Resp: 16 16  18   Temp: 36.1 °C (97 °F) 36 °C (96.8 °F)  36.5 °C (97.7 °F)   TempSrc: Temporal Temporal  Temporal   SpO2: 92% 90% 97% 96%   Weight:       Height:             Physical Exam  Vitals reviewed.   Eyes:      General: Lids are normal.      Extraocular Movements: Extraocular movements intact.      Pupils: Pupils are equal, round, and reactive to light.   Neurological:      Motor: Motor strength is normal.  Psychiatric:         Speech: Speech normal.       Neurological Exam  Mental Status  Awake, alert and oriented to person, place and time. Speech is normal. Language is fluent with no aphasia. Attention and concentration are normal. Fund of knowledge is appropriate for level of education.    Cranial Nerves  CN II: Visual acuity is normal. Visual fields full to confrontation. Right funduscopic exam: not visualized. Left funduscopic exam: not visualized.  CN III, IV, VI: Extraocular movements intact bilaterally. No obvious nystagmus in any direction but bilateral horizontal gazes induce significant vertigo. Normal lids and orbits bilaterally. Pupils equal round and reactive to light bilaterally.  CN V: Facial sensation is normal.  CN VII: Full and symmetric facial movement.  CN VIII: Hearing is normal.  CN IX, X: Palate elevates symmetrically. Normal gag reflex.  CN XI: Shoulder shrug strength is normal.  CN XII: Tongue midline without atrophy or fasciculations.    Motor  Normal muscle bulk throughout. No fasciculations present. Normal muscle  tone. No abnormal involuntary movements. Strength is 5/5 throughout all four extremities.    Sensory  Light touch is normal in upper and lower extremities.     Coordination  Right: Finger-to-nose normal. Heel-to-shin normal.Left: Finger-to-nose normal. Heel-to-shin normal.      Scheduled medications  Scheduled Medications[3]  Continuous medications  Continuous Medications[4]  PRN medications  PRN Medications[5]     Lab Results   Component Value Date    WBC 9.6 04/22/2025    RBC 4.43 04/22/2025    HGB 13.2 04/22/2025    HCT 40.9 04/22/2025     04/22/2025     04/22/2025    K 4.4 04/22/2025    CL 99 04/22/2025    BUN 16 04/22/2025    CREATININE 0.65 04/22/2025    EGFR 89 04/22/2025    CALCIUM 9.4 04/22/2025    ALKPHOS 37 04/22/2025    AST 26 04/22/2025    ALT 20 04/22/2025    VITD25 26 (A) 08/12/2022    HGBA1C 8.4 (H) 07/27/2024    LDLCALC 64 04/22/2025    CHOL 140 04/22/2025    HDL 46.2 04/22/2025    TRIG 149 04/22/2025    TSH 2.76 05/14/2022    TSH 3.25 02/17/2022       Below CT and MRI images and reports have been personally reviewed in PACS, agree with interpretations.    MR brain wo IV contrast 04/22/2025    Narrative  Interpreted By:  Bethany Butts,  STUDY:  MR BRAIN WO IV CONTRAST;  4/22/2025 10:18 pm    INDICATION:  Signs/Symptoms:tia.      COMPARISON:  Same day noncontrast CT head.    ACCESSION NUMBER(S):  [id number]    ORDERING CLINICIAN:  MELANIE ADAMS    TECHNIQUE:  Axial T2, FLAIR, DWI, gradient echo T2 and sagittal and coronal T1  weighted images of brain were acquired.    FINDINGS:  Images are degraded by motion.    No diffusion restriction abnormality is present to suggest an acute  infarct.    There is no evidence of recent intracranial hemorrhage. No mass  effect or midline shift is present. Scattered foci of dark signal on  gradient echo imaging within the bilateral cerebral hemispheres may  represent sequela of remote petechial hemorrhage.    No abnormal ventricular dilatation  is present. Basal cisterns are  patent. No extra-axial collections are identified.    Focus of hyperintense T2 signal in the right thalamus likely  represent sequela of remote lacunar infarct. Moderate volume of  confluent periventricular and subcortical white matter signal changes  in the bilateral cerebral hemispheres likely represent sequela of  microvascular disease.    Visualized proximal large arterial flow voids are preserved within  limits of mild motion, including bilateral carotid and basilar artery.    No abnormal fluid signal is present in the paranasal sinuses or  mastoid air cells.    Impression  1. No evidence of diffusion restriction abnormality to suggest a  recent infarct, within limits of motion.  2. Foci of hyperintense T2 signal in the right thalamus likely  represent sequela of remote lacunar infarcts.  3. Confluent areas of hyperintense FLAIR and T2 signal present in the  periventricular and subcortical white matter of bilateral cerebral  hemispheres likely represent sequela of microvascular disease.    MACRO:  None    Signed by: Bethany Butts 4/22/2025 11:23 PM  Dictation workstation:   [id number]      CT angio head and neck w and wo IV contrast 04/22/2025    Narrative  Interpreted By:  Bethany Butts,  STUDY:  CT ANGIO HEAD AND NECK W AND WO IV CONTRAST;  4/22/2025 9:55 pm    INDICATION:  Signs/Symptoms:tia.      COMPARISON:  Same day noncontrast CT head.    ACCESSION NUMBER(S):  [id number]    ORDERING CLINICIAN:  MELANIE ADAMS    TECHNIQUE:  After 75 mL of Omnipaque 350 was administered intravenously and axial  images of the head and neck were acquired.  Coronal, sagittal, and  3-D reconstructions were provided for review.    FINDINGS:  Please refer to separately dictated same day noncontrast CT of the  head for further characterization of the nonvascular intracranial  findings.    CTA HEAD FINDINGS:    Intracranial carotid arteries demonstrate symmetric  opacification  bilaterally without evidence of occlusion or stenosis. Scattered  areas of calcified plaque are present in the cavernous and ophthalmic  segments.    Anterior cerebral arteries demonstrate symmetric opacification  without evidence of proximal occlusion.    Middle cerebral arteries are symmetric in appearance, without  evidence of high-grade stenosis in the M1 segments or focal vessel  occlusion in the more distal cortical M2 and M3 branches.    Intracranial vertebral arteries demonstrate symmetric opacification  with mild atherosclerotic plaques bilaterally without evidence of  occlusion. Basilar artery is not demonstrate any evidence of  occlusion or stenosis.    Posterior cerebral arteries demonstrate symmetric opacification  proximally without evidence of occlusion.    No enhancing masses or vascular malformations are identified.    CTA NECK FINDINGS:    Partially visualized pulmonary arteries unremarkable in appearance.    There is a three-vessel aortic arch, with some atherosclerotic  changes present within the arch in the origin of the great vessels,  likely contributing to mild stenosis in the left subclavian artery.    Common carotid arteries are somewhat tortuous in course and  demonstrate scattered areas of calcified plaque without evidence of  occlusion or stenosis.    Calcified plaque is present in the proximal extracranial internal  carotid arteries bilaterally, right-greater-than-left, but without  evidence of significant stenosis by NASCET. More distally the  extracranial internal carotid arteries are tortuous in course but do  not demonstrate any evidence of occlusion or stenosis.    Extracranial vertebral arteries demonstrate symmetric opacification  bilaterally, without evidence of occlusion or stenosis. They  originate from the subclavian arteries.    Soft tissues of the neck and skull base do not demonstrate any acute  abnormality. A 1.3 cm nodule is present in the left thyroid  lobe.    Mild paraseptal emphysematous changes are present in the included  lung apices.    Mild degenerative changes are present in the cervical spine without  evidence of acute abnormality.    Impression  1.  No large vessel occlusion is identified in the proximal  intracranial circulation.  2. Although there is some calcified plaques present in the proximal  extracranial internal carotid arteries bilaterally, there is no  significant stenosis by NASCET criteria.  3. Mild paraseptal emphysematous changes are present in the upper  lungs bilaterally.    MACRO:  None    Signed by: eBthany Butts 4/22/2025 11:28 PM  Dictation workstation:   [id number]      CT head wo IV contrast 04/22/2025    Narrative  Interpreted By:  Ted Givens,  STUDY:  CT HEAD WO IV CONTRAST;  4/22/2025 5:57 pm    INDICATION:  Signs/Symptoms:dizziness/concern for stroke.    COMPARISON:  None.    ACCESSION NUMBER(S):  [id number]    ORDERING CLINICIAN:  FLAVIO ORDAZ    TECHNIQUE:  Noncontrast axial CT scan of head was performed. Angled reformats in  brain and bone windows were generated. The images were reviewed in  bone, brain, blood and soft tissue windows.    FINDINGS:  CSF Spaces: The ventricles, sulci and basal cisterns are within  normal limits. There is no extraaxial fluid collection.    Parenchyma: Stable appearing parenchymal atrophy. Periventricular and  subcortical white matter hypoattenuation is nonspecific, however  likely reflects chronic microvascular ischemic versus chronic  hypertensive changes. Old-appearing lacunar type ischemia of the  right thalamus. The grey-white differentiation is intact. There is no  mass effect or midline shift.  There is no intracranial hemorrhage.    Calvarium: No acute displaced calvarial fracture.    Paranasal sinuses and mastoids: Visualized paranasal sinuses and  mastoids are clear.    Impression  No evidence of acute cortical infarct or intracranial hemorrhage.    Old-appearing lacunar  type ischemia of the right thalamus. MRI may be  obtained for more sensitive assessment of underlying acute ischemia.    MACRO:  None      Signed by: Ted Givens 4/22/2025 6:31 PM  Dictation workstation:   [id number]      NIH Stroke Scale  1A. Level of Consciousness: Alert, Keenly Responsive  1B. Ask Month and Age: Both Questions Right  1C. Blink Eyes & Squeeze Hands: Performs Both Tasks  2. Best Gaze: Normal  3. Visual: No Visual Loss  4. Facial Palsy: Normal Symmetrical Movements  5A. Motor - Left Arm: No Drift  5B. Motor - Right Arm: No Drift  6A. Motor - Left Leg: No Drift  6B. Motor - Right Leg: No Drift  7. Limb Ataxia: Absent  8. Sensory Loss: Normal  9. Best Language: No Aphasia  10. Dysarthria: Normal  11. Extinction and Inattention: No Abnormality  NIH Stroke Scale: 0      [place] Coma Scale  Best Eye Response: Spontaneous  Best Verbal Response: Oriented  Best Motor Response: Follows commands  [place] Coma Scale Score: 15        Assessment/Plan      Assessment & Plan  TIA (transient ischemic attack)    Primary hypertension    Recurrent major depression in full remission (CMS-HCC)    Type 2 diabetes mellitus without complication, without long-term current use of insulin      IMPRESSION:  Funmilayo Montenegro is a 80 y.o. female with a history of HTN, DM, COPD, depression and osteoporosis presenting with TIA (transient ischemic attack). Neurology was consulted for TIA/dizziness. Vertigo type dizziness s/p fall 1-2 years ago. Also with migrainous headaches.   HCT - no acute findings, old appearing lacunar infarct of R thalamus  CTA head/neck - no significant stenosis  MRI brain wo contrast - remote lacunar infarcts to R thalamus  Lipid panel with LDL 64, CHOL 140,   A1C pending  TTE pending    Vertigo, x 1-2 years   Positional, unable to do yonis-hallpike, pt having severe vertigo just with moving eyes to side.   Seems consistent with peripheral vestibular dysfunction  No acute stroke on MRI  Remote R  thalamic stroke  HTN  HLD  Reported R facial droop  Resolved, pt unsure of this. No other focal deficits.   Discussed old stroke with patient as well, defer escalating antiplatelet at this time  Headaches  Some migrainous features  ? Cervicogenic related, c/o a lot of muscle tension/spasms to bilateral trapezius muscles    RECOMMENDATIONS:  Continue supportive care  Continue home 81 mg aspirin daily  Continue home HI statin daily  Continue to monitor and manage vascular risk factors  Continue PT for vestibular therapy and neck stretches  A1C results pending  TTE results pending  If no improvement to migraines/HAs after PT, then consider outpatient neuro eval for medications  If still having vertigo despite vestibular therapy, then consider ENT evaluation outpatient.     Discussed with patient and family, all questions answered.   Discussed with Dr. Song and primary team via secure chat.   Ok to discharge from neurology standpoint.        I personally spent 75 minutes today, exclusive of procedures, providing care for this patient, including preparation, face to face time, documentation and other services such as review of medical records, diagnostic result, patient education, counseling, coordination of care as specified in the encounter.    Mary Harp, APRN-CNP           [1]   Past Medical History:  Diagnosis Date    COPD (chronic obstructive pulmonary disease) (Multi)     Depression     DM2 (diabetes mellitus, type 2) (Multi)     HTN (hypertension)     Osteoporosis    [2] No past surgical history on file.  [3] [Held by provider] amLODIPine, 10 mg, oral, Daily  aspirin, 81 mg, oral, Daily  atorvastatin, 40 mg, oral, Nightly  [Held by provider] carvedilol, 6.25 mg, oral, BID  enoxaparin, 40 mg, subcutaneous, q24h  tiotropium, 2 puff, inhalation, Daily   And  formoterol, 20 mcg, nebulization, q12h  insulin lispro, 0-5 Units, subcutaneous, TID AC  [Held by provider] isosorbide mononitrate ER, 60 mg, oral,  Daily  sertraline, 100 mg, oral, Daily    [4]    [5] PRN medications: albuterol, dextrose, dextrose, glucagon, glucagon, guaiFENesin, meclizine

## 2025-04-24 ENCOUNTER — PATIENT OUTREACH (OUTPATIENT)
Dept: PRIMARY CARE | Facility: CLINIC | Age: 80
End: 2025-04-24
Payer: MEDICARE

## 2025-04-24 NOTE — PROGRESS NOTES
Discharge Facility: [facility]  Discharge Diagnosis: TIA  Admission Date: 4/22/25  Discharge Date:  4/23/25    PCP Appointment Date: 4/30/25  Specialist Appointment Date: Referral to PT  Hospital Encounter and Summary Linked: ED to Hosp-Admission (Discharged) with Ishan Boyd MD PhD; Anabel Watts MD (04/22/2025)   Discharge Summary by Ishan Boyd MD PhD (04/23/2025 12:24)   See discharge assessment below for further details    Wrap Up  Wrap Up Additional Comments: TCM Call completed. Pt. Verbalized she is feeling better. She is eating/drinking okay. She reports monitoring her BG. She uses a walker for ambulation. Denies further questions/concerns at this time. This callers contact information for non-emergent questions/concerns. (4/24/2025 11:24 AM)    Engagement  Call Start Time: 1124 (Call completed with Funmilayo) (4/24/2025 11:24 AM)    Medications  Medications reviewed with patient/caregiver?: Yes (4/24/2025 11:24 AM)  Is the patient having any side effects they believe may be caused by any medication additions or changes?: No (4/24/2025 11:24 AM)  Does the patient have all medications ordered at discharge?: Yes (4/24/2025 11:24 AM)  Care Management Interventions: No intervention needed (4/24/2025 11:24 AM)  Prescription Comments: START taking: meclizine (Antivert) (4/24/2025 11:24 AM)  Is the patient taking all medications as directed (includes completed medication regime)?: Yes (4/24/2025 11:24 AM)  Care Management Interventions: Provided patient education (4/24/2025 11:24 AM)  Medication Comments: Pt. had to take PRN Meclizine this AM prior to waking up, she states it did help her, although makes her tired. (4/24/2025 11:24 AM)    Appointments  Does the patient have a primary care provider?: Yes (4/24/2025 11:24 AM)  Care Management Interventions: Verified appointment date/time/provider; Educated patient on importance of making appointment (4/24/2025 11:24 AM)  Has the patient kept scheduled  appointments due by today?: Yes (4/24/2025 11:24 AM)  Care Management Interventions: Advised patient to keep appointment; Educated on importance of keeping appointment; Advised to schedule with specialist (4/24/2025 11:24 AM)    Self Management  What is the home health agency?: Out patient PT referral. (4/24/2025 11:24 AM)  Has home health visited the patient within 72 hours of discharge?: Not applicable (4/24/2025 11:24 AM)    Patient Teaching  Does the patient have access to their discharge instructions?: Yes (4/24/2025 11:24 AM)  Care Management Interventions: Reviewed instructions with patient (4/24/2025 11:24 AM)  What is the patient's perception of their health status since discharge?: Improving (4/24/2025 11:24 AM)  Is the patient/caregiver able to teach back the hierarchy of who to call/visit for symptoms/problems? PCP, Specialist, Home Health nurse, Urgent Care, ED, 911: Yes (4/24/2025 11:24 AM)  Patient/Caregiver Education Comments: Pt. denies any new concerns since hospital discharge. (4/24/2025 11:24 AM)

## 2025-04-26 ASSESSMENT — ENCOUNTER SYMPTOMS
TREMORS: 0
WEAKNESS: 1
NUMBNESS: 1
HEADACHES: 1
SPEECH DIFFICULTY: 0

## 2025-04-29 ENCOUNTER — APPOINTMENT (OUTPATIENT)
Dept: PHARMACY | Facility: HOSPITAL | Age: 80
End: 2025-04-29
Payer: MEDICARE

## 2025-04-29 DIAGNOSIS — E11.9 TYPE 2 DIABETES MELLITUS WITHOUT COMPLICATION, WITHOUT LONG-TERM CURRENT USE OF INSULIN: ICD-10-CM

## 2025-04-29 NOTE — PROGRESS NOTES
I reviewed the progress note and agree with the resident’s findings and plans as written. Case discussed with resident.    Imani Borja, AbigailD

## 2025-04-29 NOTE — PROGRESS NOTES
Pharmacist Clinic: Diabetes Management  Funmilayo Montenegro is a 80 y.o. female was referred to Clinical Pharmacy Team for diabetes management.   Referring Provider: Tasha Calhoun MD  - Last visit with referring provider: 25     Patient Assistance for Trulicity and Stiolto approved through 25. Will have to be renewed prior to that date to prevent lapse in coverage. Medication(s) will be received at no cost to patient from [facility].       Subjective     HPI    Current Diabetes Pharmacotherapy:    - Glimepiride 4 mg BID AC  - Trulicity 0.75 mg weekly -   - Metformin 1000 mg BID  - Pioglitazone 15 mg daily    Social History:  Current diet:   - Breakfast  - Lunch: grabs something on the go  - Dinner: Sometimes eats 2-3 bites and gets full   - Dinner: Chicken, roast beef, pork, veggies  - Drinks a lot of coffee   - Sugar free foods/drinks   - Cookies at night    Current exercise:   - None  - Has cane/walker due to back issues    Eye exam for this year?: No  Foot exam for this year?:  No    Current monitoring regimen:   Patient is using: glucometer  Type of CGM: N/A    Patient is testing blood sugars 2 times a day.    Reported blood sugars:   Fastin - 163    2 hours PP: 158 133 174 180     Any episodes of hypoglycemia? Yes  -  6 -7x in the past months    Adverse Effects:   - None    Objective     There were no vitals taken for this visit.    Allergies   Allergen Reactions    Omeprazole Hives     hives    Ace Inhibitors Hives    Bupropion Hives    Bupropion Hcl Hives     hives    Codeine Other    Penicillins Other    Sulfamethoxazole-Trimethoprim Hives       Historical Diabetes Pharmacotherapy:  - Rybelsus (in replacement of Victoza)    SECONDARY PREVENTION  - Statin? Yes   - ACE-I/ARB? No  - Aspirin? Yes    Pertinent PMH Review:  - PMH of Pancreatitis: No  - PMH of Retinopathy: No  - PMH of Urinary Tract Infections: No  - PMH of Yeast Infections: No  - PMH of MTC: No    Lab  Review  Lab Results   Component Value Date    BILITOT 0.4 04/22/2025    CALCIUM 9.4 04/22/2025    CO2 30 04/22/2025    CL 99 04/22/2025    CREATININE 0.65 04/22/2025    GLUCOSE 236 (H) 04/22/2025    ALKPHOS 37 04/22/2025    K 4.4 04/22/2025    PROT 7.2 04/22/2025     04/22/2025    AST 26 04/22/2025    ALT 20 04/22/2025    BUN 16 04/22/2025    ANIONGAP 13 04/22/2025     07/14/2022    ALBUMIN 4.1 04/22/2025    GFRF >90 07/29/2023     Lab Results   Component Value Date    TRIG 149 04/22/2025    CHOL 140 04/22/2025    HDL 46.2 04/22/2025     Lab Results   Component Value Date    HGBA1C 6.8 (H) 04/23/2025    HGBA1C 8.4 (H) 07/27/2024    HGBA1C 9.0 (H) 02/24/2024     The ASCVD Risk score (Michael ESPINOZA, et al., 2019) failed to calculate for the following reasons:    The 2019 ASCVD risk score is only valid for ages 40 to 79    Drug Interactions:  - None    Affordability/Accessibility:  - [facility] for Trulicity and Stiolto      Preferred Pharmacy:  - [facility] ([place], [place])    Assessment/Plan   Problem List Items Addressed This Visit       Type 2 diabetes mellitus without complication, without long-term current use of insulin    Relevant Orders    Referral to Clinical Pharmacy       ASSESSMENT:  Patients diabetes is controlled with most recent A1c of 6.8% - 4/23/25 (Goal is < 8% due to age + other co morbidities) -     Spoke with patient's son today. Patient's A1c is now controlled. Reports of lows at least 6-7x in the past month. Discussed decreasing glimepiride dose with son today. He stated that he wish to talk to PCP at their office visit tmrw, last apt was cut short due to patient experiencing TIA symptoms and was sent to hospital by PCP. He stated PCP said that will be their main conversation tmrw. Will folllow up in June to reasses and discuss PAP renewal.     PLAN:  CONTINUE all DM medications  Follow up with clinical pharmacist: 6/27/25 @ 9:20   Follow up with PCP: 4/30/25    Thank you,  Jeannette Conroy,  [facility] MED - PGY1 Resident     Continue all meds under the continuation of care with the referring provider and clinical pharmacy team.

## 2025-04-30 ENCOUNTER — APPOINTMENT (OUTPATIENT)
Dept: PRIMARY CARE | Facility: CLINIC | Age: 80
End: 2025-04-30
Payer: MEDICARE

## 2025-04-30 VITALS
SYSTOLIC BLOOD PRESSURE: 126 MMHG | OXYGEN SATURATION: 96 % | HEART RATE: 67 BPM | TEMPERATURE: 97.6 F | DIASTOLIC BLOOD PRESSURE: 58 MMHG

## 2025-04-30 DIAGNOSIS — M81.0 AGE-RELATED OSTEOPOROSIS WITHOUT CURRENT PATHOLOGICAL FRACTURE: ICD-10-CM

## 2025-04-30 DIAGNOSIS — R00.2 PALPITATIONS: ICD-10-CM

## 2025-04-30 DIAGNOSIS — E11.40 TYPE 2 DIABETES MELLITUS WITH DIABETIC NEUROPATHY, WITHOUT LONG-TERM CURRENT USE OF INSULIN: ICD-10-CM

## 2025-04-30 DIAGNOSIS — E11.65 TYPE 2 DIABETES MELLITUS WITH HYPERGLYCEMIA, UNSPECIFIED WHETHER LONG TERM INSULIN USE (MULTI): ICD-10-CM

## 2025-04-30 DIAGNOSIS — I10 ESSENTIAL HYPERTENSION: ICD-10-CM

## 2025-04-30 DIAGNOSIS — E78.2 MIXED HYPERLIPIDEMIA: ICD-10-CM

## 2025-04-30 DIAGNOSIS — F33.42 RECURRENT MAJOR DEPRESSIVE DISORDER, IN FULL REMISSION: ICD-10-CM

## 2025-04-30 DIAGNOSIS — E11.9 TYPE 2 DIABETES MELLITUS WITHOUT COMPLICATION, WITHOUT LONG-TERM CURRENT USE OF INSULIN: ICD-10-CM

## 2025-04-30 DIAGNOSIS — J42 CHRONIC BRONCHITIS, UNSPECIFIED CHRONIC BRONCHITIS TYPE (MULTI): ICD-10-CM

## 2025-04-30 DIAGNOSIS — I63.81 LACUNAR STROKE (MULTI): Primary | ICD-10-CM

## 2025-04-30 PROCEDURE — 1159F MED LIST DOCD IN RCRD: CPT | Performed by: FAMILY MEDICINE

## 2025-04-30 PROCEDURE — 1160F RVW MEDS BY RX/DR IN RCRD: CPT | Performed by: FAMILY MEDICINE

## 2025-04-30 PROCEDURE — 99495 TRANSJ CARE MGMT MOD F2F 14D: CPT | Performed by: FAMILY MEDICINE

## 2025-04-30 PROCEDURE — 1036F TOBACCO NON-USER: CPT | Performed by: FAMILY MEDICINE

## 2025-04-30 PROCEDURE — 99214 OFFICE O/P EST MOD 30 MIN: CPT | Performed by: FAMILY MEDICINE

## 2025-04-30 PROCEDURE — 3074F SYST BP LT 130 MM HG: CPT | Performed by: FAMILY MEDICINE

## 2025-04-30 PROCEDURE — 3078F DIAST BP <80 MM HG: CPT | Performed by: FAMILY MEDICINE

## 2025-04-30 RX ORDER — ISOSORBIDE MONONITRATE 60 MG/1
60 TABLET, EXTENDED RELEASE ORAL DAILY
Qty: 90 TABLET | Refills: 1 | Status: SHIPPED | OUTPATIENT
Start: 2025-04-30 | End: 2026-04-30

## 2025-04-30 RX ORDER — TIOTROPIUM BROMIDE AND OLODATEROL 3.124; 2.736 UG/1; UG/1
2 SPRAY, METERED RESPIRATORY (INHALATION) DAILY
Qty: 4 G | Refills: 11 | Status: SHIPPED | OUTPATIENT
Start: 2025-04-30

## 2025-04-30 RX ORDER — METFORMIN HYDROCHLORIDE 1000 MG/1
1000 TABLET ORAL
Qty: 180 TABLET | Refills: 1 | Status: SHIPPED | OUTPATIENT
Start: 2025-04-30 | End: 2026-04-30

## 2025-04-30 RX ORDER — DULAGLUTIDE 0.75 MG/.5ML
0.75 INJECTION, SOLUTION SUBCUTANEOUS
Qty: 6 ML | Refills: 1 | Status: SHIPPED | OUTPATIENT
Start: 2025-05-04

## 2025-04-30 RX ORDER — GLIPIZIDE 5 MG/1
5 TABLET, FILM COATED, EXTENDED RELEASE ORAL DAILY
Qty: 90 TABLET | Refills: 1 | Status: SHIPPED | OUTPATIENT
Start: 2025-04-30 | End: 2025-10-27

## 2025-04-30 RX ORDER — SERTRALINE HYDROCHLORIDE 100 MG/1
100 TABLET, FILM COATED ORAL DAILY
Qty: 90 TABLET | Refills: 1 | Status: SHIPPED | OUTPATIENT
Start: 2025-04-30 | End: 2026-04-30

## 2025-04-30 RX ORDER — AMLODIPINE BESYLATE 10 MG/1
10 TABLET ORAL DAILY
Qty: 90 TABLET | Refills: 1 | Status: SHIPPED | OUTPATIENT
Start: 2025-04-30 | End: 2026-04-30

## 2025-04-30 RX ORDER — ATORVASTATIN CALCIUM 40 MG/1
40 TABLET, FILM COATED ORAL DAILY
Qty: 90 TABLET | Refills: 1 | Status: SHIPPED | OUTPATIENT
Start: 2025-04-30 | End: 2026-04-30

## 2025-04-30 RX ORDER — PIOGLITAZONE 15 MG/1
15 TABLET ORAL DAILY
Qty: 90 TABLET | Refills: 1 | Status: SHIPPED | OUTPATIENT
Start: 2025-04-30

## 2025-04-30 RX ORDER — CARVEDILOL 6.25 MG/1
6.25 TABLET ORAL
Qty: 180 TABLET | Refills: 1 | Status: SHIPPED | OUTPATIENT
Start: 2025-04-30

## 2025-04-30 RX ORDER — ALENDRONATE SODIUM 70 MG/1
70 TABLET ORAL
Qty: 12 TABLET | Refills: 1 | Status: SHIPPED | OUTPATIENT
Start: 2025-04-30 | End: 2025-10-27

## 2025-04-30 ASSESSMENT — ENCOUNTER SYMPTOMS
ABDOMINAL PAIN: 0
SHORTNESS OF BREATH: 0
ACTIVITY CHANGE: 0
APPETITE CHANGE: 0
FEVER: 0
SPEECH DIFFICULTY: 0
SLEEP DISTURBANCE: 0
CHILLS: 0
DIZZINESS: 0
LIGHT-HEADEDNESS: 1
WEAKNESS: 1

## 2025-04-30 NOTE — PROGRESS NOTES
Subjective   Patient ID: Funmilayo Montenegro is a 80 y.o. female who presents for ER Follow-up (ER follow up, [facility] [[email or web address] 4/23: RE: dizziness/TIA).    HPI   Patient presents for hospital follow up.    Admission date: 4/22/25  Discharge date:  4/23/25  Discharge diagnosis: TIA   Telephone outreach with patient after discharge: 4/24/25  Face-to-Face visit date: 04/30/25  Medical complexity decision-making: moderate  Reviewed discharge summary, lab/test results, and hospital records.  Reconciled med list.   Specialist follow-ups? Vestibular therapy     Patient was seen in the office on April 22, 2025 complaining of worsening headache associated with vision loss of the right eye.  States that her dizziness was worsening and she keeps on wanting the fall on the left side.  She was also having progressive weakness.  She was accompanied by son.  I was concerned about stroke since patient could barely stand up, Romberg positive, could not see from her left eye on exam.  She was admitted to the hospital overnight for observation.  Her discharge diagnosis was TIA.  She was referred to vestibular therapy for the dizziness.  She was prescribed meclizine.  States that she has only been needing to take this medication couple of times and when she does, this helps with her dizziness.  Her weakness slightly improved.  Her blurriness and vision are now both eyes and she has an appointment with the eye doctor coming up.  She continues to ambulate with a walker.  Per son, she has not been as diligent performing exercises shown to her by physical therapy for lower extremity weakness.    They also brought their glucose log with them and there were several readings of fasting glucose in the 60s to 70s.  Per patient, weakness is worse during those episodes.  She is currently taking glimepiride 4 mg twice a day with metformin and Trulicity.  Her last hemoglobin A1c is 6.8.  This is down from 8.3 last year    Review of Systems   Constitutional:   Negative for activity change, appetite change, chills and fever.   Eyes:  Positive for visual disturbance (Still having blurring of her vision of both eyes).   Respiratory:  Negative for shortness of breath.    Cardiovascular:  Negative for chest pain.   Gastrointestinal:  Negative for abdominal pain.   Skin:  Negative for rash.   Neurological:  Positive for weakness and light-headedness. Negative for dizziness, syncope and speech difficulty.   Psychiatric/Behavioral:  Negative for sleep disturbance.        Objective   /58   Pulse 67   Temp 36.4 °C (97.6 °F)   SpO2 96%     Physical Exam  Constitutional:       Appearance: Normal appearance.      Comments: Walks with a walker     Eyes:      Pupils: Pupils are equal, round, and reactive to light.   Cardiovascular:      Rate and Rhythm: Normal rate and regular rhythm.   Pulmonary:      Effort: Pulmonary effort is normal.      Breath sounds: Normal breath sounds.   Abdominal:      General: Abdomen is flat. Bowel sounds are normal.      Palpations: Abdomen is soft.   Musculoskeletal:         General: Normal range of motion.   Skin:     General: Skin is warm.   Neurological:      General: No focal deficit present.      Mental Status: She is alert.      Cranial Nerves: No cranial nerve deficit.      Sensory: No sensory deficit.      Motor: No weakness.   Psychiatric:         Mood and Affect: Mood normal.         Assessment/Plan   Assessment & Plan  Type 2 diabetes mellitus without complication, without long-term current use of insulin  Due to the hypoglycemic symptomatic episodes, we will switch her glimepiride to glipizide extended release.  Discussed taking this with dinnertime.  Discussed with son and patient to continue monitoring blood glucose but to let me know if she has further readings of less than 70.  Will repeat hemoglobin A1c in 3 months.  Orders:    pioglitazone (Actos) 15 mg tablet; Take 1 tablet (15 mg) by mouth once daily.    dulaglutide  (Trulicity) 0.75 mg/0.5 mL pen injector; Inject 0.75 mg under the skin 1 (one) time per week.    metFORMIN (Glucophage) 1,000 mg tablet; Take 1 tablet (1,000 mg) by mouth 2 times daily (morning and late afternoon).    glipiZIDE XL (Glucotrol XL) 5 mg 24 hr tablet; Take 1 tablet (5 mg) by mouth once daily. Do not crush, chew, or split.    Comprehensive Metabolic Panel; Future    Hemoglobin A1C; Future    CBC and Auto Differential; Future    Recurrent major depressive disorder, in full remission  Stable  Orders:    sertraline (Zoloft) 100 mg tablet; Take 1 tablet (100 mg) by mouth once daily.    Lacunar stroke (Multi)  Will obtain Holter monitor at this time to see if she has irregular rhythm that would cause TIA or stroke.  Orders:    Holter or Event Cardiac Monitor; Future    Type 2 diabetes mellitus with diabetic neuropathy, without long-term current use of insulin  Patient's son is requesting referral to podiatry since patient is having a hard time cutting her nails.  Orders:    Referral to Podiatry; Future    Essential hypertension    Orders:    amLODIPine (Norvasc) 10 mg tablet; Take 1 tablet (10 mg) by mouth once daily.    carvedilol (Coreg) 6.25 mg tablet; Take 1 tablet (6.25 mg) by mouth 2 times daily (morning and late afternoon).    isosorbide mononitrate ER (Imdur) 60 mg 24 hr tablet; Take 1 tablet (60 mg) by mouth once daily.    Mixed hyperlipidemia    Orders:    atorvastatin (Lipitor) 40 mg tablet; Take 1 tablet (40 mg) by mouth once daily.    Type 2 diabetes mellitus with hyperglycemia, unspecified whether long term insulin use (Multi)         Chronic bronchitis, unspecified chronic bronchitis type (Multi)    Orders:    tiotropium-olodateroL (Stiolto Respimat) 2.5-2.5 mcg/actuation mist inhaler; Inhale 2 Inhalations once daily.    Age-related osteoporosis without current pathological fracture    Orders:    alendronate (Fosamax) 70 mg tablet; Take 1 tablet (70 mg) by mouth every 7 days. Take in the  morning with a full glass of water, on an empty stomach, and do not take anything else by mouth or lie down for the next 30 min.    Palpitations    Orders:    Holter or Event Cardiac Monitor; Future    3 months type 2 diabetes

## 2025-05-01 NOTE — ASSESSMENT & PLAN NOTE
Orders:    alendronate (Fosamax) 70 mg tablet; Take 1 tablet (70 mg) by mouth every 7 days. Take in the morning with a full glass of water, on an empty stomach, and do not take anything else by mouth or lie down for the next 30 min.

## 2025-05-01 NOTE — ASSESSMENT & PLAN NOTE
Stable  Orders:    sertraline (Zoloft) 100 mg tablet; Take 1 tablet (100 mg) by mouth once daily.

## 2025-05-01 NOTE — ASSESSMENT & PLAN NOTE
Due to the hypoglycemic symptomatic episodes, we will switch her glimepiride to glipizide extended release.  Discussed taking this with dinnertime.  Discussed with son and patient to continue monitoring blood glucose but to let me know if she has further readings of less than 70.  Will repeat hemoglobin A1c in 3 months.  Orders:    pioglitazone (Actos) 15 mg tablet; Take 1 tablet (15 mg) by mouth once daily.    dulaglutide (Trulicity) 0.75 mg/0.5 mL pen injector; Inject 0.75 mg under the skin 1 (one) time per week.    metFORMIN (Glucophage) 1,000 mg tablet; Take 1 tablet (1,000 mg) by mouth 2 times daily (morning and late afternoon).    glipiZIDE XL (Glucotrol XL) 5 mg 24 hr tablet; Take 1 tablet (5 mg) by mouth once daily. Do not crush, chew, or split.    Comprehensive Metabolic Panel; Future    Hemoglobin A1C; Future    CBC and Auto Differential; Future

## 2025-05-01 NOTE — ASSESSMENT & PLAN NOTE
Orders:    tiotropium-olodateroL (Stiolto Respimat) 2.5-2.5 mcg/actuation mist inhaler; Inhale 2 Inhalations once daily.

## 2025-05-05 ENCOUNTER — PATIENT OUTREACH (OUTPATIENT)
Dept: PRIMARY CARE | Facility: CLINIC | Age: 80
End: 2025-05-05
Payer: MEDICARE

## 2025-05-05 NOTE — PROGRESS NOTES
Confirmation of at least 2 patient identifiers.    Completed telephonic follow-up with patient after recent visit with Office Visit with Tasha Calhoun MD (04/30/2025)   Spoke to patient during outreach call.    Patient reports feeling: Improved    Patient has questions or concerns about medications: No    Have all prescribed medications been filled? Yes    Patient has necessary resources to manage their care? Yes    Patient has questions or concerns? No    Next care management follow-up approximately within one month.  Care  information provided to patient.

## 2025-05-19 ENCOUNTER — APPOINTMENT (OUTPATIENT)
Dept: PHYSICAL THERAPY | Facility: HOSPITAL | Age: 80
End: 2025-05-19
Payer: MEDICARE

## 2025-05-20 ENCOUNTER — CLINICAL SUPPORT (OUTPATIENT)
Dept: PHYSICAL THERAPY | Facility: HOSPITAL | Age: 80
End: 2025-05-20
Payer: MEDICARE

## 2025-05-20 DIAGNOSIS — R26.81 UNSTEADINESS: Primary | ICD-10-CM

## 2025-05-20 DIAGNOSIS — H81.11 BENIGN PAROXYSMAL POSITIONAL VERTIGO OF RIGHT EAR: ICD-10-CM

## 2025-05-20 PROBLEM — H81.10 BENIGN PAROXYSMAL POSITIONAL VERTIGO: Status: ACTIVE | Noted: 2025-05-20

## 2025-05-20 PROCEDURE — 95992 CANALITH REPOSITIONING PROC: CPT | Mod: GP | Performed by: PHYSICAL THERAPIST

## 2025-05-20 PROCEDURE — 97161 PT EVAL LOW COMPLEX 20 MIN: CPT | Mod: GP | Performed by: PHYSICAL THERAPIST

## 2025-05-20 ASSESSMENT — PAIN SCALES - GENERAL: PAINLEVEL_OUTOF10: 8

## 2025-05-20 ASSESSMENT — ENCOUNTER SYMPTOMS
LOSS OF SENSATION IN FEET: 0
OCCASIONAL FEELINGS OF UNSTEADINESS: 1
DEPRESSION: 1

## 2025-05-20 ASSESSMENT — PAIN - FUNCTIONAL ASSESSMENT: PAIN_FUNCTIONAL_ASSESSMENT: 0-10

## 2025-05-20 ASSESSMENT — PAIN DESCRIPTION - DESCRIPTORS: DESCRIPTORS: SHARP

## 2025-05-20 NOTE — PROGRESS NOTES
Physical Therapy    Physical Therapy Evaluation and Treatment      Patient Name: Funmilayo Montenegro  MRN: [id number]  : 1945   Today's Date: 2025  Time Calculation  Start Time: 1100  Stop Time: 1200  Time Calculation (min): 60 min     PT Evaluation Time Entry  PT Evaluation (Low) Time Entry: 40     PT Modalities Time Entry  Canalith Repositioning Time Entry: 20      Visit # 1    Assessment: Pt presents with s/s consistent with right posterior canalithiasis which affects her mobility and function. Her son verbalizes concern that he has seen her activity level decrease significantly since the HHPT for her hip fx ended in February, possibly due to the dizziness. She may benefit from general strengthening and balance exercises for debility following resolution of BPPV symptoms.   PT Assessment Results: Impaired balance, Decreased mobility (vertigo)  Rehab Prognosis: Good    Plan:  Treatment/Interventions: Canalith repositioning, Education/ Instruction, Gait training, Neuromuscular re-education, Therapeutic exercises  PT Plan: Skilled PT  PT Frequency: 2 times per week (1-2x/wk)  Duration: 4-6 wks  Rehab Potential: Good  Plan of Care Agreement: Patient    Current Problem:   1. Unsteadiness        2. Benign paroxysmal positional vertigo of right ear  Referral to Physical Therapy          Subjective      Chief complaint: Pt's son is present, helps with Hx. Pt c/o dizziness that comes and goes for years, having some symptoms every day. Tx in the past without benefit. Describes the room is spinning/she is spinning that lasts for a few minutes up to hours. States she gets neck pain and parietal HA when she gets dizzy. Meclizine can help. Dizziness typically worse with supine to sit, sit to stand, rolling in bed. She feels the neck pain seems to precede the dizziness. Use of walker at all times since she fell and broke her hip 2024. Denies tinnitus, intermittent right ear pain. No falls since started with  walker.     Pain Better: motion    Pain Worse: rest    Imagin/22 MRI   No evidence of diffusion restriction abnormality to suggest a  recent infarct, within limits of motion.  2. Foci of hyperintense T2 signal in the right thalamus likely  represent sequela of remote lacunar infarcts.  3. Confluent areas of hyperintense FLAIR and T2 signal present in the  periventricular and subcortical white matter of bilateral cerebral  hemispheres likely represent sequela of microvascular disease.    Prior level of function: use of FWW, (-) drive, very little house work    Current limitations: bed mobility, transfers    Home setup: Lives with daughter and granddaughter, son nearby to help. 3 steps in to 1-floor home.    Work: NA    Patient's goal: get rid of dizziness    Precautions:  Precautions  STEADI Fall Risk Score (The score of 4 or more indicates an increased risk of falling): 10  Precautions Comment: Fall risk    Pain:  Pain Assessment  Pain Assessment: 0-10  0-10 (Numeric) Pain Score: 8  Pain Type: Chronic pain  Pain Location: Neck  Pain Radiating Towards: head, shoulders  Pain Descriptors: Sharp  Pain Frequency: Intermittent    Objective:  Objective   Gait: FWW with flexed trunk posture    Cervical AROM: -25% EXT, kristan SB and ROT    Smooth pursuits: abnormal  Saccades: slow, normal  Convergence: NT    Head shake (horizontal): slight c/o dizziness without nystagmus  Head shake (vertical): neg    Head thrust test: (+) R, (-) L  VOR cancel: NT    Resting nystagmus (blocked fixation): NT  Gaze-evoked nystagmus (blocked fixation): NT    Feet apart, eyes open: SWAY  Feet apart, eyes closed: LOB  Feet together, eyes open: LOB  Feet together, eyes closed: LOB  Compliant surface, eyes open: NT  Compliant surface, eyes closed: NT    Fukuda step test: NT    Mouna-Hallpike: (+) R with up-beating and rotational nystagmus lasting ~10 seconds; (-) L  Side lying test: NT  Roll test: (-) kristan    Outcome Measures:  Other  Measures  Dizziness Handicap Inventory: 84     Treatments:  EXERCISES Date 5/20/2025  Date  Date Date   VISIT# #1 # # #    REPS REPS REPS REPS          Right Epley x2             Nu-step              Parallel bars:       Side stepping       Marches       Step-ups       Elevated sit to stands       DF stretch                     Shuttle:       DLP       SLP - kristan       TR                     Bridges       Band hip ABD/ER       SLR - kristan                            HEP:          Goals:  Active       BPPV       Pt will deny positional vertigo for improved bed mobility.        Start:  05/20/25    Expected End:  08/18/25            Improve DHI score >=40 points to decrease risk of falling.         Start:  05/20/25    Expected End:  08/18/25            Independent HEP for continued gains after PT is complete.        Start:  05/20/25    Expected End:  08/18/25

## 2025-05-26 ENCOUNTER — OFFICE VISIT (OUTPATIENT)
Dept: URGENT CARE | Age: 80
End: 2025-05-26
Payer: MEDICARE

## 2025-05-26 ENCOUNTER — ANCILLARY PROCEDURE (OUTPATIENT)
Dept: URGENT CARE | Age: 80
End: 2025-05-26
Payer: MEDICARE

## 2025-05-26 VITALS
HEART RATE: 68 BPM | SYSTOLIC BLOOD PRESSURE: 161 MMHG | RESPIRATION RATE: 18 BRPM | OXYGEN SATURATION: 96 % | DIASTOLIC BLOOD PRESSURE: 81 MMHG

## 2025-05-26 DIAGNOSIS — M79.671 RIGHT FOOT PAIN: ICD-10-CM

## 2025-05-26 DIAGNOSIS — S90.31XA CONTUSION OF RIGHT FOOT, INITIAL ENCOUNTER: ICD-10-CM

## 2025-05-26 DIAGNOSIS — S92.301A CLOSED FRACTURE OF HEAD OF METATARSAL BONE OF RIGHT FOOT, INITIAL ENCOUNTER: Primary | ICD-10-CM

## 2025-05-26 PROCEDURE — 3077F SYST BP >= 140 MM HG: CPT

## 2025-05-26 PROCEDURE — 3079F DIAST BP 80-89 MM HG: CPT

## 2025-05-26 PROCEDURE — 99213 OFFICE O/P EST LOW 20 MIN: CPT

## 2025-05-26 PROCEDURE — 73630 X-RAY EXAM OF FOOT: CPT | Mod: RIGHT SIDE

## 2025-05-26 ASSESSMENT — ENCOUNTER SYMPTOMS
WHEEZING: 0
ARTHRALGIAS: 1
FEVER: 0
JOINT SWELLING: 1
CHILLS: 0
NUMBNESS: 0
NAUSEA: 0
SHORTNESS OF BREATH: 0
DIARRHEA: 0
COLOR CHANGE: 1
WOUND: 0
VOMITING: 0

## 2025-05-26 NOTE — PATIENT INSTRUCTIONS
Rest, Ice Elevate when you can  Elevation will help significantly with swelling. Make sure you are holding foot up above heart level, will help to put some pillows under for elevation.     Monitor for any worsening symptoms, including numbness, tingling, limitation to movement.     If no improvement, follow up with pcp    Xray reveals fracture of 3rd and 4th metatarsals, that may be consistent with previous injury vs this injury, age not determined. At this time recommendation is to follow up with ortho.     No splinting at this time, as it will increase fall risk, continue ambulation with walking and keep off foot as much as you can until ortho follow up.

## 2025-05-26 NOTE — PROGRESS NOTES
Subjective   Patient ID: Funmilayo Montenegro is a 80 y.o. female. They present today with a chief complaint of Foot Injury (Pt advised that she trip and fell 3 days ago and injured her right foot & toes. Pt foot has swelling, Ecchymosis, and pain with ambulance. Pt does take 81 mg aspirin daily. Pt denied any head injury or loss of consciousness. ).    History of Present Illness  Patient presents for mechanical injury sustained 3 days ago, she believes she tripped on a rug or her walker. And injured the top of her right foot and toes. She denies any head injury or fall to the ground. Denies loc or any symptoms prior to injury. She is only an ASA as a blood thinner. Claims bruising and swelling. Denies open wound. Claims pain with walking/ambulation and to touch. Denies fever, chills, sweats. Denies n/v/d. Denies chest pain, sob. Upon further questioning patient did have a fall a few months ago, with pelvis fracture, unsure of fracture to foot at that time.         Foot Injury   Pertinent negatives include no numbness.       Past Medical History  Allergies as of 05/26/2025 - Reviewed 05/26/2025   Allergen Reaction Noted    Omeprazole Hives 08/01/2023    Ace inhibitors Hives 08/01/2023    Bupropion Hives 08/01/2023    Bupropion hcl Hives 08/01/2023    Codeine Other 08/01/2023    Penicillins Other 08/01/2023    Sulfamethoxazole-trimethoprim Hives 08/01/2023       Prescriptions Prior to Admission[1]     Medical History[2]    Surgical History[3]     reports that she quit smoking about 16 months ago. Her smoking use included cigarettes. She started smoking about 61 years ago. She has a 45 pack-year smoking history. She has never used smokeless tobacco. She reports that she does not currently use alcohol. She reports that she does not currently use drugs.    Review of Systems  Review of Systems   Constitutional:  Negative for chills and fever.   Respiratory:  Negative for shortness of breath and wheezing.     Cardiovascular:  Negative for chest pain.   Gastrointestinal:  Negative for diarrhea, nausea and vomiting.   Musculoskeletal:  Positive for arthralgias and joint swelling.   Skin:  Positive for color change. Negative for rash and wound.   Neurological:  Negative for numbness.                                  Objective    Vitals:    05/26/25 1407   BP: 161/81   Pulse: 68   Resp: 18   SpO2: 96%     No LMP recorded.    Physical Exam  Constitutional:       General: She is not in acute distress.     Appearance: Normal appearance. She is not ill-appearing.   HENT:      Head: Normocephalic and atraumatic.   Eyes:      Extraocular Movements: Extraocular movements intact.      Pupils: Pupils are equal, round, and reactive to light.   Pulmonary:      Effort: Pulmonary effort is normal.   Musculoskeletal:      Cervical back: Normal range of motion.      Right ankle: Normal.      Right foot: Normal range of motion and normal capillary refill. Swelling and tenderness present. Normal pulse.      Comments: Tenderness to right 3rd and 4th toes. Tenderness over ecchymosis of dorsal aspect of distal right foot.    Skin:     General: Skin is warm.      Capillary Refill: Capillary refill takes less than 2 seconds.      Findings: Bruising and ecchymosis present. No laceration or wound.      Comments: Ecchymosis to Dorsal aspect of distal right foot. Ecchymosis to right 3rd and 4th digits.    Neurological:      Mental Status: She is alert.         Procedures    Point of Care Test & Imaging Results from this visit  No results found for this visit on 05/26/25.   Imaging  XR foot right 3+ views  Result Date: 5/26/2025  Deformity involving the head of the 3rd and 4th metatarsals suggestive of fracture of uncertain age. Correlation with point tenderness recommended. Further evaluation with CT scan or MRI can be performed for better assessment if clinically warranted.   There is soft tissue swelling adjacent to the base of the 5th metatarsal  also noted but no displaced fracture is seen within limitation of this exam.     MACRO: None   Signed by: Kristin Bolden 5/26/2025 3:01 PM Dictation workstation:   [id number]      Cardiology, Vascular, and Other Imaging  No other imaging results found for the past 2 days      Diagnostic study results (if any) were reviewed by Felicity Iniguez PA-C.    Assessment/Plan   Allergies, medications, history, and pertinent labs/EKGs/Imaging reviewed by Felicity Iniguez PA-C.     Medical Decision Making  MDM- History, examination, and XRAY consistent with fracture of 3rd and 4th metatarsal, age uncertain. Neurovascular status is intact. Fracture does not need to be reduced at this time.  Patient will not be splinted at this time, given her age and difficulty in ambulation, I feel that splinting will be a greater fall risk for patient at her age. Fracture is stable, Ortho referral provided. Advised to limit ambulation and to rest until follow up with ortho. Patient advised to present in ED if symptoms change or worsen.  Patient and family member verbalized understanding and agrees with plan.       Orders and Diagnoses  Diagnoses and all orders for this visit:  Closed fracture of head of metatarsal bone of right foot, initial encounter  -     Referral to Orthopedics and Sports Medicine; Future  Right foot pain  -     XR foot right 3+ views; Future  Contusion of right foot, initial encounter      Medical Admin Record      Patient disposition: Home    Electronically signed by Felicity Iniguez PA-C  3:09 PM           [1] (Not in a hospital admission)   [2]   Past Medical History:  Diagnosis Date    COPD (chronic obstructive pulmonary disease) (Multi)     Depression     DM2 (diabetes mellitus, type 2) (Multi)     HTN (hypertension)     Osteoporosis    [3] No past surgical history on file.

## 2025-05-27 ENCOUNTER — APPOINTMENT (OUTPATIENT)
Dept: PRIMARY CARE | Facility: CLINIC | Age: 80
End: 2025-05-27
Payer: MEDICARE

## 2025-05-28 ENCOUNTER — PATIENT OUTREACH (OUTPATIENT)
Dept: PRIMARY CARE | Facility: CLINIC | Age: 80
End: 2025-05-28
Payer: MEDICARE

## 2025-06-03 ENCOUNTER — APPOINTMENT (OUTPATIENT)
Dept: ORTHOPEDIC SURGERY | Facility: HOSPITAL | Age: 80
End: 2025-06-03
Payer: MEDICARE

## 2025-06-12 ENCOUNTER — TELEPHONE (OUTPATIENT)
Dept: PRIMARY CARE | Facility: CLINIC | Age: 80
End: 2025-06-12
Payer: MEDICARE

## 2025-06-12 ENCOUNTER — TELEPHONE (OUTPATIENT)
Dept: RADIOLOGY | Facility: HOSPITAL | Age: 80
End: 2025-06-12
Payer: MEDICARE

## 2025-06-12 DIAGNOSIS — J42 CHRONIC BRONCHITIS, UNSPECIFIED CHRONIC BRONCHITIS TYPE (MULTI): ICD-10-CM

## 2025-06-12 PROCEDURE — RXMED WILLOW AMBULATORY MEDICATION CHARGE

## 2025-06-12 RX ORDER — TIOTROPIUM BROMIDE AND OLODATEROL 3.124; 2.736 UG/1; UG/1
2 SPRAY, METERED RESPIRATORY (INHALATION) DAILY
Qty: 12 G | Refills: 3 | Status: SHIPPED | OUTPATIENT
Start: 2025-06-12

## 2025-06-12 NOTE — TELEPHONE ENCOUNTER
----- Message from Rani Araujo sent at 6/12/2025 10:46 AM EDT -----  Regarding: Lung Cancer Screening 6 month fu pended order- so-sign request  Good morning,   Funmilayo Montenegro has an upcoming follow up appointment on 7-.  She is currently enrolled in our Lung Screening program and will be due for her recommended 6-month fu Chest CT 6-.       I placed the correct follow order for lung cancer screening in your in basket if you can sign it at your convenience, it will help to ensure timely follow up.   LR3  FU: R91.8 / CT Lung Screening FU CT Chest wo IV Contrast  (Img 201)    If you have previously placed another order please be sure to cancel to eliminate any confusion at the time of scheduling.     Thank you,   Rani Araujo   [facility] Lung Cancer Screening Team    [phone]

## 2025-06-13 ENCOUNTER — PHARMACY VISIT (OUTPATIENT)
Dept: PHARMACY | Facility: CLINIC | Age: 80
End: 2025-06-13
Payer: COMMERCIAL

## 2025-06-13 DIAGNOSIS — E11.9 TYPE 2 DIABETES MELLITUS WITHOUT COMPLICATION, WITHOUT LONG-TERM CURRENT USE OF INSULIN: ICD-10-CM

## 2025-06-13 RX ORDER — CALCIUM CITRATE/VITAMIN D3 200MG-6.25
TABLET ORAL
Qty: 100 STRIP | Refills: 3 | Status: SHIPPED | OUTPATIENT
Start: 2025-06-13

## 2025-06-19 ENCOUNTER — APPOINTMENT (OUTPATIENT)
Dept: PODIATRY | Facility: CLINIC | Age: 80
End: 2025-06-19
Payer: MEDICARE

## 2025-06-19 DIAGNOSIS — M79.675 TOE PAIN, LEFT: ICD-10-CM

## 2025-06-19 DIAGNOSIS — E11.40 TYPE 2 DIABETES MELLITUS WITH DIABETIC NEUROPATHY, WITHOUT LONG-TERM CURRENT USE OF INSULIN: ICD-10-CM

## 2025-06-19 DIAGNOSIS — Z79.84 LONG TERM CURRENT USE OF ORAL HYPOGLYCEMIC DRUG: ICD-10-CM

## 2025-06-19 DIAGNOSIS — B35.1 TINEA UNGUIUM: Primary | ICD-10-CM

## 2025-06-19 DIAGNOSIS — M79.674 TOE PAIN, RIGHT: ICD-10-CM

## 2025-06-19 PROCEDURE — 11721 DEBRIDE NAIL 6 OR MORE: CPT | Performed by: PODIATRIST

## 2025-06-19 PROCEDURE — 1159F MED LIST DOCD IN RCRD: CPT | Performed by: PODIATRIST

## 2025-06-19 PROCEDURE — 99202 OFFICE O/P NEW SF 15 MIN: CPT | Performed by: PODIATRIST

## 2025-06-19 PROCEDURE — RXMED WILLOW AMBULATORY MEDICATION CHARGE

## 2025-06-19 PROCEDURE — 1036F TOBACCO NON-USER: CPT | Performed by: PODIATRIST

## 2025-06-19 NOTE — PROGRESS NOTES
CC:  painful thickened and elongated toenails and diabetic care.     HPI: Pt presents for dm foot exam and painful thickened and elongated toenails that are difficult to manage.  Onset was gradual with worsening course until recently.  Aggravated by shoe gear and ambulation.   AIC 6.8, had previous foot injury doing ok, seen with daughter.    PCP: Dr. Calhoun  Last visit: 4-30-25     PMH  Medical History[1]  MEDS  Current Medications[2]  Allergies  Allergies[3]  Social History[4]  Family History[5]  Surgical History[6]    REVIEW OF SYSTEMS  DERM:   + as noted in HPI.       Physical examination:   On General Observation: Patient is a pleasant, cooperative, well developed 80 y.o. diabetic   adult female. The patient is alert and oriented to time, place and person. Patient has normal affect and mood.  There were no vitals taken for this visit.    Vascular:  DP and PT pulses are 2/4 b/l.  Mild dorsal right foot edema noted. mild varicosities b/l.  CFT  6 seconds to all digits bilateral.  Skin temperature is warm to warm from proximal to distal bilateral.      Muscular: Strength is 5/5 for all instrinsic and extrinsic muscle groups.     Neuro:  Proprioception present.   Sensation to vibration is  present. Protective sensation decreased  at all pedal sites via [place] 5.07 monofilament bilateral.  Light touch present bilateral.     Derm:    Left toenails: 1-5 Brittleness, crumbling upon debridement, subungual debris, elongation, mycotic appearance, tenderness, and thickness.   Right toenails: 1-5 Brittleness, crumbling upon debridement, subungual debris, elongation, mycotic appearance, tenderness, and thickness.   Hair growth is decreased b/l le    Ortho:  Slight edema is present right dorsal foot, rom is decreased 1st mpj,. Mtj, stj.    ASSESSMENT:    Tinea Unguium [B35.1]   E11.42  Pain in right toe(s) [M79.674]   Pain in left toe(s) [M79.675]       PLAN:   Consult  A comprehensive history and physical  examination were preformed. DM foot care and DM foot manifestations were reviewed.  The patient was educated on clinical findings, diagnosis and treatment plans. Patient understands all that has been explained and all questions were answered to apparent satisfaction.     - Debrided toenails 1-10 in length and height.   - Follow up in 9-12 weeks.       Johann Robledo DPM           [1]   Past Medical History:  Diagnosis Date    COPD (chronic obstructive pulmonary disease) (Multi)     Depression     DM2 (diabetes mellitus, type 2) (Multi)     HTN (hypertension)     Osteoporosis    [2]   Current Outpatient Medications:     albuterol (Ventolin HFA) 90 mcg/actuation inhaler, Inhale 2 puffs every 4 hours if needed for wheezing or shortness of breath., Disp: 8 g, Rfl: 0    alendronate (Fosamax) 70 mg tablet, Take 1 tablet (70 mg) by mouth every 7 days. Take in the morning with a full glass of water, on an empty stomach, and do not take anything else by mouth or lie down for the next 30 min., Disp: 12 tablet, Rfl: 1    amLODIPine (Norvasc) 10 mg tablet, Take 1 tablet (10 mg) by mouth once daily., Disp: 90 tablet, Rfl: 1    aspirin 81 mg EC tablet, Take 1 tablet (81 mg) by mouth once daily., Disp: , Rfl:     atorvastatin (Lipitor) 40 mg tablet, Take 1 tablet (40 mg) by mouth once daily., Disp: 90 tablet, Rfl: 1    blood sugar diagnostic (True Metrix Glucose Test Strip), Check glucose daily as directed, Disp: 100 strip, Rfl: 3    carvedilol (Coreg) 6.25 mg tablet, Take 1 tablet (6.25 mg) by mouth 2 times daily (morning and late afternoon)., Disp: 180 tablet, Rfl: 1    dulaglutide (Trulicity) 0.75 mg/0.5 mL pen injector, Inject 0.75 mg under the skin 1 (one) time per week., Disp: 6 mL, Rfl: 1    glipiZIDE XL (Glucotrol XL) 5 mg 24 hr tablet, Take 1 tablet (5 mg) by mouth once daily. Do not crush, chew, or split., Disp: 90 tablet, Rfl: 1    isosorbide mononitrate ER (Imdur) 60 mg 24 hr tablet, Take 1 tablet (60 mg) by mouth  "once daily., Disp: 90 tablet, Rfl: 1    loratadine (Claritin) 10 mg tablet, Take 1 tablet (10 mg) by mouth once daily., Disp: 90 tablet, Rfl: 1    meclizine (Antivert) 25 mg tablet, Take 1 tablet (25 mg) by mouth 3 times a day as needed for dizziness (/vertigo)., Disp: 30 tablet, Rfl: 0    metFORMIN (Glucophage) 1,000 mg tablet, Take 1 tablet (1,000 mg) by mouth 2 times daily (morning and late afternoon)., Disp: 180 tablet, Rfl: 1    pen needle, diabetic (NovoTwist) 32 gauge x 1/5\" needle, Use daily as directed with Humalog per sliding scale, Disp: 100 each, Rfl: 3    pioglitazone (Actos) 15 mg tablet, Take 1 tablet (15 mg) by mouth once daily., Disp: 90 tablet, Rfl: 1    polyethylene glycol (Glycolax, Miralax) 17 gram packet, Take 17 g by mouth once daily., Disp: , Rfl:     sertraline (Zoloft) 100 mg tablet, Take 1 tablet (100 mg) by mouth once daily., Disp: 90 tablet, Rfl: 1    tiotropium-olodateroL (Stiolto Respimat) 2.5-2.5 mcg/actuation mist inhaler, Inhale 2 Inhalations by mouth into the lungs once daily., Disp: 12 g, Rfl: 3    Ventolin HFA 90 mcg/actuation inhaler, Inhale 2 puffs every 4 hours if needed., Disp: , Rfl:   [3]   Allergies  Allergen Reactions    Omeprazole Hives     hives    Ace Inhibitors Hives    Bupropion Hives    Bupropion Hcl Hives     hives    Codeine Other    Penicillins Other    Sulfamethoxazole-Trimethoprim Hives   [4]   Social History  Socioeconomic History    Marital status:    Tobacco Use    Smoking status: Former     Current packs/day: 0.00     Average packs/day: 0.8 packs/day for 60.0 years (45.0 ttl pk-yrs)     Types: Cigarettes     Start date:      Quit date:      Years since quittin.4    Smokeless tobacco: Never   Vaping Use    Vaping status: Never Used   Substance and Sexual Activity    Alcohol use: Not Currently    Drug use: Not Currently     Social Drivers of Health     Financial Resource Strain: Low Risk  (2025)    Overall Financial Resource Strain " (CARDIA)     Difficulty of Paying Living Expenses: Not very hard   Food Insecurity: No Food Insecurity (4/22/2025)    Hunger Vital Sign     Worried About Running Out of Food in the Last Year: Never true     Ran Out of Food in the Last Year: Never true   Transportation Needs: No Transportation Needs (4/22/2025)    PRAPARE - Transportation     Lack of Transportation (Medical): No     Lack of Transportation (Non-Medical): No   Intimate Partner Violence: Not At Risk (4/22/2025)    Humiliation, Afraid, Rape, and Kick questionnaire     Fear of Current or Ex-Partner: No     Emotionally Abused: No     Physically Abused: No     Sexually Abused: No   Housing Stability: Low Risk  (4/22/2025)    Housing Stability Vital Sign     Unable to Pay for Housing in the Last Year: No     Number of Times Moved in the Last Year: 0     Homeless in the Last Year: No   [5] No family history on file.  [6] No past surgical history on file.

## 2025-06-21 ENCOUNTER — PHARMACY VISIT (OUTPATIENT)
Dept: PHARMACY | Facility: CLINIC | Age: 80
End: 2025-06-21
Payer: COMMERCIAL

## 2025-06-23 ENCOUNTER — TREATMENT (OUTPATIENT)
Dept: PHYSICAL THERAPY | Facility: HOSPITAL | Age: 80
End: 2025-06-23
Payer: MEDICARE

## 2025-06-23 DIAGNOSIS — R26.81 UNSTEADINESS: ICD-10-CM

## 2025-06-23 DIAGNOSIS — H81.11 BENIGN PAROXYSMAL POSITIONAL VERTIGO OF RIGHT EAR: ICD-10-CM

## 2025-06-23 PROCEDURE — 95992 CANALITH REPOSITIONING PROC: CPT | Mod: GP | Performed by: PHYSICAL THERAPIST

## 2025-06-23 ASSESSMENT — PAIN - FUNCTIONAL ASSESSMENT: PAIN_FUNCTIONAL_ASSESSMENT: 0-10

## 2025-06-23 ASSESSMENT — PAIN SCALES - GENERAL: PAINLEVEL_OUTOF10: 4

## 2025-06-23 NOTE — PROGRESS NOTES
Physical Therapy    Physical Therapy Treatment    Patient Name: Funmilayo Montenegro  MRN: [id number]  : 1945   Today's Date: 2025  Time Calculation  Start Time: 1115  Stop Time: 1155  Time Calculation (min): 40 min       PT Therapeutic Procedures Time Entry  Canalith Repositioning Time Entry: 40          Visit #2    Assessment:   Pt test positive again for likely right posterior canalithiasis and tolerates being taken througn a right Epley maneuver three times. She reports feeling OK leaving PT.  Pt reports 0/10 pain after treatment.    Plan:   Pt to call PT if she is still experiencing vertigo in the next few days. She may benefit from mastoid vibration if she continue to experience BPPV.    Current Problem  1. Benign paroxysmal positional vertigo of right ear  Follow Up In Physical Therapy      2. Unsteadiness  Follow Up In Physical Therapy          Subjective   General  Pt states she had no dizzy spells since she was last in PT about a month ago until vertigo returned about a week ago. Symptoms again with supine to sit, sit to stand, and rolling in bed. C/o axial neck pain when she gets dizzy. No falls.     Precautions  Precautions  STEADI Fall Risk Score (The score of 4 or more indicates an increased risk of falling): 10  Precautions Comment: Fall risk    Pain  Pain Assessment: 0-10  0-10 (Numeric) Pain Score: 4  Pain Type: Chronic pain  Pain Location: Neck    Objective     Mouna-Hallpike: (+) R with up-beating and rotational nystagmus lasting ~10 seconds; (-) L  Side lying test: NT  Roll test: (+) kristan for up-beating nystagmus lasting ~20'      Treatments:     EXERCISES Date 2025  Date 2025  Date Date   VISIT# #1 #2 # #    REPS REPS REPS REPS          Right Epley x2 x3            Nu-step              Parallel bars:       Side stepping       Marches       Step-ups       Elevated sit to stands       DF stretch                     Shuttle:       DLP       SLP - kristan       TR                      Bridges       Band hip ABD/ER       SLR - kristan                            HEP:  Home-going intructions                             Goals:  Active       BPPV       Pt will deny positional vertigo for improved bed mobility.        Start:  05/20/25    Expected End:  08/18/25            Improve DHI score >=40 points to decrease risk of falling.         Start:  05/20/25    Expected End:  08/18/25            Independent HEP for continued gains after PT is complete.        Start:  05/20/25    Expected End:  08/18/25

## 2025-06-26 NOTE — PROGRESS NOTES
Pharmacist Clinic: Diabetes Management  Funmilayo Montenegro is a 80 y.o. female was referred to Clinical Pharmacy Team for diabetes management.   Referring Provider: Tasha Calhoun MD  - Last visit with referring provider: 25     Patient Assistance for Trulicity and Stiolto approved through 25. Will have to be renewed prior to that date to prevent lapse in coverage. Medication(s) will be received at no cost to patient from [facility].       Subjective     HPI    Current Diabetes Pharmacotherapy:    - Glipizide XL 5 mg once daily   - was switched from glimepiride by PCP at last visit  - Trulicity 0.75 mg weekly -   - Metformin 1000 mg BID  - Pioglitazone 15 mg daily    Social History:  Current diet:   - Breakfast  - Lunch: grabs something on the go  - Dinner: Sometimes eats 2-3 bites and gets full   - Dinner: Chicken, roast beef, pork, veggies  - Drinks a lot of coffee   - Sugar free foods/drinks   - Cookies at night    Current exercise:   - None  - Has cane/walker due to back issues    Eye exam for this year?: No  Foot exam for this year?:  No    Current monitoring regimen:   Patient is using: glucometer  Type of CGM: N/A    Patient is testing blood sugars 2 times a day.    Reported blood sugars:   Fastin - 110    At night before meal: never more than 150    Any episodes of hypoglycemia? No  -  has hx of vertigo    Adverse Effects:   - None    Objective     There were no vitals taken for this visit.    Allergies   Allergen Reactions    Omeprazole Hives     hives    Ace Inhibitors Hives    Bupropion Hives    Bupropion Hcl Hives     hives    Codeine Other    Penicillins Other    Sulfamethoxazole-Trimethoprim Hives       Historical Diabetes Pharmacotherapy:  - Rybelsus (in replacement of Victoza)    SECONDARY PREVENTION  - Statin? Yes   - ACE-I/ARB? No  - Aspirin? Yes    Pertinent PMH Review:  - PMH of Pancreatitis: No  - PMH of Retinopathy: No  - PMH of Urinary Tract Infections:  No  - PMH of Yeast Infections: No  - PMH of MTC: No    Lab Review  Lab Results   Component Value Date    BILITOT 0.4 04/22/2025    CALCIUM 9.4 04/22/2025    CO2 30 04/22/2025    CL 99 04/22/2025    CREATININE 0.65 04/22/2025    GLUCOSE 236 (H) 04/22/2025    ALKPHOS 37 04/22/2025    K 4.4 04/22/2025    PROT 7.2 04/22/2025     04/22/2025    AST 26 04/22/2025    ALT 20 04/22/2025    BUN 16 04/22/2025    ANIONGAP 13 04/22/2025     07/14/2022    ALBUMIN 4.1 04/22/2025    GFRF >90 07/29/2023     Lab Results   Component Value Date    TRIG 149 04/22/2025    CHOL 140 04/22/2025    HDL 46.2 04/22/2025     Lab Results   Component Value Date    HGBA1C 6.8 (H) 04/23/2025    HGBA1C 8.4 (H) 07/27/2024    HGBA1C 9.0 (H) 02/24/2024     The ASCVD Risk score (Michael ESPINOZA, et al., 2019) failed to calculate for the following reasons:    The 2019 ASCVD risk score is only valid for ages 40 to 79    Drug Interactions:  - None    Affordability/Accessibility:  - [facility]      Preferred Pharmacy:  - [facility] ([place], [place])    Assessment/Plan   Problem List Items Addressed This Visit       Type 2 diabetes mellitus without complication, without long-term current use of insulin    Relevant Orders    Referral to Clinical Pharmacy     ASSESSMENT:  Patients diabetes is controlled with most recent A1c of 6.8% - 4/23/25 (Goal is < 8% due to age + other co morbidities) -     Spoke with patient's son today. PCP switched patient from glimepiride to glipizide. Son stated she has been doing better on it. No longer having lows. They has been testing before dinner, advised them to switch to 2hr pp to see if BG is coming down naturally after meals.     Discussed potentially starting to back off on some of oral medications since her A1c is now controlled to decreased pill burden for patient. Patient's son agrees and believe it would be great for her. We will stop pioglitazone at this time. Potentially can increase trulicity  if BG increased.     Patient's son will also be dropping off 2024 tax at PCP's office for renewal of her  PAP.     ADDENDUM: LVM for son to provide PCP's office of Imani's fax number [phone]    PLAN:  STOP Pioglitazone 15 mg daily  CONTINUE all DM medications  Follow up with clinical pharmacist: 7/25/25 @ 9:20   Follow up with PCP: 7/31/25    Thank you,  Jeannette Conroy, [facility] MED - PGY1 Resident     Continue all meds under the continuation of care with the referring provider and clinical pharmacy team.

## 2025-06-27 ENCOUNTER — APPOINTMENT (OUTPATIENT)
Dept: PHARMACY | Facility: HOSPITAL | Age: 80
End: 2025-06-27
Payer: MEDICARE

## 2025-06-27 DIAGNOSIS — E11.9 TYPE 2 DIABETES MELLITUS WITHOUT COMPLICATION, WITHOUT LONG-TERM CURRENT USE OF INSULIN: ICD-10-CM

## 2025-07-06 DIAGNOSIS — E11.9 TYPE 2 DIABETES MELLITUS WITHOUT COMPLICATION, WITHOUT LONG-TERM CURRENT USE OF INSULIN: ICD-10-CM

## 2025-07-06 DIAGNOSIS — E78.2 MIXED HYPERLIPIDEMIA: ICD-10-CM

## 2025-07-06 DIAGNOSIS — D72.829 LEUKOCYTOSIS, UNSPECIFIED TYPE: ICD-10-CM

## 2025-07-14 ENCOUNTER — APPOINTMENT (OUTPATIENT)
Dept: RADIOLOGY | Facility: CLINIC | Age: 80
End: 2025-07-14
Payer: MEDICARE

## 2025-07-14 ENCOUNTER — DOCUMENTATION (OUTPATIENT)
Dept: PHARMACY | Facility: HOSPITAL | Age: 80
End: 2025-07-14
Payer: MEDICARE

## 2025-07-14 NOTE — PROGRESS NOTES
Patient Assistance Program Application Status     Funmilayo Montenegro is a 80 y.o. female who was referred to the Clinical Pharmacy Team by No ref. provider found.    We are pleased to inform you that your application for assistance has been approved.    This approval is valid through 07/08/2026 as long as the following criteria continue to be satisfied:     Your medication (Stiolto) remains covered under your current insurance plan.   Your prescriber does not discontinue therapy.   You do not seek reimbursement from any other private or government-funded programs for the  medication.    Under this program, the pharmacy will first bill your insurance plan for your specified medication. The [employer] will then offset your copay balance, so that your out-of pocket expense for your medication will be $0.00.     Medication will be filled through [facility], and mailed to the patient. Contacted on the status of the approval. Provided the [facility] phone number ([phone]), and made aware that they will be hearing from someone at [facility] to set up delivery for the prescriptions.     Please reach out to the Clinical Pharmacy Team if there are any further questions.     Follow up with Clinical Pharmacy Team: 7/25/25 @ 9:20     Continue all meds under the continuation of care with the referring provider and clinical pharmacy team.    Verbal consent to manage patient's drug therapy was obtained from an individual authorized to act on behalf of a patient. They were informed they may decline to participate or withdraw from participation in pharmacy services at any time.

## 2025-07-15 ENCOUNTER — TELEPHONE (OUTPATIENT)
Dept: RADIOLOGY | Facility: HOSPITAL | Age: 80
End: 2025-07-15
Payer: MEDICARE

## 2025-07-23 NOTE — PROGRESS NOTES
Pharmacist Clinic: Diabetes Management  Funmilayo Montenegro is a 80 y.o. female was referred to Clinical Pharmacy Team for diabetes management.   Referring Provider: Tasha Calhoun MD  - Last visit with referring provider: 4/30/25  - Follow up with PCP: 7/31/25     Patient Assistance for Trulicity and Stiolto approved through 7/08/26. Will have to be renewed prior to that date to prevent lapse in coverage. Medication(s) will be received at no cost to patient from [facility].       Subjective     HPI  Appointment is done with her son Samy Montenegro    Current Diabetes Pharmacotherapy:    - Glipizide XL 5 mg once daily  - Trulicity 0.75 mg weekly - Mondays  - Metformin 1000 mg BID    Social History:  Current diet:   - Breakfast  - Lunch: grabs something on the go  - Dinner: Sometimes eats 2-3 bites and gets full   - Dinner: Chicken, roast beef, pork, veggies  - Drinks a lot of coffee   - Sugar free foods/drinks   - Cookies at night    Current exercise:   - None  - Has cane/walker due to back issues    Eye exam for this year?: No  Foot exam for this year?:  No    Current monitoring regimen:   Patient is using: glucometer  Type of CGM: N/A    Patient is testing blood sugars 2 times a day.    Reported blood sugars:   Fasting: < 100     At night before meal: 150 - 160    Any episodes of hypoglycemia? No  -  has hx of vertigo    Adverse Effects:   - None    Objective     There were no vitals taken for this visit.    Allergies   Allergen Reactions    Omeprazole Hives     hives    Ace Inhibitors Hives    Bupropion Hives    Bupropion Hcl Hives     hives    Codeine Other    Penicillins Other    Sulfamethoxazole-Trimethoprim Hives       Historical Diabetes Pharmacotherapy:  - Rybelsus (in replacement of Victoza)  - Pioglitazone (controlled BG)    SECONDARY PREVENTION  - Statin? Yes   - ACE-I/ARB? No  - Aspirin? Yes    Pertinent PMH Review:  - PMH of Pancreatitis: No  - PMH of Retinopathy: No  - PMH of Urinary  Tract Infections: No  - PMH of Yeast Infections: No  - PMH of MTC: No    Lab Review  Lab Results   Component Value Date    BILITOT 0.4 04/22/2025    CALCIUM 9.4 04/22/2025    CO2 30 04/22/2025    CL 99 04/22/2025    CREATININE 0.65 04/22/2025    GLUCOSE 236 (H) 04/22/2025    ALKPHOS 37 04/22/2025    K 4.4 04/22/2025    PROT 7.2 04/22/2025     04/22/2025    AST 26 04/22/2025    ALT 20 04/22/2025    BUN 16 04/22/2025    ANIONGAP 13 04/22/2025     07/14/2022    ALBUMIN 4.1 04/22/2025    GFRF >90 07/29/2023     Lab Results   Component Value Date    TRIG 149 04/22/2025    CHOL 140 04/22/2025    HDL 46.2 04/22/2025     Lab Results   Component Value Date    HGBA1C 6.8 (H) 04/23/2025    HGBA1C 8.4 (H) 07/27/2024    HGBA1C 9.0 (H) 02/24/2024     The ASCVD Risk score (Michael ESPINOZA, et al., 2019) failed to calculate for the following reasons:    The 2019 ASCVD risk score is only valid for ages 40 to 79    Drug Interactions:  - None    Affordability/Accessibility:  -  [facility]      Preferred Pharmacy:  - [facility] ([place], [place])    Assessment/Plan   Problem List Items Addressed This Visit       Type 2 diabetes mellitus without complication, without long-term current use of insulin - Primary    Relevant Orders    Referral to Clinical Pharmacy       ASSESSMENT:  Patients diabetes is controlled with most recent A1c of 6.8% - 4/23/25 (Goal is < 8% due to age + other co morbidities) -     Samy stated patient continue to do well without pioglitazone. She is happy with 1 less pill to take. BG continues to within goal. No hypoglycemic symptoms. Discussed potentially titrating metformin next to help meet their goal of decreasing pill burden for patient. Samy is agreeable to wait til patient's next PCP visit on 7/31 and after obtain her new labs to ensure her A1c is still controlled. Will set to follow up 4 weeks to discuss A1c result and potentially decreasing metformin use at next telehealth visit.      PLAN:  CONTINUE all DM medications  Follow up with clinical pharmacist: 8/22/25 @ 9:20     Thank you,  Jeannette Conroy [facility]  Clinical Pharmacy Specialist  P: [phone]   Email: [email or web address]     Continue all meds under the continuation of care with the referring provider and clinical pharmacy team.

## 2025-07-25 ENCOUNTER — APPOINTMENT (OUTPATIENT)
Dept: PHARMACY | Facility: HOSPITAL | Age: 80
End: 2025-07-25
Payer: MEDICARE

## 2025-07-25 DIAGNOSIS — E11.9 TYPE 2 DIABETES MELLITUS WITHOUT COMPLICATION, WITHOUT LONG-TERM CURRENT USE OF INSULIN: Primary | ICD-10-CM

## 2025-07-27 LAB
ALBUMIN SERPL-MCNC: 4.4 G/DL (ref 3.6–5.1)
ALP SERPL-CCNC: 33 U/L (ref 37–153)
ALT SERPL-CCNC: 19 U/L (ref 6–29)
ANION GAP SERPL CALCULATED.4IONS-SCNC: 8 MMOL/L (CALC) (ref 7–17)
AST SERPL-CCNC: 16 U/L (ref 10–35)
BASOPHILS # BLD AUTO: 51 CELLS/UL (ref 0–200)
BASOPHILS NFR BLD AUTO: 0.5 %
BILIRUB SERPL-MCNC: 0.6 MG/DL (ref 0.2–1.2)
BUN SERPL-MCNC: 16 MG/DL (ref 7–25)
CALCIUM SERPL-MCNC: 9.6 MG/DL (ref 8.6–10.4)
CHLORIDE SERPL-SCNC: 99 MMOL/L (ref 98–110)
CO2 SERPL-SCNC: 31 MMOL/L (ref 20–32)
CREAT SERPL-MCNC: 0.57 MG/DL (ref 0.6–0.95)
EGFRCR SERPLBLD CKD-EPI 2021: 92 ML/MIN/1.73M2
EOSINOPHIL # BLD AUTO: 173 CELLS/UL (ref 15–500)
EOSINOPHIL NFR BLD AUTO: 1.7 %
ERYTHROCYTE [DISTWIDTH] IN BLOOD BY AUTOMATED COUNT: 13.5 % (ref 11–15)
EST. AVERAGE GLUCOSE BLD GHB EST-MCNC: 157 MG/DL
EST. AVERAGE GLUCOSE BLD GHB EST-SCNC: 8.7 MMOL/L
GLUCOSE SERPL-MCNC: 125 MG/DL (ref 65–99)
HBA1C MFR BLD: 7.1 %
HCT VFR BLD AUTO: 41.4 % (ref 35–45)
HGB BLD-MCNC: 13.3 G/DL (ref 11.7–15.5)
LYMPHOCYTES # BLD AUTO: 1938 CELLS/UL (ref 850–3900)
LYMPHOCYTES NFR BLD AUTO: 19 %
MCH RBC QN AUTO: 30.2 PG (ref 27–33)
MCHC RBC AUTO-ENTMCNC: 32.1 G/DL (ref 32–36)
MCV RBC AUTO: 94.1 FL (ref 80–100)
MONOCYTES # BLD AUTO: 622 CELLS/UL (ref 200–950)
MONOCYTES NFR BLD AUTO: 6.1 %
NEUTROPHILS # BLD AUTO: 7415 CELLS/UL (ref 1500–7800)
NEUTROPHILS NFR BLD AUTO: 72.7 %
PLATELET # BLD AUTO: 297 THOUSAND/UL (ref 140–400)
PMV BLD REES-ECKER: 10.5 FL (ref 7.5–12.5)
POTASSIUM SERPL-SCNC: 4.2 MMOL/L (ref 3.5–5.3)
PROT SERPL-MCNC: 7.1 G/DL (ref 6.1–8.1)
RBC # BLD AUTO: 4.4 MILLION/UL (ref 3.8–5.1)
SODIUM SERPL-SCNC: 138 MMOL/L (ref 135–146)
WBC # BLD AUTO: 10.2 THOUSAND/UL (ref 3.8–10.8)

## 2025-07-28 ENCOUNTER — PATIENT OUTREACH (OUTPATIENT)
Dept: PRIMARY CARE | Facility: CLINIC | Age: 80
End: 2025-07-28
Payer: MEDICARE

## 2025-07-30 DIAGNOSIS — E11.9 TYPE 2 DIABETES MELLITUS WITHOUT COMPLICATION, WITHOUT LONG-TERM CURRENT USE OF INSULIN: ICD-10-CM

## 2025-07-31 ENCOUNTER — APPOINTMENT (OUTPATIENT)
Dept: PRIMARY CARE | Facility: CLINIC | Age: 80
End: 2025-07-31
Payer: MEDICARE

## 2025-07-31 VITALS
BODY MASS INDEX: 26.9 KG/M2 | SYSTOLIC BLOOD PRESSURE: 110 MMHG | OXYGEN SATURATION: 94 % | WEIGHT: 120 LBS | TEMPERATURE: 97.6 F | HEART RATE: 66 BPM | DIASTOLIC BLOOD PRESSURE: 52 MMHG

## 2025-07-31 DIAGNOSIS — R32 URINARY INCONTINENCE, UNSPECIFIED TYPE: ICD-10-CM

## 2025-07-31 DIAGNOSIS — R91.8 OTHER NONSPECIFIC ABNORMAL FINDING OF LUNG FIELD: ICD-10-CM

## 2025-07-31 DIAGNOSIS — J42 CHRONIC BRONCHITIS, UNSPECIFIED CHRONIC BRONCHITIS TYPE (MULTI): ICD-10-CM

## 2025-07-31 DIAGNOSIS — M81.0 AGE-RELATED OSTEOPOROSIS WITHOUT CURRENT PATHOLOGICAL FRACTURE: ICD-10-CM

## 2025-07-31 DIAGNOSIS — R10.84 GENERALIZED ABDOMINAL PAIN: ICD-10-CM

## 2025-07-31 DIAGNOSIS — I10 ESSENTIAL HYPERTENSION: ICD-10-CM

## 2025-07-31 DIAGNOSIS — M54.16 LUMBAR RADICULOPATHY: ICD-10-CM

## 2025-07-31 DIAGNOSIS — I25.10 CORONARY ARTERIOSCLEROSIS: ICD-10-CM

## 2025-07-31 DIAGNOSIS — F33.42 RECURRENT MAJOR DEPRESSIVE DISORDER, IN FULL REMISSION: ICD-10-CM

## 2025-07-31 DIAGNOSIS — H61.23 BILATERAL IMPACTED CERUMEN: ICD-10-CM

## 2025-07-31 DIAGNOSIS — E78.2 MIXED HYPERLIPIDEMIA: ICD-10-CM

## 2025-07-31 DIAGNOSIS — G25.81 RESTLESS LEGS: ICD-10-CM

## 2025-07-31 DIAGNOSIS — Z00.00 ROUTINE GENERAL MEDICAL EXAMINATION AT HEALTH CARE FACILITY: Primary | ICD-10-CM

## 2025-07-31 DIAGNOSIS — I10 PRIMARY HYPERTENSION: ICD-10-CM

## 2025-07-31 DIAGNOSIS — F32.2 MAJOR DEPRESSIVE DISORDER, SINGLE EPISODE, SEVERE WITHOUT PSYCHOTIC FEATURES (MULTI): ICD-10-CM

## 2025-07-31 DIAGNOSIS — Z87.2: ICD-10-CM

## 2025-07-31 DIAGNOSIS — E11.9 TYPE 2 DIABETES MELLITUS WITHOUT COMPLICATION, WITHOUT LONG-TERM CURRENT USE OF INSULIN: ICD-10-CM

## 2025-07-31 LAB
POC APPEARANCE, URINE: CLEAR
POC BILIRUBIN, URINE: NEGATIVE
POC BLOOD, URINE: NEGATIVE
POC COLOR, URINE: YELLOW
POC GLUCOSE, URINE: NEGATIVE MG/DL
POC KETONES, URINE: NEGATIVE MG/DL
POC LEUKOCYTES, URINE: ABNORMAL
POC NITRITE,URINE: POSITIVE
POC PH, URINE: 5.5 PH
POC PROTEIN, URINE: ABNORMAL MG/DL
POC SPECIFIC GRAVITY, URINE: 1.02
POC UROBILINOGEN, URINE: 0.2 EU/DL

## 2025-07-31 PROCEDURE — 3074F SYST BP LT 130 MM HG: CPT | Performed by: FAMILY MEDICINE

## 2025-07-31 PROCEDURE — 1160F RVW MEDS BY RX/DR IN RCRD: CPT | Performed by: FAMILY MEDICINE

## 2025-07-31 PROCEDURE — G0439 PPPS, SUBSEQ VISIT: HCPCS | Performed by: FAMILY MEDICINE

## 2025-07-31 PROCEDURE — 1170F FXNL STATUS ASSESSED: CPT | Performed by: FAMILY MEDICINE

## 2025-07-31 PROCEDURE — 69210 REMOVE IMPACTED EAR WAX UNI: CPT | Performed by: FAMILY MEDICINE

## 2025-07-31 PROCEDURE — 3078F DIAST BP <80 MM HG: CPT | Performed by: FAMILY MEDICINE

## 2025-07-31 PROCEDURE — 1157F ADVNC CARE PLAN IN RCRD: CPT | Performed by: FAMILY MEDICINE

## 2025-07-31 PROCEDURE — 99214 OFFICE O/P EST MOD 30 MIN: CPT | Performed by: FAMILY MEDICINE

## 2025-07-31 PROCEDURE — 81003 URINALYSIS AUTO W/O SCOPE: CPT | Performed by: FAMILY MEDICINE

## 2025-07-31 PROCEDURE — 99397 PER PM REEVAL EST PAT 65+ YR: CPT | Performed by: FAMILY MEDICINE

## 2025-07-31 PROCEDURE — 1159F MED LIST DOCD IN RCRD: CPT | Performed by: FAMILY MEDICINE

## 2025-07-31 RX ORDER — SERTRALINE HYDROCHLORIDE 100 MG/1
100 TABLET, FILM COATED ORAL DAILY
Qty: 90 TABLET | Refills: 1 | Status: SHIPPED | OUTPATIENT
Start: 2025-07-31 | End: 2026-07-31

## 2025-07-31 RX ORDER — ALBUTEROL SULFATE 90 UG/1
2 AEROSOL, METERED RESPIRATORY (INHALATION) EVERY 4 HOURS PRN
Qty: 18 G | Refills: 3 | Status: SHIPPED | OUTPATIENT
Start: 2025-07-31 | End: 2025-08-30

## 2025-07-31 RX ORDER — TIOTROPIUM BROMIDE AND OLODATEROL 3.124; 2.736 UG/1; UG/1
2 SPRAY, METERED RESPIRATORY (INHALATION) DAILY
Qty: 12 G | Refills: 3 | Status: SHIPPED | OUTPATIENT
Start: 2025-07-31

## 2025-07-31 RX ORDER — ALENDRONATE SODIUM 70 MG/1
70 TABLET ORAL
Qty: 12 TABLET | Refills: 1 | Status: SHIPPED | OUTPATIENT
Start: 2025-07-31 | End: 2026-01-27

## 2025-07-31 RX ORDER — ISOSORBIDE MONONITRATE 60 MG/1
60 TABLET, EXTENDED RELEASE ORAL DAILY
Qty: 90 TABLET | Refills: 1 | Status: SHIPPED | OUTPATIENT
Start: 2025-07-31 | End: 2026-07-31

## 2025-07-31 RX ORDER — CARVEDILOL 6.25 MG/1
6.25 TABLET ORAL
Qty: 180 TABLET | Refills: 1 | Status: SHIPPED | OUTPATIENT
Start: 2025-07-31

## 2025-07-31 RX ORDER — ATORVASTATIN CALCIUM 40 MG/1
40 TABLET, FILM COATED ORAL NIGHTLY
Qty: 90 TABLET | Refills: 1 | Status: SHIPPED | OUTPATIENT
Start: 2025-07-31 | End: 2026-07-31

## 2025-07-31 RX ORDER — METFORMIN HYDROCHLORIDE 1000 MG/1
1000 TABLET ORAL
Qty: 180 TABLET | Refills: 1 | Status: SHIPPED | OUTPATIENT
Start: 2025-07-31 | End: 2026-07-31

## 2025-07-31 RX ORDER — AMLODIPINE BESYLATE 10 MG/1
10 TABLET ORAL DAILY
Qty: 90 TABLET | Refills: 1 | Status: SHIPPED | OUTPATIENT
Start: 2025-07-31 | End: 2026-01-27

## 2025-07-31 RX ORDER — GLIPIZIDE 5 MG/1
5 TABLET, FILM COATED, EXTENDED RELEASE ORAL DAILY
Qty: 90 TABLET | Refills: 1 | Status: SHIPPED | OUTPATIENT
Start: 2025-07-31 | End: 2026-01-27

## 2025-07-31 ASSESSMENT — PATIENT HEALTH QUESTIONNAIRE - PHQ9
SUM OF ALL RESPONSES TO PHQ QUESTIONS 1-9: 7
5. POOR APPETITE OR OVEREATING: SEVERAL DAYS
SUM OF ALL RESPONSES TO PHQ9 QUESTIONS 1 AND 2: 2
6. FEELING BAD ABOUT YOURSELF - OR THAT YOU ARE A FAILURE OR HAVE LET YOURSELF OR YOUR FAMILY DOWN: SEVERAL DAYS
9. THOUGHTS THAT YOU WOULD BE BETTER OFF DEAD, OR OF HURTING YOURSELF: NOT AT ALL
4. FEELING TIRED OR HAVING LITTLE ENERGY: SEVERAL DAYS
3. TROUBLE FALLING OR STAYING ASLEEP OR SLEEPING TOO MUCH: MORE THAN HALF THE DAYS
1. LITTLE INTEREST OR PLEASURE IN DOING THINGS: SEVERAL DAYS
7. TROUBLE CONCENTRATING ON THINGS, SUCH AS READING THE NEWSPAPER OR WATCHING TELEVISION: NOT AT ALL
2. FEELING DOWN, DEPRESSED OR HOPELESS: SEVERAL DAYS
8. MOVING OR SPEAKING SO SLOWLY THAT OTHER PEOPLE COULD HAVE NOTICED. OR THE OPPOSITE, BEING SO FIGETY OR RESTLESS THAT YOU HAVE BEEN MOVING AROUND A LOT MORE THAN USUAL: NOT AT ALL

## 2025-07-31 ASSESSMENT — ENCOUNTER SYMPTOMS
LIGHT-HEADEDNESS: 0
FREQUENCY: 0
APPETITE CHANGE: 0
CONSTIPATION: 0
PALPITATIONS: 0
BLOOD IN STOOL: 0
FEVER: 0
HEMATURIA: 0
WEAKNESS: 0
HEADACHES: 0
DYSURIA: 0
DIARRHEA: 0
CHILLS: 0
ARTHRALGIAS: 0
HYPERTENSION: 1
TROUBLE SWALLOWING: 0
SLEEP DISTURBANCE: 0
ABDOMINAL PAIN: 0
SHORTNESS OF BREATH: 0
NAUSEA: 0
ACTIVITY CHANGE: 0
NUMBNESS: 0
DIZZINESS: 0
VOMITING: 0

## 2025-07-31 ASSESSMENT — ACTIVITIES OF DAILY LIVING (ADL)
TAKING_MEDICATION: NEEDS ASSISTANCE
DOING_HOUSEWORK: NEEDS ASSISTANCE
BATHING: INDEPENDENT
DRESSING: INDEPENDENT
MANAGING_FINANCES: NEEDS ASSISTANCE
GROCERY_SHOPPING: NEEDS ASSISTANCE

## 2025-07-31 NOTE — ASSESSMENT & PLAN NOTE
Stable   Orders:    tiotropium-olodateroL (Stiolto Respimat) 2.5-2.5 mcg/actuation mist inhaler; Inhale 2 Inhalations by mouth into the lungs once daily.    Ventolin HFA 90 mcg/actuation inhaler; Inhale 2 puffs every 4 hours if needed for wheezing.

## 2025-07-31 NOTE — ASSESSMENT & PLAN NOTE
Controlled. No changes to blood pressure meds. Recommended to check her bp at home once a week. Goal less than 130/80    Orders:    CBC and Auto Differential; Future    Comprehensive Metabolic Panel; Future

## 2025-07-31 NOTE — ASSESSMENT & PLAN NOTE
Tolerating alendronate well   Orders:    CBC and Auto Differential; Future    Comprehensive Metabolic Panel; Future    alendronate (Fosamax) 70 mg tablet; Take 1 tablet (70 mg) by mouth every 7 days. Take in the morning with a full glass of water, on an empty stomach, and do not take anything else by mouth or lie down for the next 30 min.

## 2025-07-31 NOTE — PROGRESS NOTES
Subjective   Reason for Visit: Funmilayo Montenegro is an 80 y.o. female here for a Medicare Wellness visit.     Past Medical, Surgical, and Family History reviewed and updated in chart.    Reviewed all medications by prescribing practitioner or clinical pharmacist (such as prescriptions, OTCs, herbal therapies and supplements) and documented in the medical record.    Diabetes  Pertinent negatives for hypoglycemia include no dizziness or headaches. Pertinent negatives for diabetes include no chest pain and no weakness.   Hypertension  Pertinent negatives include no chest pain, headaches, palpitations or shortness of breath.   Hyperlipidemia  Pertinent negatives include no chest pain or shortness of breath.   Coronary Artery Disease  Pertinent negatives include no chest pain, dizziness, leg swelling, palpitations or shortness of breath. Risk factors include hyperlipidemia.     Urinary incontinence   No hematuria     DM2  Taking glimepiride 4 mg bid, Metformin 1000 mg bid, Actos every day,  and Trulicity last visit.   Lab Results   Component Value Date    HGBA1C 7.1 (H) 07/26/2025      HTN/CAD:  Controlled.  Taking Carvedilol 6.25 mg BID, Imdur 60 mg, amlodipine 10 mg, ASA 81mg every day     Depression  Doing well with sertraline     Osteoporosis: Last DEXA 9/4/24.  Spine Tscore -2.5, Left femur -2.8  Tolerating alendronate well and no SE fr meds. Taking otc vit d + ca      Hasn't smoked since 10/4/24  Due for lung cancer screening     Office Visit on 07/31/2025   Component Date Value Ref Range Status    POC Color, Urine 07/31/2025 Yellow  Straw, Yellow, Light-Yellow Final    POC Appearance, Urine 07/31/2025 Clear  Clear Final    POC Glucose, Urine 07/31/2025 NEGATIVE  NEGATIVE mg/dl Final    POC Bilirubin, Urine 07/31/2025 NEGATIVE  NEGATIVE Final    POC Ketones, Urine 07/31/2025 NEGATIVE  NEGATIVE mg/dl Final    POC Specific Gravity, Urine 07/31/2025 1.020  1.005 - 1.035 Final    POC Blood, Urine 07/31/2025 NEGATIVE   NEGATIVE Final    POC PH, Urine 07/31/2025 5.5  No Reference Range Established PH Final    POC Protein, Urine 07/31/2025 30 (1+) (A)  NEGATIVE mg/dl Final    POC Urobilinogen, Urine 07/31/2025 0.2  0.2, 1.0 EU/DL Final    Poc Nitrite, Urine 07/31/2025 POSITIVE (A)  NEGATIVE Final    POC Leukocytes, Urine 07/31/2025 TRACE (A)  NEGATIVE Final   Orders Only on 07/30/2025   Component Date Value Ref Range Status    GLUCOSE 07/26/2025 125 (H)  65 - 99 mg/dL Final    Comment:               Fasting reference interval     For someone without known diabetes, a glucose value  between 100 and 125 mg/dL is consistent with  prediabetes and should be confirmed with a  follow-up test.         UREA NITROGEN (BUN) 07/26/2025 16  7 - 25 mg/dL Final    CREATININE 07/26/2025 0.57 (L)  0.60 - 0.95 mg/dL Final    EGFR 07/26/2025 92  > OR = 60 mL/min/1.73m2 Final    SODIUM 07/26/2025 138  135 - 146 mmol/L Final    POTASSIUM 07/26/2025 4.2  3.5 - 5.3 mmol/L Final    CHLORIDE 07/26/2025 99  98 - 110 mmol/L Final    CARBON DIOXIDE 07/26/2025 31  20 - 32 mmol/L Final    ELECTROLYTE BALANCE 07/26/2025 8  7 - 17 mmol/L (calc) Final    CALCIUM 07/26/2025 9.6  8.6 - 10.4 mg/dL Final    PROTEIN, TOTAL 07/26/2025 7.1  6.1 - 8.1 g/dL Final    ALBUMIN 07/26/2025 4.4  3.6 - 5.1 g/dL Final    BILIRUBIN, TOTAL 07/26/2025 0.6  0.2 - 1.2 mg/dL Final    ALKALINE PHOSPHATASE 07/26/2025 33 (L)  37 - 153 U/L Final    AST 07/26/2025 16  10 - 35 U/L Final    ALT 07/26/2025 19  6 - 29 U/L Final    HEMOGLOBIN A1c 07/26/2025 7.1 (H)  <5.7 % Final    Comment: For someone without known diabetes, a hemoglobin A1c  value of 6.5% or greater indicates that they may have   diabetes and this should be confirmed with a follow-up   test.     For someone with known diabetes, a value <7% indicates   that their diabetes is well controlled and a value   greater than or equal to 7% indicates suboptimal   control. A1c targets should be individualized based on   duration of  diabetes, age, comorbid conditions, and   other considerations.     Currently, no consensus exists regarding use of  hemoglobin A1c for diagnosis of diabetes for children.          eAG (mg/dL) 07/26/2025 157  mg/dL Final    eAG (mmol/L) 07/26/2025 8.7  mmol/L Final    WHITE BLOOD CELL COUNT 07/26/2025 10.2  3.8 - 10.8 Thousand/uL Final    RED BLOOD CELL COUNT 07/26/2025 4.40  3.80 - 5.10 Million/uL Final    HEMOGLOBIN 07/26/2025 13.3  11.7 - 15.5 g/dL Final    HEMATOCRIT 07/26/2025 41.4  35.0 - 45.0 % Final    MCV 07/26/2025 94.1  80.0 - 100.0 fL Final    MCH 07/26/2025 30.2  27.0 - 33.0 pg Final    MCHC 07/26/2025 32.1  32.0 - 36.0 g/dL Final    Comment: For adults, a slight decrease in the calculated MCHC  value (in the range of 30 to 32 g/dL) is most likely  not clinically significant; however, it should be  interpreted with caution in correlation with other  red cell parameters and the patient's clinical  condition.      RDW 07/26/2025 13.5  11.0 - 15.0 % Final    PLATELET COUNT 07/26/2025 297  140 - 400 Thousand/uL Final    MPV 07/26/2025 10.5  7.5 - 12.5 fL Final    ABSOLUTE NEUTROPHILS 07/26/2025 7,415  1,500 - 7,800 cells/uL Final    ABSOLUTE LYMPHOCYTES 07/26/2025 1,938  850 - 3,900 cells/uL Final    ABSOLUTE MONOCYTES 07/26/2025 622  200 - 950 cells/uL Final    ABSOLUTE EOSINOPHILS 07/26/2025 173  15 - 500 cells/uL Final    ABSOLUTE BASOPHILS 07/26/2025 51  0 - 200 cells/uL Final    NEUTROPHILS 07/26/2025 72.7  % Final    LYMPHOCYTES 07/26/2025 19.0  % Final    MONOCYTES 07/26/2025 6.1  % Final    EOSINOPHILS 07/26/2025 1.7  % Final    BASOPHILS 07/26/2025 0.5  % Final       Patient Care Team:  Tasha Calhoun MD as PCP - General (Family Medicine)  Ar Jimenez DO as PCP - [employer] PCP  Imani Borja, PharmD as Pharmacist (Pharmacy)     Patient ID: Funmilayo Montenegro is a 80 y.o. female.    Ear Cerumen Removal    Date/Time: 7/31/2025 9:31 PM    Performed by: Tasha Calhoun,  MD  Authorized by: Tasha Calhoun MD    Consent:     Consent obtained:  Verbal    Consent given by:  Patient    Risks, benefits, and alternatives were discussed: yes      Risks discussed:  Bleeding, TM perforation, dizziness and pain    Alternatives discussed:  No treatment  Procedure details:     Location:  L ear and R ear    Procedure type: curette      Procedure outcomes: cerumen removed    Post-procedure details:     Inspection:  TM intact    Hearing quality:  Improved    Procedure completion:  Tolerated well, no immediate complications    Review of Systems   Constitutional:  Negative for activity change, appetite change, chills and fever.   HENT:  Negative for hearing loss, tinnitus and trouble swallowing.    Eyes:  Negative for visual disturbance.   Respiratory:  Negative for shortness of breath.    Cardiovascular:  Negative for chest pain, palpitations and leg swelling.   Gastrointestinal:  Negative for abdominal pain, blood in stool, constipation, diarrhea, nausea and vomiting.   Genitourinary:  Negative for dysuria, frequency, hematuria and pelvic pain.   Musculoskeletal:  Negative for arthralgias.   Skin:  Negative for rash.   Neurological:  Negative for dizziness, weakness, light-headedness, numbness and headaches.   Psychiatric/Behavioral:  Negative for sleep disturbance.        Objective   Vitals:  /52   Pulse 66   Temp 36.4 °C (97.6 °F)   Wt 54.4 kg (120 lb)   SpO2 94%   BMI 26.90 kg/m²         4/23/2025     1:41 AM 4/23/2025     6:25 AM 4/23/2025     9:00 AM 4/23/2025     1:00 PM 4/30/2025    11:26 AM 5/26/2025     2:07 PM 7/31/2025    11:29 AM   Vitals   Systolic 160 143 163 174 126 161 110   Diastolic 82 89 75 76 58 81 52   BP Location Right arm Right arm Right arm Right arm      Heart Rate 77 71 71 69 67 68 66   Temp 36.1 °C (97 °F) 36 °C (96.8 °F) 36.5 °C (97.7 °F) 36.6 °C (97.9 °F) 36.4 °C (97.6 °F) -- 36.4 °C (97.6 °F)   Resp 16 16 18 18  18    Weight (lb)       120   BMI       26.9  kg/m2   BSA (m2)       1.47 m2   Visit Report     Report Report Report       Physical Exam  Constitutional:       Appearance: Normal appearance.   HENT:      Head: Normocephalic and atraumatic.      Right Ear: Tympanic membrane normal. There is impacted cerumen.      Left Ear: Tympanic membrane normal. There is impacted cerumen.      Nose: Nose normal.      Mouth/Throat:      Mouth: Mucous membranes are moist.     Eyes:      Pupils: Pupils are equal, round, and reactive to light.     Neck:      Thyroid: No thyromegaly.     Cardiovascular:      Rate and Rhythm: Normal rate and regular rhythm.   Pulmonary:      Effort: Pulmonary effort is normal.      Breath sounds: Normal breath sounds.   Abdominal:      General: Abdomen is flat. Bowel sounds are normal. There is no distension.      Palpations: Abdomen is soft.      Tenderness: There is no guarding or rebound.     Musculoskeletal:         General: No swelling or deformity. Normal range of motion.      Cervical back: Normal range of motion.     Skin:     General: Skin is warm.     Neurological:      General: No focal deficit present.      Mental Status: She is alert.     Psychiatric:         Mood and Affect: Mood normal.         Assessment & Plan  Routine general medical examination at health care facility  Recommendations for women annual wellness exam:  Make sure screenings for cervical, breast, and colon cancer are up to date if applicable- pap smears age 21-65, discuss mammogram starting at age 40, colonoscopy at age 45, earlier if positive family history for breast or colon cancer  Screen for osteoporosis with DXA bone scan starting at age 65 or sooner if risk factors present (long term steroid use, smoking, heavy alcohol use, history of fracture, rheumatoid arthritis, low body weight, family history of hip fracture)  Screening for lung cancer with low-dose CT in all adults age 55-77 who have a 30 pack-year smoking history and currently smoke or have quit within  the past 15 years  Follow a healthy diet (Dash diet, Mediterranean diet)  Exercise 150 min/wk  Maintain healthy weight (BMI < 25)  Do not smoke  Alcohol in moderation (up to 1 drink/day)  Get enough sleep (7-8 hours/night)  Make sure immunizations are up to date (influenza, pneumococcal, Tdap, shingles if age > 50)  Postmenopausal women or women with osteoporosis need minimum 1,200 mg calcium and 800 IU vitamin D daily  Talk to your physician if you have concerns about depression or anxiety  Visit dentist twice yearly    Coronary arteriosclerosis    Orders:    CBC and Auto Differential; Future    Comprehensive Metabolic Panel; Future    Primary hypertension  Controlled. No changes to blood pressure meds. Recommended to check her bp at home once a week. Goal less than 130/80    Orders:    CBC and Auto Differential; Future    Comprehensive Metabolic Panel; Future    Restless legs    Orders:    CBC and Auto Differential; Future    Comprehensive Metabolic Panel; Future    Age-related osteoporosis without current pathological fracture  Tolerating alendronate well   Orders:    CBC and Auto Differential; Future    Comprehensive Metabolic Panel; Future    alendronate (Fosamax) 70 mg tablet; Take 1 tablet (70 mg) by mouth every 7 days. Take in the morning with a full glass of water, on an empty stomach, and do not take anything else by mouth or lie down for the next 30 min.    Lumbar radiculopathy    Orders:    CBC and Auto Differential; Future    Comprehensive Metabolic Panel; Future    H/O idiopathic urticaria    Orders:    CBC and Auto Differential; Future    Comprehensive Metabolic Panel; Future    Type 2 diabetes mellitus without complication, without long-term current use of insulin  Improved. Cont meds   Orders:    CBC and Auto Differential; Future    Comprehensive Metabolic Panel; Future    Hemoglobin A1C; Future    glipiZIDE XL (Glucotrol XL) 5 mg 24 hr tablet; Take 1 tablet (5 mg) by mouth once daily. Do not crush,  chew, or split.    metFORMIN (Glucophage) 1,000 mg tablet; Take 1 tablet (1,000 mg) by mouth 2 times daily (morning and late afternoon).    Mixed hyperlipidemia    Orders:    Lipid Panel; Future    atorvastatin (Lipitor) 40 mg tablet; Take 1 tablet (40 mg) by mouth once daily at bedtime.    Essential hypertension  Controlled. No changes to blood pressure meds. Recommended to check her bp at home once a week. Goal less than 130/80    Orders:    amLODIPine (Norvasc) 10 mg tablet; Take 1 tablet (10 mg) by mouth once daily.    carvedilol (Coreg) 6.25 mg tablet; Take 1 tablet (6.25 mg) by mouth 2 times daily (morning and late afternoon).    isosorbide mononitrate ER (Imdur) 60 mg 24 hr tablet; Take 1 tablet (60 mg) by mouth once daily.    Recurrent major depressive disorder, in full remission    Orders:    sertraline (Zoloft) 100 mg tablet; Take 1 tablet (100 mg) by mouth once daily.    Chronic bronchitis, unspecified chronic bronchitis type (Multi)  Stable   Orders:    tiotropium-olodateroL (Stiolto Respimat) 2.5-2.5 mcg/actuation mist inhaler; Inhale 2 Inhalations by mouth into the lungs once daily.    Ventolin HFA 90 mcg/actuation inhaler; Inhale 2 puffs every 4 hours if needed for wheezing.    Other nonspecific abnormal finding of lung field    Orders:    CT lung screening follow up CT chest wo IV contrast; Future    Generalized abdominal pain    Orders:    CT abdomen pelvis w IV contrast; Future    Urinary incontinence, unspecified type    Orders:    POCT UA Automated manually resulted    Urine Culture; Future    Bilateral impacted cerumen  Hearing improved after procedure   Orders:    Ear Cerumen Removal    Major depressive disorder, single episode, severe without psychotic features (Multi)  Stable

## 2025-07-31 NOTE — ASSESSMENT & PLAN NOTE
Orders:    Lipid Panel; Future    atorvastatin (Lipitor) 40 mg tablet; Take 1 tablet (40 mg) by mouth once daily at bedtime.

## 2025-07-31 NOTE — ASSESSMENT & PLAN NOTE
Improved. Cont meds   Orders:    CBC and Auto Differential; Future    Comprehensive Metabolic Panel; Future    Hemoglobin A1C; Future    glipiZIDE XL (Glucotrol XL) 5 mg 24 hr tablet; Take 1 tablet (5 mg) by mouth once daily. Do not crush, chew, or split.    metFORMIN (Glucophage) 1,000 mg tablet; Take 1 tablet (1,000 mg) by mouth 2 times daily (morning and late afternoon).

## 2025-07-31 NOTE — PATIENT INSTRUCTIONS
Stop caffeine.  Start drinking decaf.  I think this is contributing to reading.  Dizziness as well as your urinary incontinence    Please obtain fasting blood work 1 week prior to seeing me in 6 months    When I examined you, I felt something hard around your abdominal area so I ordered a CAT scan of the abdomen for you.  They will contact you to schedule this    Please check your blood pressure at home.  At the office, it was slightly low but from what you reported for me from home, it has been normal.  Goal blood pressure for you is 130/80.  If your blood pressure continues to be in the 110s/50s, you need to let me know so that I can decrease your blood pressure medications since this also could contribute to your dizziness

## 2025-08-02 LAB — BACTERIA UR CULT: ABNORMAL

## 2025-08-04 ENCOUNTER — RESULTS FOLLOW-UP (OUTPATIENT)
Dept: PRIMARY CARE | Facility: CLINIC | Age: 80
End: 2025-08-04
Payer: MEDICARE

## 2025-08-04 DIAGNOSIS — N30.00 ACUTE CYSTITIS WITHOUT HEMATURIA: Primary | ICD-10-CM

## 2025-08-04 RX ORDER — NITROFURANTOIN 25; 75 MG/1; MG/1
100 CAPSULE ORAL 2 TIMES DAILY
Qty: 10 CAPSULE | Refills: 0 | Status: SHIPPED | OUTPATIENT
Start: 2025-08-04 | End: 2025-08-09

## 2025-08-04 NOTE — TELEPHONE ENCOUNTER
----- Message from Tasha Calhoun MD sent at 8/4/2025  3:03 PM EDT -----    Please inform patient that she that she has urinary tract infection.  I would like to treat I would like to treat her with antibiotic I would like to treat her with antibiotics.  I sent I sent a   medication to her local pharmacy [facility] in [place]  ----- Message -----  From: Celia Lei MA  Sent: 7/31/2025  12:44 PM EDT  To: Tasha Calhoun MD

## 2025-08-15 ENCOUNTER — HOSPITAL ENCOUNTER (OUTPATIENT)
Dept: RADIOLOGY | Facility: HOSPITAL | Age: 80
Discharge: HOME | End: 2025-08-15
Payer: MEDICARE

## 2025-08-15 DIAGNOSIS — R10.84 GENERALIZED ABDOMINAL PAIN: ICD-10-CM

## 2025-08-15 PROCEDURE — 2550000001 HC RX 255 CONTRASTS: Performed by: FAMILY MEDICINE

## 2025-08-15 PROCEDURE — 74177 CT ABD & PELVIS W/CONTRAST: CPT

## 2025-08-15 RX ADMIN — IOHEXOL 75 ML: 350 INJECTION, SOLUTION INTRAVENOUS at 17:22

## 2025-08-22 ENCOUNTER — APPOINTMENT (OUTPATIENT)
Dept: PHARMACY | Facility: HOSPITAL | Age: 80
End: 2025-08-22
Payer: MEDICARE

## 2025-08-22 DIAGNOSIS — E11.9 TYPE 2 DIABETES MELLITUS WITHOUT COMPLICATION, WITHOUT LONG-TERM CURRENT USE OF INSULIN: ICD-10-CM

## 2025-08-29 ENCOUNTER — TELEPHONE (OUTPATIENT)
Dept: PRIMARY CARE | Facility: CLINIC | Age: 80
End: 2025-08-29
Payer: MEDICARE

## 2025-09-19 ENCOUNTER — APPOINTMENT (OUTPATIENT)
Dept: PODIATRY | Facility: CLINIC | Age: 80
End: 2025-09-19
Payer: MEDICARE

## 2025-11-20 ENCOUNTER — APPOINTMENT (OUTPATIENT)
Dept: PHARMACY | Facility: HOSPITAL | Age: 80
End: 2025-11-20
Payer: MEDICARE

## 2026-02-02 ENCOUNTER — APPOINTMENT (OUTPATIENT)
Dept: PRIMARY CARE | Facility: CLINIC | Age: 81
End: 2026-02-02
Payer: MEDICARE